# Patient Record
Sex: MALE | Race: WHITE | HISPANIC OR LATINO
[De-identification: names, ages, dates, MRNs, and addresses within clinical notes are randomized per-mention and may not be internally consistent; named-entity substitution may affect disease eponyms.]

---

## 2017-02-10 ENCOUNTER — RX RENEWAL (OUTPATIENT)
Age: 77
End: 2017-02-10

## 2017-02-14 ENCOUNTER — APPOINTMENT (OUTPATIENT)
Dept: FAMILY MEDICINE | Facility: CLINIC | Age: 77
End: 2017-02-14

## 2017-02-14 VITALS
WEIGHT: 185 LBS | DIASTOLIC BLOOD PRESSURE: 60 MMHG | HEIGHT: 63 IN | OXYGEN SATURATION: 99 % | SYSTOLIC BLOOD PRESSURE: 110 MMHG | HEART RATE: 92 BPM | BODY MASS INDEX: 32.78 KG/M2 | RESPIRATION RATE: 14 BRPM

## 2017-02-15 ENCOUNTER — MEDICATION RENEWAL (OUTPATIENT)
Age: 77
End: 2017-02-15

## 2017-02-15 LAB
APPEARANCE: CLEAR
BACTERIA: NEGATIVE
BASOPHILS # BLD AUTO: 0.04 K/UL
BASOPHILS NFR BLD AUTO: 0.6 %
BILIRUBIN URINE: NEGATIVE
BLOOD URINE: NEGATIVE
COLOR: YELLOW
EOSINOPHIL # BLD AUTO: 0.24 K/UL
EOSINOPHIL NFR BLD AUTO: 3.8 %
GLUCOSE QUALITATIVE U: NORMAL MG/DL
HCT VFR BLD CALC: 47 %
HGB BLD-MCNC: 15.7 G/DL
IMM GRANULOCYTES NFR BLD AUTO: 0.3 %
KETONES URINE: NEGATIVE
LEUKOCYTE ESTERASE URINE: NEGATIVE
LYMPHOCYTES # BLD AUTO: 2.37 K/UL
LYMPHOCYTES NFR BLD AUTO: 37 %
MAN DIFF?: NORMAL
MCHC RBC-ENTMCNC: 30.6 PG
MCHC RBC-ENTMCNC: 33.4 GM/DL
MCV RBC AUTO: 91.6 FL
MICROSCOPIC-UA: NORMAL
MONOCYTES # BLD AUTO: 0.46 K/UL
MONOCYTES NFR BLD AUTO: 7.2 %
NEUTROPHILS # BLD AUTO: 3.27 K/UL
NEUTROPHILS NFR BLD AUTO: 51.1 %
NITRITE URINE: NEGATIVE
PH URINE: 5.5
PLATELET # BLD AUTO: 200 K/UL
PROTEIN URINE: NEGATIVE MG/DL
RBC # BLD: 5.13 M/UL
RBC # FLD: 13.7 %
RED BLOOD CELLS URINE: 4 /HPF
SPECIFIC GRAVITY URINE: 1.02
SQUAMOUS EPITHELIAL CELLS: 0 /HPF
UROBILINOGEN URINE: NORMAL MG/DL
WBC # FLD AUTO: 6.4 K/UL
WHITE BLOOD CELLS URINE: 0 /HPF

## 2017-02-16 LAB
ALBUMIN SERPL ELPH-MCNC: 4.3 G/DL
ALP BLD-CCNC: 63 U/L
ALT SERPL-CCNC: 24 U/L
ANION GAP SERPL CALC-SCNC: 16 MMOL/L
AST SERPL-CCNC: 23 U/L
BILIRUB SERPL-MCNC: 0.7 MG/DL
BUN SERPL-MCNC: 21 MG/DL
CALCIUM SERPL-MCNC: 9.8 MG/DL
CHLORIDE SERPL-SCNC: 105 MMOL/L
CHOLEST SERPL-MCNC: 226 MG/DL
CHOLEST/HDLC SERPL: 4.2 RATIO
CO2 SERPL-SCNC: 20 MMOL/L
CREAT SERPL-MCNC: 1.12 MG/DL
GLUCOSE SERPL-MCNC: 107 MG/DL
HDLC SERPL-MCNC: 54 MG/DL
LDLC SERPL CALC-MCNC: 138 MG/DL
POTASSIUM SERPL-SCNC: 4.1 MMOL/L
PROT SERPL-MCNC: 7.4 G/DL
SODIUM SERPL-SCNC: 141 MMOL/L
TRIGL SERPL-MCNC: 171 MG/DL

## 2017-02-28 ENCOUNTER — MEDICATION RENEWAL (OUTPATIENT)
Age: 77
End: 2017-02-28

## 2017-03-31 ENCOUNTER — RX RENEWAL (OUTPATIENT)
Age: 77
End: 2017-03-31

## 2017-04-26 ENCOUNTER — RX RENEWAL (OUTPATIENT)
Age: 77
End: 2017-04-26

## 2017-06-05 ENCOUNTER — RX RENEWAL (OUTPATIENT)
Age: 77
End: 2017-06-05

## 2017-06-14 ENCOUNTER — RX RENEWAL (OUTPATIENT)
Age: 77
End: 2017-06-14

## 2017-07-24 ENCOUNTER — RX RENEWAL (OUTPATIENT)
Age: 77
End: 2017-07-24

## 2017-08-15 ENCOUNTER — APPOINTMENT (OUTPATIENT)
Dept: FAMILY MEDICINE | Facility: CLINIC | Age: 77
End: 2017-08-15
Payer: MEDICARE

## 2017-08-15 VITALS
SYSTOLIC BLOOD PRESSURE: 117 MMHG | BODY MASS INDEX: 32.96 KG/M2 | HEART RATE: 81 BPM | RESPIRATION RATE: 12 BRPM | WEIGHT: 186 LBS | DIASTOLIC BLOOD PRESSURE: 77 MMHG | HEIGHT: 63 IN

## 2017-08-15 PROCEDURE — 99213 OFFICE O/P EST LOW 20 MIN: CPT

## 2017-09-01 LAB
ALBUMIN SERPL ELPH-MCNC: 4.3 G/DL
ALP BLD-CCNC: 64 U/L
ALT SERPL-CCNC: 16 U/L
ANION GAP SERPL CALC-SCNC: 16 MMOL/L
APPEARANCE: CLEAR
AST SERPL-CCNC: 19 U/L
BACTERIA: NEGATIVE
BASOPHILS # BLD AUTO: 0.03 K/UL
BASOPHILS NFR BLD AUTO: 0.4 %
BILIRUB SERPL-MCNC: 0.8 MG/DL
BILIRUBIN URINE: NEGATIVE
BLOOD URINE: NEGATIVE
BUN SERPL-MCNC: 13 MG/DL
CALCIUM SERPL-MCNC: 9.6 MG/DL
CHLORIDE SERPL-SCNC: 107 MMOL/L
CHOLEST SERPL-MCNC: 144 MG/DL
CHOLEST/HDLC SERPL: 2.7 RATIO
CO2 SERPL-SCNC: 21 MMOL/L
COLOR: YELLOW
CREAT SERPL-MCNC: 1.1 MG/DL
EOSINOPHIL # BLD AUTO: 0.18 K/UL
EOSINOPHIL NFR BLD AUTO: 2.5 %
GLUCOSE QUALITATIVE U: NORMAL MG/DL
GLUCOSE SERPL-MCNC: 113 MG/DL
HBA1C MFR BLD HPLC: 5.2 %
HCT VFR BLD CALC: 43 %
HDLC SERPL-MCNC: 54 MG/DL
HGB BLD-MCNC: 14.4 G/DL
HYALINE CASTS: 0 /LPF
IMM GRANULOCYTES NFR BLD AUTO: 0.3 %
KETONES URINE: NEGATIVE
LDLC SERPL CALC-MCNC: 60 MG/DL
LEUKOCYTE ESTERASE URINE: NEGATIVE
LYMPHOCYTES # BLD AUTO: 1.91 K/UL
LYMPHOCYTES NFR BLD AUTO: 27 %
MAN DIFF?: NORMAL
MCHC RBC-ENTMCNC: 30.3 PG
MCHC RBC-ENTMCNC: 33.5 GM/DL
MCV RBC AUTO: 90.5 FL
MICROSCOPIC-UA: NORMAL
MONOCYTES # BLD AUTO: 0.45 K/UL
MONOCYTES NFR BLD AUTO: 6.4 %
NEUTROPHILS # BLD AUTO: 4.48 K/UL
NEUTROPHILS NFR BLD AUTO: 63.4 %
NITRITE URINE: NEGATIVE
PH URINE: 5.5
PLATELET # BLD AUTO: 187 K/UL
POTASSIUM SERPL-SCNC: 4.6 MMOL/L
PROT SERPL-MCNC: 7.1 G/DL
PROTEIN URINE: NEGATIVE MG/DL
RBC # BLD: 4.75 M/UL
RBC # FLD: 13.5 %
RED BLOOD CELLS URINE: 2 /HPF
SODIUM SERPL-SCNC: 144 MMOL/L
SPECIFIC GRAVITY URINE: 1.01
SQUAMOUS EPITHELIAL CELLS: 2 /HPF
TRIGL SERPL-MCNC: 151 MG/DL
URATE SERPL-MCNC: 7.6 MG/DL
UROBILINOGEN URINE: NORMAL MG/DL
WBC # FLD AUTO: 7.07 K/UL
WHITE BLOOD CELLS URINE: 0 /HPF

## 2017-09-05 ENCOUNTER — RX RENEWAL (OUTPATIENT)
Age: 77
End: 2017-09-05

## 2017-09-06 ENCOUNTER — RX RENEWAL (OUTPATIENT)
Age: 77
End: 2017-09-06

## 2017-09-08 ENCOUNTER — TRANSCRIPTION ENCOUNTER (OUTPATIENT)
Age: 77
End: 2017-09-08

## 2017-09-11 ENCOUNTER — RX RENEWAL (OUTPATIENT)
Age: 77
End: 2017-09-11

## 2017-10-10 ENCOUNTER — APPOINTMENT (OUTPATIENT)
Dept: FAMILY MEDICINE | Facility: CLINIC | Age: 77
End: 2017-10-10
Payer: MEDICARE

## 2017-10-10 VITALS
OXYGEN SATURATION: 99 % | SYSTOLIC BLOOD PRESSURE: 115 MMHG | RESPIRATION RATE: 12 BRPM | DIASTOLIC BLOOD PRESSURE: 60 MMHG | BODY MASS INDEX: 32.25 KG/M2 | WEIGHT: 182 LBS | HEIGHT: 63 IN | HEART RATE: 80 BPM

## 2017-10-10 DIAGNOSIS — G31.84 MILD COGNITIVE IMPAIRMENT, SO STATED: ICD-10-CM

## 2017-10-10 DIAGNOSIS — G47.00 INSOMNIA, UNSPECIFIED: ICD-10-CM

## 2017-10-10 PROCEDURE — 99213 OFFICE O/P EST LOW 20 MIN: CPT

## 2017-10-13 ENCOUNTER — MEDICATION RENEWAL (OUTPATIENT)
Age: 77
End: 2017-10-13

## 2017-10-17 ENCOUNTER — RX RENEWAL (OUTPATIENT)
Age: 77
End: 2017-10-17

## 2017-10-29 ENCOUNTER — RX RENEWAL (OUTPATIENT)
Age: 77
End: 2017-10-29

## 2017-11-08 ENCOUNTER — APPOINTMENT (OUTPATIENT)
Dept: PHYSICAL MEDICINE AND REHAB | Facility: CLINIC | Age: 77
End: 2017-11-08
Payer: MEDICARE

## 2017-11-08 VITALS
SYSTOLIC BLOOD PRESSURE: 130 MMHG | DIASTOLIC BLOOD PRESSURE: 77 MMHG | RESPIRATION RATE: 16 BRPM | HEIGHT: 63 IN | HEART RATE: 105 BPM | WEIGHT: 187 LBS | BODY MASS INDEX: 33.13 KG/M2 | OXYGEN SATURATION: 97 %

## 2017-11-08 PROCEDURE — 99213 OFFICE O/P EST LOW 20 MIN: CPT

## 2017-11-09 ENCOUNTER — RX RENEWAL (OUTPATIENT)
Age: 77
End: 2017-11-09

## 2017-12-04 ENCOUNTER — RX RENEWAL (OUTPATIENT)
Age: 77
End: 2017-12-04

## 2017-12-21 ENCOUNTER — MEDICATION RENEWAL (OUTPATIENT)
Age: 77
End: 2017-12-21

## 2018-01-17 ENCOUNTER — RX RENEWAL (OUTPATIENT)
Age: 78
End: 2018-01-17

## 2018-02-01 ENCOUNTER — RX RENEWAL (OUTPATIENT)
Age: 78
End: 2018-02-01

## 2018-03-19 ENCOUNTER — RX RENEWAL (OUTPATIENT)
Age: 78
End: 2018-03-19

## 2018-04-03 ENCOUNTER — APPOINTMENT (OUTPATIENT)
Dept: FAMILY MEDICINE | Facility: CLINIC | Age: 78
End: 2018-04-03
Payer: MEDICARE

## 2018-04-03 VITALS
BODY MASS INDEX: 33.66 KG/M2 | RESPIRATION RATE: 16 BRPM | HEART RATE: 100 BPM | HEIGHT: 63 IN | TEMPERATURE: 208.58 F | WEIGHT: 190 LBS | OXYGEN SATURATION: 97 % | SYSTOLIC BLOOD PRESSURE: 140 MMHG | DIASTOLIC BLOOD PRESSURE: 72 MMHG

## 2018-04-03 DIAGNOSIS — Z00.00 ENCOUNTER FOR GENERAL ADULT MEDICAL EXAMINATION W/OUT ABNORMAL FINDINGS: ICD-10-CM

## 2018-04-03 PROCEDURE — 99213 OFFICE O/P EST LOW 20 MIN: CPT

## 2018-04-09 ENCOUNTER — CLINICAL ADVICE (OUTPATIENT)
Age: 78
End: 2018-04-09

## 2018-04-09 LAB
ALBUMIN SERPL ELPH-MCNC: 4.5 G/DL
ALP BLD-CCNC: 58 U/L
ALT SERPL-CCNC: 24 U/L
ANION GAP SERPL CALC-SCNC: 13 MMOL/L
APPEARANCE: CLEAR
AST SERPL-CCNC: 22 U/L
BACTERIA: NEGATIVE
BASOPHILS # BLD AUTO: 0.02 K/UL
BASOPHILS NFR BLD AUTO: 0.3 %
BILIRUB SERPL-MCNC: 1 MG/DL
BILIRUBIN URINE: NEGATIVE
BLOOD URINE: NEGATIVE
BUN SERPL-MCNC: 13 MG/DL
CALCIUM SERPL-MCNC: 9.8 MG/DL
CHLORIDE SERPL-SCNC: 106 MMOL/L
CHOLEST SERPL-MCNC: 178 MG/DL
CHOLEST/HDLC SERPL: 3.2 RATIO
CO2 SERPL-SCNC: 22 MMOL/L
COLOR: YELLOW
CREAT SERPL-MCNC: 1.07 MG/DL
EOSINOPHIL # BLD AUTO: 0.13 K/UL
EOSINOPHIL NFR BLD AUTO: 1.8 %
GLUCOSE QUALITATIVE U: NEGATIVE MG/DL
GLUCOSE SERPL-MCNC: 121 MG/DL
HBA1C MFR BLD HPLC: 5.4 %
HCT VFR BLD CALC: 47 %
HDLC SERPL-MCNC: 56 MG/DL
HGB BLD-MCNC: 15.8 G/DL
HYALINE CASTS: 0 /LPF
IMM GRANULOCYTES NFR BLD AUTO: 0.3 %
KETONES URINE: NEGATIVE
LDLC SERPL CALC-MCNC: 87 MG/DL
LEUKOCYTE ESTERASE URINE: NEGATIVE
LYMPHOCYTES # BLD AUTO: 2.19 K/UL
LYMPHOCYTES NFR BLD AUTO: 30.9 %
MAN DIFF?: NORMAL
MCHC RBC-ENTMCNC: 30.6 PG
MCHC RBC-ENTMCNC: 33.6 GM/DL
MCV RBC AUTO: 91.1 FL
MICROSCOPIC-UA: NORMAL
MONOCYTES # BLD AUTO: 0.49 K/UL
MONOCYTES NFR BLD AUTO: 6.9 %
NEUTROPHILS # BLD AUTO: 4.23 K/UL
NEUTROPHILS NFR BLD AUTO: 59.8 %
NITRITE URINE: NEGATIVE
PH URINE: 5.5
PLATELET # BLD AUTO: 186 K/UL
POTASSIUM SERPL-SCNC: 4.6 MMOL/L
PROT SERPL-MCNC: 7.5 G/DL
PROTEIN URINE: NEGATIVE MG/DL
RBC # BLD: 5.16 M/UL
RBC # FLD: 13.2 %
RED BLOOD CELLS URINE: 4 /HPF
SODIUM SERPL-SCNC: 141 MMOL/L
SPECIFIC GRAVITY URINE: 1.02
SQUAMOUS EPITHELIAL CELLS: 0 /HPF
TRIGL SERPL-MCNC: 175 MG/DL
TSH SERPL-ACNC: 0.84 UIU/ML
UROBILINOGEN URINE: NEGATIVE MG/DL
VIT B12 SERPL-MCNC: 1065 PG/ML
WBC # FLD AUTO: 7.08 K/UL
WHITE BLOOD CELLS URINE: 0 /HPF

## 2018-04-11 ENCOUNTER — APPOINTMENT (OUTPATIENT)
Dept: PHYSICAL MEDICINE AND REHAB | Facility: CLINIC | Age: 78
End: 2018-04-11
Payer: MEDICARE

## 2018-04-11 VITALS
OXYGEN SATURATION: 96 % | HEART RATE: 93 BPM | RESPIRATION RATE: 16 BRPM | HEIGHT: 63 IN | DIASTOLIC BLOOD PRESSURE: 74 MMHG | SYSTOLIC BLOOD PRESSURE: 133 MMHG | WEIGHT: 190 LBS | BODY MASS INDEX: 33.66 KG/M2

## 2018-04-11 PROCEDURE — 99213 OFFICE O/P EST LOW 20 MIN: CPT

## 2018-04-23 ENCOUNTER — RX RENEWAL (OUTPATIENT)
Age: 78
End: 2018-04-23

## 2018-04-26 ENCOUNTER — RX RENEWAL (OUTPATIENT)
Age: 78
End: 2018-04-26

## 2018-05-04 ENCOUNTER — MEDICATION RENEWAL (OUTPATIENT)
Age: 78
End: 2018-05-04

## 2018-05-08 ENCOUNTER — RX RENEWAL (OUTPATIENT)
Age: 78
End: 2018-05-08

## 2018-06-01 ENCOUNTER — MEDICATION RENEWAL (OUTPATIENT)
Age: 78
End: 2018-06-01

## 2018-07-09 ENCOUNTER — RX RENEWAL (OUTPATIENT)
Age: 78
End: 2018-07-09

## 2018-08-27 ENCOUNTER — RX RENEWAL (OUTPATIENT)
Age: 78
End: 2018-08-27

## 2018-09-01 ENCOUNTER — RX RENEWAL (OUTPATIENT)
Age: 78
End: 2018-09-01

## 2018-09-11 ENCOUNTER — APPOINTMENT (OUTPATIENT)
Dept: FAMILY MEDICINE | Facility: CLINIC | Age: 78
End: 2018-09-11
Payer: MEDICARE

## 2018-09-11 VITALS — DIASTOLIC BLOOD PRESSURE: 78 MMHG | HEART RATE: 80 BPM | SYSTOLIC BLOOD PRESSURE: 148 MMHG | RESPIRATION RATE: 14 BRPM

## 2018-09-11 PROCEDURE — 99213 OFFICE O/P EST LOW 20 MIN: CPT

## 2018-09-11 NOTE — ASSESSMENT
[FreeTextEntry1] : Patient seems to be functioning quite well he'll be sent for routine laboratory work isn't instructed to return for a flu shot in the fall although in the past that he and his wife have both refused preventative measures

## 2018-09-11 NOTE — HISTORY OF PRESENT ILLNESS
[FreeTextEntry1] : hypertension dementia [de-identified] : Patient is seen in routine followup he offers no complaints he has been following with Dr. Kaufman with regard to dementia and is on Namenda and Aricept is also on multiple blood pressure medications review of systems is unremarkable his appetite is good his weight is stable he has no trouble with bowel or bladder function he seems alert and oriented today

## 2018-09-11 NOTE — PHYSICAL EXAM
[No Acute Distress] : no acute distress [Well Nourished] : well nourished [Well Developed] : well developed [Well-Appearing] : well-appearing [Normal Sclera/Conjunctiva] : normal sclera/conjunctiva [PERRL] : pupils equal round and reactive to light [Normal Outer Ear/Nose] : the outer ears and nose were normal in appearance [Normal Oropharynx] : the oropharynx was normal [No JVD] : no jugular venous distention [Supple] : supple [No Lymphadenopathy] : no lymphadenopathy [Thyroid Normal, No Nodules] : the thyroid was normal and there were no nodules present [No Respiratory Distress] : no respiratory distress  [Clear to Auscultation] : lungs were clear to auscultation bilaterally [Normal Rate] : normal rate  [Regular Rhythm] : with a regular rhythm [Normal S1, S2] : normal S1 and S2 [No Murmur] : no murmur heard [No Edema] : there was no peripheral edema [Soft] : abdomen soft [Non Tender] : non-tender [No Masses] : no abdominal mass palpated [No HSM] : no HSM [Normal Bowel Sounds] : normal bowel sounds [Normal Supraclavicular Nodes] : no supraclavicular lymphadenopathy [Normal Axillary Nodes] : no axillary lymphadenopathy [Normal Posterior Cervical Nodes] : no posterior cervical lymphadenopathy [Normal Anterior Cervical Nodes] : no anterior cervical lymphadenopathy [No CVA Tenderness] : no CVA  tenderness [No Spinal Tenderness] : no spinal tenderness [No Joint Swelling] : no joint swelling [Grossly Normal Strength/Tone] : grossly normal strength/tone [Normal Gait] : normal gait [Coordination Grossly Intact] : coordination grossly intact [Speech Grossly Normal] : speech grossly normal [Normal Affect] : the affect was normal [Normal Mood] : the mood was normal

## 2018-09-12 ENCOUNTER — APPOINTMENT (OUTPATIENT)
Dept: PHYSICAL MEDICINE AND REHAB | Facility: CLINIC | Age: 78
End: 2018-09-12
Payer: MEDICARE

## 2018-09-12 VITALS
RESPIRATION RATE: 14 BRPM | SYSTOLIC BLOOD PRESSURE: 140 MMHG | HEIGHT: 63 IN | HEART RATE: 109 BPM | WEIGHT: 190 LBS | OXYGEN SATURATION: 97 % | DIASTOLIC BLOOD PRESSURE: 70 MMHG | BODY MASS INDEX: 33.66 KG/M2

## 2018-09-12 PROCEDURE — 99213 OFFICE O/P EST LOW 20 MIN: CPT

## 2018-09-13 ENCOUNTER — RX RENEWAL (OUTPATIENT)
Age: 78
End: 2018-09-13

## 2018-09-16 ENCOUNTER — MEDICATION RENEWAL (OUTPATIENT)
Age: 78
End: 2018-09-16

## 2018-09-21 ENCOUNTER — RX RENEWAL (OUTPATIENT)
Age: 78
End: 2018-09-21

## 2018-10-31 LAB
ALBUMIN SERPL ELPH-MCNC: 4.4 G/DL
ALP BLD-CCNC: 49 U/L
ALT SERPL-CCNC: 22 U/L
ANION GAP SERPL CALC-SCNC: 16 MMOL/L
AST SERPL-CCNC: 18 U/L
BILIRUB SERPL-MCNC: 0.7 MG/DL
BUN SERPL-MCNC: 14 MG/DL
CALCIUM SERPL-MCNC: 9.8 MG/DL
CHLORIDE SERPL-SCNC: 105 MMOL/L
CO2 SERPL-SCNC: 20 MMOL/L
CREAT SERPL-MCNC: 1.06 MG/DL
HBA1C MFR BLD HPLC: 5.5 %
POTASSIUM SERPL-SCNC: 4.5 MMOL/L
PROT SERPL-MCNC: 6.8 G/DL
SODIUM SERPL-SCNC: 141 MMOL/L

## 2018-11-08 ENCOUNTER — RX RENEWAL (OUTPATIENT)
Age: 78
End: 2018-11-08

## 2018-12-27 ENCOUNTER — RX RENEWAL (OUTPATIENT)
Age: 78
End: 2018-12-27

## 2019-03-18 ENCOUNTER — RX RENEWAL (OUTPATIENT)
Age: 79
End: 2019-03-18

## 2019-03-19 ENCOUNTER — APPOINTMENT (OUTPATIENT)
Dept: FAMILY MEDICINE | Facility: CLINIC | Age: 79
End: 2019-03-19
Payer: MEDICARE

## 2019-03-19 ENCOUNTER — NON-APPOINTMENT (OUTPATIENT)
Age: 79
End: 2019-03-19

## 2019-03-19 VITALS
WEIGHT: 201 LBS | OXYGEN SATURATION: 97 % | BODY MASS INDEX: 35.61 KG/M2 | HEIGHT: 63 IN | RESPIRATION RATE: 16 BRPM | SYSTOLIC BLOOD PRESSURE: 120 MMHG | HEART RATE: 119 BPM | DIASTOLIC BLOOD PRESSURE: 66 MMHG

## 2019-03-19 DIAGNOSIS — E78.5 HYPERLIPIDEMIA, UNSPECIFIED: ICD-10-CM

## 2019-03-19 DIAGNOSIS — I10 ESSENTIAL (PRIMARY) HYPERTENSION: ICD-10-CM

## 2019-03-19 DIAGNOSIS — F03.90 UNSPECIFIED DEMENTIA W/OUT BEHAVIORAL DISTURBANCE: ICD-10-CM

## 2019-03-19 PROCEDURE — G0439: CPT | Mod: 25

## 2019-03-19 PROCEDURE — 93000 ELECTROCARDIOGRAM COMPLETE: CPT

## 2019-03-19 RX ORDER — TRAZODONE HYDROCHLORIDE 50 MG/1
50 TABLET ORAL
Qty: 90 | Refills: 0 | Status: DISCONTINUED | COMMUNITY
Start: 2017-10-10 | End: 2019-03-19

## 2019-03-19 RX ORDER — ZOLPIDEM TARTRATE 5 MG/1
5 TABLET ORAL
Qty: 30 | Refills: 1 | Status: ACTIVE | COMMUNITY
Start: 2017-02-28 | End: 1900-01-01

## 2019-03-19 RX ORDER — LATANOPROST/PF 0.005 %
0.01 DROPS OPHTHALMIC (EYE)
Qty: 75 | Refills: 0 | Status: ACTIVE | COMMUNITY
Start: 2018-10-30

## 2019-03-19 RX ORDER — ALPRAZOLAM 0.5 MG/1
0.5 TABLET ORAL
Qty: 30 | Refills: 0 | Status: COMPLETED | COMMUNITY
Start: 2018-12-22

## 2019-03-19 RX ORDER — BRIMONIDINE TARTRATE 2 MG/MG
0.2 SOLUTION/ DROPS OPHTHALMIC
Qty: 30 | Refills: 0 | Status: COMPLETED | COMMUNITY
Start: 2018-11-26

## 2019-03-19 RX ORDER — ALPRAZOLAM 0.25 MG/1
0.25 TABLET ORAL
Qty: 30 | Refills: 0 | Status: ACTIVE | COMMUNITY
Start: 2019-03-19 | End: 1900-01-01

## 2019-03-19 RX ORDER — ALFUZOSIN HYDROCHLORIDE 10 MG/1
10 TABLET, EXTENDED RELEASE ORAL
Qty: 90 | Refills: 0 | Status: COMPLETED | COMMUNITY
Start: 2018-09-13

## 2019-03-19 RX ORDER — TRAZODONE HYDROCHLORIDE 100 MG/1
100 TABLET ORAL
Qty: 90 | Refills: 0 | Status: ACTIVE | COMMUNITY
Start: 2018-11-17

## 2019-03-19 RX ORDER — DIVALPROEX SODIUM 125 MG/1
125 TABLET, DELAYED RELEASE ORAL
Qty: 90 | Refills: 0 | Status: ACTIVE | COMMUNITY
Start: 2019-01-26

## 2019-03-19 NOTE — PHYSICAL EXAM
[No Acute Distress] : no acute distress [Well Nourished] : well nourished [Well Developed] : well developed [Normal Sclera/Conjunctiva] : normal sclera/conjunctiva [Well-Appearing] : well-appearing [Normal Oropharynx] : the oropharynx was normal [Normal Outer Ear/Nose] : the outer ears and nose were normal in appearance [Supple] : supple [No JVD] : no jugular venous distention [Thyroid Normal, No Nodules] : the thyroid was normal and there were no nodules present [No Lymphadenopathy] : no lymphadenopathy [Clear to Auscultation] : lungs were clear to auscultation bilaterally [No Respiratory Distress] : no respiratory distress  [Normal Rate] : normal rate  [Regular Rhythm] : with a regular rhythm [No Edema] : there was no peripheral edema [No Murmur] : no murmur heard [Soft] : abdomen soft [Non Tender] : non-tender [No Masses] : no abdominal mass palpated [No HSM] : no HSM [Normal Supraclavicular Nodes] : no supraclavicular lymphadenopathy [Normal Bowel Sounds] : normal bowel sounds [Normal Anterior Cervical Nodes] : no anterior cervical lymphadenopathy [Normal Posterior Cervical Nodes] : no posterior cervical lymphadenopathy [No Spinal Tenderness] : no spinal tenderness [No CVA Tenderness] : no CVA  tenderness [Normal Gait] : normal gait [No Joint Swelling] : no joint swelling [Coordination Grossly Intact] : coordination grossly intact [No Focal Deficits] : no focal deficits [de-identified] : obese

## 2019-03-19 NOTE — ASSESSMENT
[FreeTextEntry1] : Patient this seems to be stable he has gained some weight he will be advised to cut down on his food intake routine laboratory will be performed as well as an EKG he refuses  pneumonia immunizationas he has in the past.Review of chart reveals that the patient had an abnormal colonoscopy 4 years ago and according to his wife he was told that everything was okay and he did not have to return in reading a notation from Dr. Patel apparently there were some polyps in any event the patient will be sent for a followup colonoscopy and lab work

## 2019-03-19 NOTE — HISTORY OF PRESENT ILLNESS
[FreeTextEntry1] : Dementia, gout, hypertension [de-identified] : Patient is seen today in followup on his dementia gout and hypertension he has been feeling reasonably well last gout attack being many months ago he did have a transitory pain in his left knee which showed improved on Indocin which may have been gout.-wise his main issues are dementia he is taking Aricept for that he has been following with Dr. Kaufman of neurology . Review of systems is unremarkable he has gained some weight

## 2019-03-19 NOTE — REVIEW OF SYSTEMS
[Joint Pain] : joint pain [Joint Stiffness] : joint stiffness [Fever] : no fever [Chills] : no chills [Night Sweats] : no night sweats [Pain] : no pain [Earache] : no earache [Nasal Discharge] : no nasal discharge [Sore Throat] : no sore throat [Chest Pain] : no chest pain [Palpitations] : no palpitations [Orthopena] : no orthopnea [Shortness Of Breath] : no shortness of breath [Paroysmal Nocturnal Dyspnea] : no paroysmal nocturnal dyspnea [Nausea] : no nausea [Diarrhea] : no diarrhea [Vomiting] : no vomiting [Heartburn] : no heartburn [Dysuria] : no dysuria [Incontinence] : no incontinence [Hematuria] : no hematuria [Frequency] : no frequency [Back Pain] : no back pain [Dizziness] : no dizziness [Headache] : no headache [Unsteady Walk] : no ataxia [Memory Loss] : no memory loss [Easy Bleeding] : no easy bleeding

## 2019-04-03 LAB
ALBUMIN SERPL ELPH-MCNC: 4.6 G/DL
ALP BLD-CCNC: 55 U/L
ALT SERPL-CCNC: 25 U/L
ANION GAP SERPL CALC-SCNC: 16 MMOL/L
AST SERPL-CCNC: 20 U/L
BASOPHILS # BLD AUTO: 0.04 K/UL
BASOPHILS NFR BLD AUTO: 0.6 %
BILIRUB SERPL-MCNC: 0.6 MG/DL
BUN SERPL-MCNC: 13 MG/DL
CALCIUM SERPL-MCNC: 10.1 MG/DL
CHLORIDE SERPL-SCNC: 104 MMOL/L
CHOLEST SERPL-MCNC: 202 MG/DL
CHOLEST/HDLC SERPL: 3.6 RATIO
CO2 SERPL-SCNC: 20 MMOL/L
CREAT SERPL-MCNC: 1.02 MG/DL
EOSINOPHIL # BLD AUTO: 0.13 K/UL
EOSINOPHIL NFR BLD AUTO: 1.8 %
GLUCOSE SERPL-MCNC: 126 MG/DL
HBA1C MFR BLD HPLC: 5.7 %
HCT VFR BLD CALC: 46.6 %
HDLC SERPL-MCNC: 56 MG/DL
HGB BLD-MCNC: 15.2 G/DL
IMM GRANULOCYTES NFR BLD AUTO: 0.6 %
LDLC SERPL CALC-MCNC: 109 MG/DL
LYMPHOCYTES # BLD AUTO: 2.08 K/UL
LYMPHOCYTES NFR BLD AUTO: 29.3 %
MAN DIFF?: NORMAL
MCHC RBC-ENTMCNC: 29.7 PG
MCHC RBC-ENTMCNC: 32.6 GM/DL
MCV RBC AUTO: 91.2 FL
MONOCYTES # BLD AUTO: 0.44 K/UL
MONOCYTES NFR BLD AUTO: 6.2 %
NEUTROPHILS # BLD AUTO: 4.37 K/UL
NEUTROPHILS NFR BLD AUTO: 61.5 %
PLATELET # BLD AUTO: 207 K/UL
POTASSIUM SERPL-SCNC: 4.5 MMOL/L
PROT SERPL-MCNC: 7.3 G/DL
RBC # BLD: 5.11 M/UL
RBC # FLD: 12.9 %
SODIUM SERPL-SCNC: 140 MMOL/L
TRIGL SERPL-MCNC: 187 MG/DL
TSH SERPL-ACNC: 0.78 UIU/ML
URATE SERPL-MCNC: 7.4 MG/DL
WBC # FLD AUTO: 7.1 K/UL

## 2019-04-05 LAB
APPEARANCE: CLEAR
BILIRUBIN URINE: NEGATIVE
BLOOD URINE: NEGATIVE
COLOR: YELLOW
GLUCOSE QUALITATIVE U: NEGATIVE
KETONES URINE: NEGATIVE
LEUKOCYTE ESTERASE URINE: NEGATIVE
NITRITE URINE: NEGATIVE
PH URINE: 5.5
PROTEIN URINE: NORMAL
SPECIFIC GRAVITY URINE: 1.02
UROBILINOGEN URINE: NORMAL

## 2019-04-24 ENCOUNTER — APPOINTMENT (OUTPATIENT)
Dept: GASTROENTEROLOGY | Facility: CLINIC | Age: 79
End: 2019-04-24

## 2019-06-12 ENCOUNTER — RX RENEWAL (OUTPATIENT)
Age: 79
End: 2019-06-12

## 2019-07-02 ENCOUNTER — APPOINTMENT (OUTPATIENT)
Dept: FAMILY MEDICINE | Facility: CLINIC | Age: 79
End: 2019-07-02

## 2019-07-16 ENCOUNTER — EMERGENCY (EMERGENCY)
Facility: HOSPITAL | Age: 79
LOS: 1 days | Discharge: HOME | End: 2019-07-16
Attending: INTERNAL MEDICINE | Admitting: INTERNAL MEDICINE
Payer: MEDICARE

## 2019-07-16 VITALS
TEMPERATURE: 99 F | DIASTOLIC BLOOD PRESSURE: 70 MMHG | HEART RATE: 124 BPM | OXYGEN SATURATION: 96 % | RESPIRATION RATE: 19 BRPM | SYSTOLIC BLOOD PRESSURE: 138 MMHG

## 2019-07-16 VITALS
SYSTOLIC BLOOD PRESSURE: 138 MMHG | HEART RATE: 107 BPM | RESPIRATION RATE: 19 BRPM | DIASTOLIC BLOOD PRESSURE: 78 MMHG | OXYGEN SATURATION: 98 %

## 2019-07-16 DIAGNOSIS — R41.0 DISORIENTATION, UNSPECIFIED: ICD-10-CM

## 2019-07-16 LAB
ALBUMIN SERPL ELPH-MCNC: 3.7 G/DL — SIGNIFICANT CHANGE UP (ref 3.3–5)
ALP SERPL-CCNC: 65 U/L — SIGNIFICANT CHANGE UP (ref 40–120)
ALT FLD-CCNC: 31 U/L DA — SIGNIFICANT CHANGE UP (ref 10–45)
ANION GAP SERPL CALC-SCNC: 16 MMOL/L — SIGNIFICANT CHANGE UP (ref 5–17)
APTT BLD: 29.6 SEC — SIGNIFICANT CHANGE UP (ref 27.5–36.3)
AST SERPL-CCNC: 19 U/L — SIGNIFICANT CHANGE UP (ref 10–40)
BASOPHILS # BLD AUTO: 0.04 K/UL — SIGNIFICANT CHANGE UP (ref 0–0.2)
BASOPHILS NFR BLD AUTO: 0.4 % — SIGNIFICANT CHANGE UP (ref 0–2)
BILIRUB SERPL-MCNC: 0.6 MG/DL — SIGNIFICANT CHANGE UP (ref 0.2–1.2)
BUN SERPL-MCNC: 25 MG/DL — HIGH (ref 7–23)
CALCIUM SERPL-MCNC: 9.5 MG/DL — SIGNIFICANT CHANGE UP (ref 8.4–10.5)
CHLORIDE SERPL-SCNC: 103 MMOL/L — SIGNIFICANT CHANGE UP (ref 96–108)
CK SERPL-CCNC: 202 U/L — HIGH (ref 30–200)
CO2 SERPL-SCNC: 21 MMOL/L — LOW (ref 22–31)
CREAT SERPL-MCNC: 1.58 MG/DL — HIGH (ref 0.5–1.3)
EOSINOPHIL # BLD AUTO: 0.13 K/UL — SIGNIFICANT CHANGE UP (ref 0–0.5)
EOSINOPHIL NFR BLD AUTO: 1.4 % — SIGNIFICANT CHANGE UP (ref 0–6)
GLUCOSE SERPL-MCNC: 147 MG/DL — HIGH (ref 70–99)
HCT VFR BLD CALC: 44.9 % — SIGNIFICANT CHANGE UP (ref 39–50)
HGB BLD-MCNC: 15 G/DL — SIGNIFICANT CHANGE UP (ref 13–17)
IMM GRANULOCYTES NFR BLD AUTO: 0.6 % — SIGNIFICANT CHANGE UP (ref 0–1.5)
INR BLD: 1.03 RATIO — SIGNIFICANT CHANGE UP (ref 0.88–1.16)
LACTATE SERPL-SCNC: 2.7 MMOL/L — HIGH (ref 0.7–2)
LYMPHOCYTES # BLD AUTO: 2.32 K/UL — SIGNIFICANT CHANGE UP (ref 1–3.3)
LYMPHOCYTES # BLD AUTO: 24.5 % — SIGNIFICANT CHANGE UP (ref 13–44)
MCHC RBC-ENTMCNC: 29.9 PG — SIGNIFICANT CHANGE UP (ref 27–34)
MCHC RBC-ENTMCNC: 33.4 GM/DL — SIGNIFICANT CHANGE UP (ref 32–36)
MCV RBC AUTO: 89.6 FL — SIGNIFICANT CHANGE UP (ref 80–100)
MONOCYTES # BLD AUTO: 0.56 K/UL — SIGNIFICANT CHANGE UP (ref 0–0.9)
MONOCYTES NFR BLD AUTO: 5.9 % — SIGNIFICANT CHANGE UP (ref 2–14)
NEUTROPHILS # BLD AUTO: 6.35 K/UL — SIGNIFICANT CHANGE UP (ref 1.8–7.4)
NEUTROPHILS NFR BLD AUTO: 67.2 % — SIGNIFICANT CHANGE UP (ref 43–77)
NRBC # BLD: 0 /100 WBCS — SIGNIFICANT CHANGE UP (ref 0–0)
PLATELET # BLD AUTO: 224 K/UL — SIGNIFICANT CHANGE UP (ref 150–400)
POTASSIUM SERPL-MCNC: 3.7 MMOL/L — SIGNIFICANT CHANGE UP (ref 3.5–5.3)
POTASSIUM SERPL-SCNC: 3.7 MMOL/L — SIGNIFICANT CHANGE UP (ref 3.5–5.3)
PROT SERPL-MCNC: 7.6 G/DL — SIGNIFICANT CHANGE UP (ref 6–8.3)
PROTHROM AB SERPL-ACNC: 11.5 SEC — SIGNIFICANT CHANGE UP (ref 10–12.9)
RBC # BLD: 5.01 M/UL — SIGNIFICANT CHANGE UP (ref 4.2–5.8)
RBC # FLD: 13.3 % — SIGNIFICANT CHANGE UP (ref 10.3–14.5)
SODIUM SERPL-SCNC: 140 MMOL/L — SIGNIFICANT CHANGE UP (ref 135–145)
TROPONIN I SERPL-MCNC: <.017 NG/ML — LOW (ref 0.02–0.06)
WBC # BLD: 9.46 K/UL — SIGNIFICANT CHANGE UP (ref 3.8–10.5)
WBC # FLD AUTO: 9.46 K/UL — SIGNIFICANT CHANGE UP (ref 3.8–10.5)

## 2019-07-16 PROCEDURE — 83605 ASSAY OF LACTIC ACID: CPT

## 2019-07-16 PROCEDURE — 80053 COMPREHEN METABOLIC PANEL: CPT

## 2019-07-16 PROCEDURE — 99284 EMERGENCY DEPT VISIT MOD MDM: CPT

## 2019-07-16 PROCEDURE — 36415 COLL VENOUS BLD VENIPUNCTURE: CPT

## 2019-07-16 PROCEDURE — 82550 ASSAY OF CK (CPK): CPT

## 2019-07-16 PROCEDURE — 85730 THROMBOPLASTIN TIME PARTIAL: CPT

## 2019-07-16 PROCEDURE — 85027 COMPLETE CBC AUTOMATED: CPT

## 2019-07-16 PROCEDURE — 87040 BLOOD CULTURE FOR BACTERIA: CPT

## 2019-07-16 PROCEDURE — 84484 ASSAY OF TROPONIN QUANT: CPT

## 2019-07-16 PROCEDURE — 99285 EMERGENCY DEPT VISIT HI MDM: CPT

## 2019-07-16 PROCEDURE — 85610 PROTHROMBIN TIME: CPT

## 2019-07-16 RX ORDER — SODIUM CHLORIDE 9 MG/ML
3 INJECTION INTRAMUSCULAR; INTRAVENOUS; SUBCUTANEOUS ONCE
Refills: 0 | Status: COMPLETED | OUTPATIENT
Start: 2019-07-16 | End: 2019-07-16

## 2019-07-16 RX ORDER — SODIUM CHLORIDE 9 MG/ML
1000 INJECTION INTRAMUSCULAR; INTRAVENOUS; SUBCUTANEOUS ONCE
Refills: 0 | Status: COMPLETED | OUTPATIENT
Start: 2019-07-16 | End: 2019-07-16

## 2019-07-16 NOTE — ED PROVIDER NOTE - ATTENDING CONTRIBUTION TO CARE
· HPI Objective Statement: pt is a 77yo male [employer] found on beach wandering. pt unsure why he is in ed. unknown pmhx. pt reports he lives in [place] with his parents who are 70/80 years old. pt reports abd pain previously but denies at this time. pt unsure of why he was at the beach. pt unsure who his family is.    used [id number]  pt work up stared pt was missing in beach family came and wanted pt to be signed out ama stated pt has hx of memory issues and gets lost often  Dr. Bryan:  I have reviewed and discussed with the PA/ resident the case specifics, including the history, physical assessment, evaluation, conclusion, laboratory results, and medical plan. I agree with the contents, and conclusions. I have personally examined, and interviewed the patient.

## 2019-07-16 NOTE — ED PROVIDER NOTE - CLINICAL SUMMARY MEDICAL DECISION MAKING FREE TEXT BOX
pt confused. will do labs per sepsis protocol, drug screen, ua to r/o uti with culture, cxr to r/o pna, ct head to r/o ich, ct abd/pelvis, ivf, ekg, re-eval

## 2019-07-16 NOTE — ED PROVIDER NOTE - PROGRESS NOTE DETAILS
PT WIFE PRESENTED TO ED AND ADVISED SHE WANTS TO TAKE HER  HOME PT WIFE PRESENTED TO ED AND ADVISED SHE WANTS TO TAKE HER  HOME. advised wife on need for evaluation wife refused advised she wants to take her  home . I had a lengthy discussion with patients wife, and the patients wife wishes to take her  home at this time.The patients wife understands that he/she is take patient home against medical advice despite the risk of missing a potential serious diagnosis which may lead to injury, disability and/or death. I discussed with the patients wife which tests would need to be performed and what type of monitoring would be necessary for the patient as well. I was unable to convince the patients wife to stay for further work-up.The patients wife is alert and oriented and demonstrates competence in making medical decisions. wife advised she can return at any time with patient or call 911. pt wife refused interpretation services.

## 2019-07-16 NOTE — ED PROVIDER NOTE - NSFOLLOWUPINSTRUCTIONS_ED_ALL_ED_FT
You are leaving against medical advice (AMA).  This may result in recurrent or worsening symptoms, severe permanent disability, pain and suffering, harm, injury, and/or even death.  The risks, benefits, and alternatives to treatment as well as the attendant risks of refusing treatment at this time have been discussed.  You have demonstrated comprehension and verbalized understanding of these risks.  If you change your mind, please return to the ER immediately.  Please return to the ER immediately for persistent or recurring symptoms, worsening symptoms, or any other problems or concerns.  Please contact the ER if you have any further questions or concerns.     FOLLOW UP WITH PRIMARY CARE DOCTOR

## 2019-07-16 NOTE — ED ADULT NURSE REASSESSMENT NOTE - NS ED NURSE REASSESS COMMENT FT1
Patients wife-hcp signed out patient AMA, wife was educated by Dr. Bryan regarding serious risks for discontinuing medical treatment for her  but still wants to sign out AMA. Both patient and wife wanted to leave AMA. Wife is aox3 and able to make decisions for her . Wife was yelling, angry and  noncompliant. She pulled out the I.V access and pulled off the heart monitor of patient. Patient and wife were encourage to go to the nearest emergency room for evaluation regarding any concerning issues or symptoms, Wife agreed. AMA is complete and scanned in chart.

## 2019-07-16 NOTE — ED ADULT NURSE NOTE - NSIMPLEMENTINTERV_GEN_ALL_ED
Implemented All Fall with Harm Risk Interventions:  [place] to call system. Call bell, personal items and telephone within reach. Instruct patient to call for assistance. Room bathroom lighting operational. Non-slip footwear when patient is off stretcher. Physically safe environment: no spills, clutter or unnecessary equipment. Stretcher in lowest position, wheels locked, appropriate side rails in place. Provide visual cue, wrist band, yellow gown, etc. Monitor gait and stability. Monitor for mental status changes and reorient to person, place, and time. Review medications for side effects contributing to fall risk. Reinforce activity limits and safety measures with patient and family. Provide visual clues: red socks.

## 2019-07-16 NOTE — ED ADULT NURSE NOTE - OBJECTIVE STATEMENT
Patient was brought in by EMS was found on beach, alone wandering around. Patient denies any shortness of breath, chest pain, fevers or weakness.

## 2019-07-16 NOTE — ED PROVIDER NOTE - OBJECTIVE STATEMENT
pt is a 79yo male [employer] found on beach wandering. pt unsure why he is in ed. unknown pmhx. pt reports he lives in [place] with his parents who are 70/80 years old. pt reports abd pain previously but denies at this time. pt unsure of why he was at the beach. pt unsure who his family is.    used [id number]

## 2019-07-17 NOTE — ED POST DISCHARGE NOTE - ADDITIONAL DOCUMENTATION
Pt BAILEY from hospital with family. Called to inform of result however, no answer and no machine. The other #'s, invalid.

## 2019-07-22 ENCOUNTER — FORM ENCOUNTER (OUTPATIENT)
Age: 79
End: 2019-07-22

## 2019-07-22 LAB
CULTURE RESULTS: SIGNIFICANT CHANGE UP
CULTURE RESULTS: SIGNIFICANT CHANGE UP
SPECIMEN SOURCE: SIGNIFICANT CHANGE UP
SPECIMEN SOURCE: SIGNIFICANT CHANGE UP

## 2019-07-23 ENCOUNTER — APPOINTMENT (OUTPATIENT)
Dept: FAMILY MEDICINE | Facility: CLINIC | Age: 79
End: 2019-07-23
Payer: MEDICARE

## 2019-07-23 ENCOUNTER — OUTPATIENT (OUTPATIENT)
Dept: OUTPATIENT SERVICES | Facility: HOSPITAL | Age: 79
LOS: 1 days | End: 2019-07-23
Payer: MEDICARE

## 2019-07-23 ENCOUNTER — APPOINTMENT (OUTPATIENT)
Dept: RADIOLOGY | Facility: HOSPITAL | Age: 79
End: 2019-07-23
Payer: MEDICARE

## 2019-07-23 VITALS
BODY MASS INDEX: 32.12 KG/M2 | WEIGHT: 181.3 LBS | OXYGEN SATURATION: 97 % | RESPIRATION RATE: 16 BRPM | HEART RATE: 109 BPM | HEIGHT: 63 IN | DIASTOLIC BLOOD PRESSURE: 59 MMHG | SYSTOLIC BLOOD PRESSURE: 100 MMHG

## 2019-07-23 DIAGNOSIS — R41.82 ALTERED MENTAL STATUS, UNSPECIFIED: ICD-10-CM

## 2019-07-23 DIAGNOSIS — M10.9 GOUT, UNSPECIFIED: ICD-10-CM

## 2019-07-23 PROCEDURE — 99214 OFFICE O/P EST MOD 30 MIN: CPT

## 2019-07-23 PROCEDURE — 73630 X-RAY EXAM OF FOOT: CPT

## 2019-07-23 PROCEDURE — 73630 X-RAY EXAM OF FOOT: CPT | Mod: 26,RT

## 2019-07-23 NOTE — HISTORY OF PRESENT ILLNESS
[de-identified] : Patient is here today brought in by his wife and a  in a wheelchair he is not able to walk for the past day they say approximately a week ago he was found wandering on the beach what the clinical range from where he had a partial evaluation but his wife signed him out AGAINST MEDICAL ADVICE he did not have any x-rays CAT scans or extensive laboratory work what was done did show an elevated creatinine of 1.58 up from a normal creatinine several months ago blood cultures were negative there was no elevated white count patient is unable to give a meaningful history he is alert and pleasant [FreeTextEntry1] : Mental status change swollen right foot

## 2019-07-23 NOTE — PHYSICAL EXAM
[No Acute Distress] : no acute distress [Well Developed] : well developed [Well Nourished] : well nourished [No JVD] : no jugular venous distention [Normal Oropharynx] : the oropharynx was normal [PERRL] : pupils equal round and reactive to light [Supple] : supple [No Lymphadenopathy] : no lymphadenopathy [No Respiratory Distress] : no respiratory distress  [Thyroid Normal, No Nodules] : the thyroid was normal and there were no nodules present [No Accessory Muscle Use] : no accessory muscle use [No Murmur] : no murmur heard [No Edema] : there was no peripheral edema [Normal Rate] : normal rate  [Regular Rhythm] : with a regular rhythm [Soft] : abdomen soft [Non Tender] : non-tender [Normal Bowel Sounds] : normal bowel sounds [No HSM] : no HSM [No Masses] : no abdominal mass palpated [Normal Anterior Cervical Nodes] : no anterior cervical lymphadenopathy [Normal Supraclavicular Nodes] : no supraclavicular lymphadenopathy [Normal Posterior Cervical Nodes] : no posterior cervical lymphadenopathy [No Spinal Tenderness] : no spinal tenderness [No CVA Tenderness] : no CVA  tenderness [Coordination Grossly Intact] : coordination grossly intact [No Focal Deficits] : no focal deficits [de-identified] : Dorsum of right foot is swollen red and warm not tender to the touch

## 2019-07-23 NOTE — ASSESSMENT
[FreeTextEntry1] : The patient has acute mental status change superimposed upon pre-existing dementia also swelling of the right foot which may represent gout attack but could be infection his wife took him out of the emergency room last week AGAINST MEDICAL ADVICE she also will not allow us to send him back to the emergency room for immediate workup he is in no severe distress however although the possibilities are multiple he will be sent for an x-ray of the right foot and more extensive laboratory work and they will also had a CAT scan of the head ordered although his insurance needs to improve his first unless she goes to the emergency room and the wife was unwilling to take him to the emergency room the foot may represent gout in which case and states are contraindicated due to his poor renal function we will consider moderate to high dose prednisone after lab work and x-ray are available to us

## 2019-07-23 NOTE — REVIEW OF SYSTEMS
[Chills] : no chills [Fever] : no fever [Night Sweats] : no night sweats [Chest Pain] : no chest pain [Vision Problems] : no vision problems [Palpitations] : no palpitations [Joint Pain] : joint pain [Confusion] : confusion [Frequency] : frequency [Unsteady Walk] : ataxia [Memory Loss] : memory loss [Negative] : Gastrointestinal

## 2019-07-24 LAB
ALBUMIN SERPL ELPH-MCNC: 4.2 G/DL
ALP BLD-CCNC: 62 U/L
ALT SERPL-CCNC: 11 U/L
ANION GAP SERPL CALC-SCNC: 14 MMOL/L
APPEARANCE: CLEAR
AST SERPL-CCNC: 9 U/L
BASOPHILS # BLD AUTO: 0.03 K/UL
BASOPHILS NFR BLD AUTO: 0.3 %
BILIRUB SERPL-MCNC: 1 MG/DL
BILIRUBIN URINE: NEGATIVE
BLOOD URINE: NEGATIVE
BUN SERPL-MCNC: 19 MG/DL
CALCIUM SERPL-MCNC: 10 MG/DL
CHLORIDE SERPL-SCNC: 104 MMOL/L
CO2 SERPL-SCNC: 22 MMOL/L
COLOR: YELLOW
CREAT SERPL-MCNC: 1.13 MG/DL
EOSINOPHIL # BLD AUTO: 0.08 K/UL
EOSINOPHIL NFR BLD AUTO: 0.9 %
ERYTHROCYTE [SEDIMENTATION RATE] IN BLOOD BY WESTERGREN METHOD: 60 MM/HR
ESTIMATED AVERAGE GLUCOSE: 123 MG/DL
GLUCOSE QUALITATIVE U: NEGATIVE
GLUCOSE SERPL-MCNC: 127 MG/DL
HBA1C MFR BLD HPLC: 5.9 %
HCT VFR BLD CALC: 44.8 %
HGB BLD-MCNC: 14.4 G/DL
IMM GRANULOCYTES NFR BLD AUTO: 0.2 %
KETONES URINE: NEGATIVE
LEUKOCYTE ESTERASE URINE: NEGATIVE
LYMPHOCYTES # BLD AUTO: 1.72 K/UL
LYMPHOCYTES NFR BLD AUTO: 18.4 %
MAN DIFF?: NORMAL
MCHC RBC-ENTMCNC: 28.8 PG
MCHC RBC-ENTMCNC: 32.1 GM/DL
MCV RBC AUTO: 89.6 FL
MONOCYTES # BLD AUTO: 0.79 K/UL
MONOCYTES NFR BLD AUTO: 8.4 %
NEUTROPHILS # BLD AUTO: 6.71 K/UL
NEUTROPHILS NFR BLD AUTO: 71.8 %
NITRITE URINE: NEGATIVE
PH URINE: 6
PLATELET # BLD AUTO: 207 K/UL
POTASSIUM SERPL-SCNC: 4.3 MMOL/L
PROT SERPL-MCNC: 7.1 G/DL
PROTEIN URINE: NORMAL
RBC # BLD: 5 M/UL
RBC # FLD: 13.3 %
SODIUM SERPL-SCNC: 140 MMOL/L
SPECIFIC GRAVITY URINE: 1.02
TSH SERPL-ACNC: 0.76 UIU/ML
URATE SERPL-MCNC: 7.7 MG/DL
UROBILINOGEN URINE: NORMAL
WBC # FLD AUTO: 9.35 K/UL

## 2019-07-24 RX ORDER — PREDNISONE 10 MG/1
10 TABLET ORAL
Qty: 30 | Refills: 0 | Status: ACTIVE | COMMUNITY
Start: 2019-07-24 | End: 1900-01-01

## 2019-07-25 ENCOUNTER — INPATIENT (INPATIENT)
Facility: HOSPITAL | Age: 79
LOS: 14 days | Discharge: NURSING, REHAB OR LONG-TERM CARE | DRG: 91 | End: 2019-08-09
Attending: STUDENT IN AN ORGANIZED HEALTH CARE EDUCATION/TRAINING PROGRAM | Admitting: HOSPITALIST
Payer: MEDICARE

## 2019-07-25 VITALS
WEIGHT: 220.02 LBS | HEIGHT: 71 IN | DIASTOLIC BLOOD PRESSURE: 83 MMHG | OXYGEN SATURATION: 95 % | RESPIRATION RATE: 20 BRPM | HEART RATE: 112 BPM | SYSTOLIC BLOOD PRESSURE: 128 MMHG | TEMPERATURE: 98 F

## 2019-07-25 DIAGNOSIS — G30.9 ALZHEIMER'S DISEASE, UNSPECIFIED: ICD-10-CM

## 2019-07-25 DIAGNOSIS — Z98.890 OTHER SPECIFIED POSTPROCEDURAL STATES: Chronic | ICD-10-CM

## 2019-07-25 DIAGNOSIS — M10.9 GOUT, UNSPECIFIED: ICD-10-CM

## 2019-07-25 DIAGNOSIS — M25.572 PAIN IN LEFT ANKLE AND JOINTS OF LEFT FOOT: ICD-10-CM

## 2019-07-25 DIAGNOSIS — R26.2 DIFFICULTY IN WALKING, NOT ELSEWHERE CLASSIFIED: ICD-10-CM

## 2019-07-25 DIAGNOSIS — Z29.9 ENCOUNTER FOR PROPHYLACTIC MEASURES, UNSPECIFIED: ICD-10-CM

## 2019-07-25 DIAGNOSIS — R00.0 TACHYCARDIA, UNSPECIFIED: ICD-10-CM

## 2019-07-25 DIAGNOSIS — I10 ESSENTIAL (PRIMARY) HYPERTENSION: ICD-10-CM

## 2019-07-25 DIAGNOSIS — M25.462 EFFUSION, LEFT KNEE: ICD-10-CM

## 2019-07-25 DIAGNOSIS — R41.82 ALTERED MENTAL STATUS, UNSPECIFIED: ICD-10-CM

## 2019-07-25 LAB
ALBUMIN SERPL ELPH-MCNC: 3.3 G/DL — SIGNIFICANT CHANGE UP (ref 3.3–5)
ALP SERPL-CCNC: 68 U/L — SIGNIFICANT CHANGE UP (ref 40–120)
ALT FLD-CCNC: 25 U/L DA — SIGNIFICANT CHANGE UP (ref 10–45)
ANION GAP SERPL CALC-SCNC: 12 MMOL/L — SIGNIFICANT CHANGE UP (ref 5–17)
APPEARANCE UR: CLEAR — SIGNIFICANT CHANGE UP
APTT BLD: 32.8 SEC — SIGNIFICANT CHANGE UP (ref 27.5–36.3)
AST SERPL-CCNC: 20 U/L — SIGNIFICANT CHANGE UP (ref 10–40)
BACTERIA # UR AUTO: ABNORMAL /HPF
BASOPHILS # BLD AUTO: 0.05 K/UL — SIGNIFICANT CHANGE UP (ref 0–0.2)
BASOPHILS NFR BLD AUTO: 0.6 % — SIGNIFICANT CHANGE UP (ref 0–2)
BILIRUB SERPL-MCNC: 1.3 MG/DL — HIGH (ref 0.2–1.2)
BILIRUB UR-MCNC: NEGATIVE — SIGNIFICANT CHANGE UP
BUN SERPL-MCNC: 16 MG/DL — SIGNIFICANT CHANGE UP (ref 7–23)
CALCIUM SERPL-MCNC: 9.7 MG/DL — SIGNIFICANT CHANGE UP (ref 8.4–10.5)
CHLORIDE SERPL-SCNC: 101 MMOL/L — SIGNIFICANT CHANGE UP (ref 96–108)
CO2 SERPL-SCNC: 23 MMOL/L — SIGNIFICANT CHANGE UP (ref 22–31)
COLOR SPEC: YELLOW — SIGNIFICANT CHANGE UP
COMMENT - URINE: SIGNIFICANT CHANGE UP
CREAT SERPL-MCNC: 1.16 MG/DL — SIGNIFICANT CHANGE UP (ref 0.5–1.3)
DIFF PNL FLD: ABNORMAL
EOSINOPHIL # BLD AUTO: 0.08 K/UL — SIGNIFICANT CHANGE UP (ref 0–0.5)
EOSINOPHIL NFR BLD AUTO: 0.9 % — SIGNIFICANT CHANGE UP (ref 0–6)
EPI CELLS # UR: SIGNIFICANT CHANGE UP
GLUCOSE SERPL-MCNC: 143 MG/DL — HIGH (ref 70–99)
GLUCOSE UR QL: NEGATIVE — SIGNIFICANT CHANGE UP
HCT VFR BLD CALC: 43.6 % — SIGNIFICANT CHANGE UP (ref 39–50)
HGB BLD-MCNC: 14.3 G/DL — SIGNIFICANT CHANGE UP (ref 13–17)
IMM GRANULOCYTES NFR BLD AUTO: 0.5 % — SIGNIFICANT CHANGE UP (ref 0–1.5)
INR BLD: 1.19 RATIO — HIGH (ref 0.88–1.16)
KETONES UR-MCNC: NEGATIVE — SIGNIFICANT CHANGE UP
LACTATE SERPL-SCNC: 1.3 MMOL/L — SIGNIFICANT CHANGE UP (ref 0.7–2)
LEUKOCYTE ESTERASE UR-ACNC: NEGATIVE — SIGNIFICANT CHANGE UP
LYMPHOCYTES # BLD AUTO: 1.59 K/UL — SIGNIFICANT CHANGE UP (ref 1–3.3)
LYMPHOCYTES # BLD AUTO: 18.1 % — SIGNIFICANT CHANGE UP (ref 13–44)
MCHC RBC-ENTMCNC: 29.2 PG — SIGNIFICANT CHANGE UP (ref 27–34)
MCHC RBC-ENTMCNC: 32.8 GM/DL — SIGNIFICANT CHANGE UP (ref 32–36)
MCV RBC AUTO: 89.2 FL — SIGNIFICANT CHANGE UP (ref 80–100)
MONOCYTES # BLD AUTO: 0.8 K/UL — SIGNIFICANT CHANGE UP (ref 0–0.9)
MONOCYTES NFR BLD AUTO: 9.1 % — SIGNIFICANT CHANGE UP (ref 2–14)
NEUTROPHILS # BLD AUTO: 6.24 K/UL — SIGNIFICANT CHANGE UP (ref 1.8–7.4)
NEUTROPHILS NFR BLD AUTO: 70.8 % — SIGNIFICANT CHANGE UP (ref 43–77)
NITRITE UR-MCNC: NEGATIVE — SIGNIFICANT CHANGE UP
NRBC # BLD: 0 /100 WBCS — SIGNIFICANT CHANGE UP (ref 0–0)
PH UR: 5 — SIGNIFICANT CHANGE UP (ref 5–8)
PLATELET # BLD AUTO: 195 K/UL — SIGNIFICANT CHANGE UP (ref 150–400)
POTASSIUM SERPL-MCNC: 4.2 MMOL/L — SIGNIFICANT CHANGE UP (ref 3.5–5.3)
POTASSIUM SERPL-SCNC: 4.2 MMOL/L — SIGNIFICANT CHANGE UP (ref 3.5–5.3)
PROT SERPL-MCNC: 8 G/DL — SIGNIFICANT CHANGE UP (ref 6–8.3)
PROT UR-MCNC: 15
PROTHROM AB SERPL-ACNC: 13.4 SEC — HIGH (ref 10–12.9)
RBC # BLD: 4.89 M/UL — SIGNIFICANT CHANGE UP (ref 4.2–5.8)
RBC # FLD: 13.2 % — SIGNIFICANT CHANGE UP (ref 10.3–14.5)
RBC CASTS # UR COMP ASSIST: ABNORMAL /HPF (ref 0–4)
SODIUM SERPL-SCNC: 136 MMOL/L — SIGNIFICANT CHANGE UP (ref 135–145)
SP GR SPEC: 1.02 — SIGNIFICANT CHANGE UP (ref 1.01–1.02)
UROBILINOGEN FLD QL: NEGATIVE — SIGNIFICANT CHANGE UP
WBC # BLD: 8.8 K/UL — SIGNIFICANT CHANGE UP (ref 3.8–10.5)
WBC # FLD AUTO: 8.8 K/UL — SIGNIFICANT CHANGE UP (ref 3.8–10.5)
WBC UR QL: NEGATIVE /HPF — SIGNIFICANT CHANGE UP (ref 0–5)

## 2019-07-25 PROCEDURE — 73502 X-RAY EXAM HIP UNI 2-3 VIEWS: CPT | Mod: 26,LT

## 2019-07-25 PROCEDURE — 70450 CT HEAD/BRAIN W/O DYE: CPT | Mod: 26

## 2019-07-25 PROCEDURE — 71045 X-RAY EXAM CHEST 1 VIEW: CPT | Mod: 26

## 2019-07-25 PROCEDURE — 99223 1ST HOSP IP/OBS HIGH 75: CPT

## 2019-07-25 PROCEDURE — 99284 EMERGENCY DEPT VISIT MOD MDM: CPT

## 2019-07-25 PROCEDURE — 73564 X-RAY EXAM KNEE 4 OR MORE: CPT | Mod: 26,LT

## 2019-07-25 PROCEDURE — 93010 ELECTROCARDIOGRAM REPORT: CPT

## 2019-07-25 RX ORDER — UBIDECARENONE 100 MG
1 CAPSULE ORAL
Qty: 0 | Refills: 0 | DISCHARGE

## 2019-07-25 RX ORDER — MEMANTINE HYDROCHLORIDE 10 MG/1
0 TABLET ORAL
Qty: 0 | Refills: 0 | DISCHARGE

## 2019-07-25 RX ORDER — TRAZODONE HCL 50 MG
100 TABLET ORAL AT BEDTIME
Refills: 0 | Status: DISCONTINUED | OUTPATIENT
Start: 2019-07-25 | End: 2019-07-27

## 2019-07-25 RX ORDER — RAMIPRIL 5 MG
1 CAPSULE ORAL
Qty: 0 | Refills: 0 | DISCHARGE

## 2019-07-25 RX ORDER — AMLODIPINE BESYLATE 2.5 MG/1
1 TABLET ORAL
Qty: 0 | Refills: 0 | DISCHARGE

## 2019-07-25 RX ORDER — COLCHICINE 0.6 MG
0.6 TABLET ORAL THREE TIMES A DAY
Refills: 0 | Status: COMPLETED | OUTPATIENT
Start: 2019-07-25 | End: 2019-07-26

## 2019-07-25 RX ORDER — COLCHICINE 0.6 MG
0 TABLET ORAL
Qty: 0 | Refills: 0 | DISCHARGE

## 2019-07-25 RX ORDER — ALPRAZOLAM 0.25 MG
0 TABLET ORAL
Qty: 0 | Refills: 0 | DISCHARGE

## 2019-07-25 RX ORDER — QUETIAPINE FUMARATE 200 MG/1
25 TABLET, FILM COATED ORAL AT BEDTIME
Refills: 0 | Status: DISCONTINUED | OUTPATIENT
Start: 2019-07-25 | End: 2019-07-27

## 2019-07-25 RX ORDER — NEBIVOLOL HYDROCHLORIDE 5 MG/1
1 TABLET ORAL
Qty: 0 | Refills: 0 | DISCHARGE

## 2019-07-25 RX ORDER — ATORVASTATIN CALCIUM 80 MG/1
0 TABLET, FILM COATED ORAL
Qty: 0 | Refills: 0 | DISCHARGE

## 2019-07-25 RX ORDER — ACETAMINOPHEN 500 MG
650 TABLET ORAL EVERY 6 HOURS
Refills: 0 | Status: DISCONTINUED | OUTPATIENT
Start: 2019-07-25 | End: 2019-08-09

## 2019-07-25 RX ORDER — SIMETHICONE 80 MG/1
80 TABLET, CHEWABLE ORAL DAILY
Refills: 0 | Status: DISCONTINUED | OUTPATIENT
Start: 2019-07-25 | End: 2019-07-27

## 2019-07-25 RX ORDER — DONEPEZIL HYDROCHLORIDE 10 MG/1
0 TABLET, FILM COATED ORAL
Qty: 0 | Refills: 0 | DISCHARGE

## 2019-07-25 RX ORDER — LATANOPROST 0.05 MG/ML
0 SOLUTION/ DROPS OPHTHALMIC; TOPICAL
Qty: 0 | Refills: 0 | DISCHARGE

## 2019-07-25 RX ORDER — INDOMETHACIN 50 MG
1 CAPSULE ORAL
Qty: 0 | Refills: 0 | DISCHARGE

## 2019-07-25 RX ORDER — ALPRAZOLAM 0.25 MG
0.25 TABLET ORAL
Refills: 0 | Status: DISCONTINUED | OUTPATIENT
Start: 2019-07-25 | End: 2019-07-27

## 2019-07-25 RX ORDER — ZOLPIDEM TARTRATE 10 MG/1
1 TABLET ORAL
Qty: 0 | Refills: 0 | DISCHARGE

## 2019-07-25 RX ORDER — LISINOPRIL 2.5 MG/1
40 TABLET ORAL DAILY
Refills: 0 | Status: DISCONTINUED | OUTPATIENT
Start: 2019-07-25 | End: 2019-07-27

## 2019-07-25 RX ORDER — QUETIAPINE FUMARATE 200 MG/1
25 TABLET, FILM COATED ORAL AT BEDTIME
Refills: 0 | Status: DISCONTINUED | OUTPATIENT
Start: 2019-07-25 | End: 2019-07-25

## 2019-07-25 RX ORDER — SODIUM CHLORIDE 9 MG/ML
2300 INJECTION INTRAMUSCULAR; INTRAVENOUS; SUBCUTANEOUS ONCE
Refills: 0 | Status: COMPLETED | OUTPATIENT
Start: 2019-07-25 | End: 2019-07-25

## 2019-07-25 RX ORDER — HEPARIN SODIUM 5000 [USP'U]/ML
5000 INJECTION INTRAVENOUS; SUBCUTANEOUS EVERY 8 HOURS
Refills: 0 | Status: DISCONTINUED | OUTPATIENT
Start: 2019-07-25 | End: 2019-07-27

## 2019-07-25 RX ORDER — SODIUM CHLORIDE 9 MG/ML
1000 INJECTION INTRAMUSCULAR; INTRAVENOUS; SUBCUTANEOUS
Refills: 0 | Status: COMPLETED | OUTPATIENT
Start: 2019-07-25 | End: 2019-07-26

## 2019-07-25 RX ORDER — DIVALPROEX SODIUM 500 MG/1
0 TABLET, DELAYED RELEASE ORAL
Qty: 0 | Refills: 0 | DISCHARGE

## 2019-07-25 RX ORDER — HALOPERIDOL DECANOATE 100 MG/ML
1 INJECTION INTRAMUSCULAR ONCE
Refills: 0 | Status: COMPLETED | OUTPATIENT
Start: 2019-07-25 | End: 2019-07-25

## 2019-07-25 RX ADMIN — Medication 100 MILLIGRAM(S): at 22:20

## 2019-07-25 RX ADMIN — Medication 0.5 MILLIGRAM(S): at 15:56

## 2019-07-25 RX ADMIN — Medication 650 MILLIGRAM(S): at 23:20

## 2019-07-25 RX ADMIN — SODIUM CHLORIDE 2300 MILLILITER(S): 9 INJECTION INTRAMUSCULAR; INTRAVENOUS; SUBCUTANEOUS at 11:33

## 2019-07-25 RX ADMIN — SODIUM CHLORIDE 2300 MILLILITER(S): 9 INJECTION INTRAMUSCULAR; INTRAVENOUS; SUBCUTANEOUS at 12:33

## 2019-07-25 RX ADMIN — HEPARIN SODIUM 5000 UNIT(S): 5000 INJECTION INTRAVENOUS; SUBCUTANEOUS at 22:21

## 2019-07-25 RX ADMIN — HALOPERIDOL DECANOATE 1 MILLIGRAM(S): 100 INJECTION INTRAMUSCULAR at 16:55

## 2019-07-25 RX ADMIN — LISINOPRIL 40 MILLIGRAM(S): 2.5 TABLET ORAL at 22:20

## 2019-07-25 RX ADMIN — SODIUM CHLORIDE 125 MILLILITER(S): 9 INJECTION INTRAMUSCULAR; INTRAVENOUS; SUBCUTANEOUS at 22:21

## 2019-07-25 RX ADMIN — SODIUM CHLORIDE 125 MILLILITER(S): 9 INJECTION INTRAMUSCULAR; INTRAVENOUS; SUBCUTANEOUS at 18:39

## 2019-07-25 RX ADMIN — Medication 650 MILLIGRAM(S): at 22:21

## 2019-07-25 RX ADMIN — Medication 0.6 MILLIGRAM(S): at 22:20

## 2019-07-25 NOTE — H&P ADULT - REASON FOR ADMISSION
CARDIOVASCULAR - ADULT     Maintains optimal cardiac output and hemodynamic stability Progressing     Absence of cardiac dysrhythmias or at baseline rhythm Progressing        DISCHARGE PLANNING - CARE MANAGEMENT     Discharge to post-acute care or home with appropriate resources Progressing        Potential for Falls     Patient will remain free of falls Progressing        RESPIRATORY - ADULT     Achieves optimal ventilation and oxygenation Progressing Worsening agitation and difficulty ambulating

## 2019-07-25 NOTE — ED ADULT TRIAGE NOTE - CHIEF COMPLAINT QUOTE
Pt BIB EMS from home with c/o worsening altered mental status since Monday. Pt does have hx of dementia. Per EMS family and visiting RN state that the pt is not ambulating as much. Pt presents alert but not oriented to situation or answering questions appropriately.

## 2019-07-25 NOTE — H&P ADULT - PROBLEM SELECTOR PLAN 1
CT head negative for acute pathology, worsening agitation maybe 2/2 worsening dementia vs. polypharmacy as pt has Rx for Alprazolam 0.25mg, 0.5mg, and Zolpidem 0.5mg, trazodone 100mg qHS  - check TSH, vitamin B12, folate, RPR  - will continue Trazodone for now   - will keep Alprazolam for now as needed to avoid withdrawal, consider tapering outpt  - Seroquel PRN for agitation  - will also check PVR to assess for urinary retention  - Neurology, Dr. Moise, consulted, will see pt in AM

## 2019-07-25 NOTE — ED PROVIDER NOTE - OBJECTIVE STATEMENT
77 y/o M PMH Alzheimer Dementia, gout BIB wife and home aide for worsening AMS and difficulty ambulating x 1 week. Wife states she noticed worsening of these sxs x 1 month but at times seemed to improve until this past week. Pt saw [facility]D [facility] 4 days ago who ordered outpt ct head which has not been done yet. Pt's baseline is a&o x self only however when asked what has worsened family give example of asking pt if he wants to eat and instead of usual response of yes or no pt now states "what is eating". + worsening tremor and decreased urination also noted. Unclear if pt unable to ambulate d/t pain or not. Only new medication is benzotropine started 1 month ago.  Denies fever, CP, SOB, abdominal pain, n/v/d.

## 2019-07-25 NOTE — H&P ADULT - HISTORY OF PRESENT ILLNESS
77 yo M with PMHx of Alzheimer Dementia, gout, HTN brought in by wife from home for declining mental status and difficulty ambulating x 1 week. History obtained from pt's wife at bedside. Pt has known dementia but over the past week, his wife has noticed that he had difficulty ambulating with swelling of both feet, decreased PO intake and worsening agitation.Pt was seen by his PCP, Dr. Adame who started treatment for gout and sent Rx for steroids but his wife has not picked up the prescription.  He has been taking Alprazolam for many years, but recently also prescribed Zolpidem at bedtime as needed for increased agitation. His wife also notices decrease in urine output, denies recent illness, falls, LOC, fevers, chills, nausea, vomiting, diarrhea

## 2019-07-25 NOTE — H&P ADULT - NSHPSOCIALHISTORY_GEN_ALL_CORE
Social drinker  Former smoker, 1/2 pack/day x 30 years,quit in 1992  No illicit drug use  Native language [place]  Lives with wife

## 2019-07-25 NOTE — H&P ADULT - ASSESSMENT
77 yo M with PMHx of Alzheimer Dementia, gout, HTN brought in by wife from home for declining mental status and difficulty ambulating x 1 week.    CAPRINI SCORE [CLOT]    AGE RELATED RISK FACTORS                                                       MOBILITY RELATED FACTORS  [ ] Age 41-60 years                                            (1 Point)                  [ ] Bed rest                                                        (1 Point)  [ ] Age: 61-74 years                                           (2 Points)                 [ ] Plaster cast                                                   (2 Points)  [x ] Age= 75 years                                              (3 Points)                 [ ] Bed bound for more than 72 hours                 (2 Points)    DISEASE RELATED RISK FACTORS                                               GENDER SPECIFIC FACTORS  [ ] Edema in the lower extremities                       (1 Point)                  [ ] Pregnancy                                                     (1 Point)  [ ] Varicose veins                                               (1 Point)                  [ ] Post-partum < 6 weeks                                   (1 Point)             [x] BMI > 25 Kg/m2                                            (1 Point)                  [ ] Hormonal therapy  or oral contraception          (1 Point)                 [ ] Sepsis (in the previous month)                        (1 Point)                  [ ] History of pregnancy complications                 (1 point)  [ ] Pneumonia or serious lung disease                                               [ ] Unexplained or recurrent                     (1 Point)           (in the previous month)                               (1 Point)  [ ] Abnormal pulmonary function test                     (1 Point)                 SURGERY RELATED RISK FACTORS  [ ] Acute myocardial infarction                              (1 Point)                 [ ]  Section                                             (1 Point)  [ ] Congestive heart failure (in the previous month)  (1 Point)               [ ] Minor surgery                                                  (1 Point)   [ ] Inflammatory bowel disease                             (1 Point)                 [ ] Arthroscopic surgery                                        (2 Points)  [ ] Central venous access                                      (2 Points)                [ ] General surgery lasting more than 45 minutes   (2 Points)       [ ] Stroke (in the previous month)                          (5 Points)               [ ] Elective arthroplasty                                         (5 Points)                                                                                                                                               HEMATOLOGY RELATED FACTORS                                                 TRAUMA RELATED RISK FACTORS  [ ] Prior episodes of VTE                                     (3 Points)                 [ ] Fracture of the hip, pelvis, or leg                       (5 Points)  [ ] Positive family history for VTE                         (3 Points)                 [ ] Acute spinal cord injury (in the previous month)  (5 Points)  [ ] Prothrombin 73478 A                                     (3 Points)                 [ ] Paralysis  (less than 1 month)                             (5 Points)  [ ] Factor V Leiden                                             (3 Points)                  [ ] Multiple Trauma within 1 month                        (5 Points)  [ ] Lupus anticoagulants                                     (3 Points)                                                           [ ] Anticardiolipin antibodies                               (3 Points)                                                       [ ] High homocysteine in the blood                      (3 Points)                                             [ ] Other congenital or acquired thrombophilia      (3 Points)                                                [ ] Heparin induced thrombocytopenia                  (3 Points)                                          Total Score [     4    ]

## 2019-07-25 NOTE — H&P ADULT - NSHPREVIEWOFSYSTEMS_GEN_ALL_CORE
Limited ROS as patient only answers some questions, appears restless and agitated. He denies chest pain and abdominal pain.

## 2019-07-25 NOTE — ED PROVIDER NOTE - ATTENDING CONTRIBUTION TO CARE
Svetlana with [facility]. 79 y/o M PMH Alzheimer Dementia, gout BIB wife and home aide for worsening AMS and difficulty ambulating x 1 week. Wife states she noticed worsening of these sxs x 1 month but at times seemed to improve until this past week. Pt saw PMD [facility] 4 days ago who ordered outpt ct head which has not been done yet. Pt's baseline is a&o x self only however when asked what has worsened family give example of asking pt if he wants to eat and instead of usual response of yes or no pt now states "what is eating". + worsening tremor and decreased urination also noted. Unclear if pt unable to ambulate d/t pain or not. Only new medication is benzotropine started 1 month ago.  Denies fever, CP, SOB, abdominal pain, n/v/d.  Will check cbc, cmp, ua/ucx, blood cx x 2, lactate, cxr, ct head, xray left hip and knee, ivf, and reassess.  Patient with inability to ambulate. Consider left knee effusion. Plan for xray to r/o fx.   No fx. Patient also with worsening dementia. Wife wants him to start the prednisone that Dr Adame ordered for his gout.   She wants to take him home but understands his inability to ambulate and need to be further evaluated.   d/w hospitalist.     I performed a face to face bedside interview with patient regarding history of present illness, review of symptoms and past medical history. I completed an independent physical exam.  I have discussed the patient's plan of care with Physician Assistant (PA). I agree with note as stated above, having amended the EMR as needed to reflect my findings.   This includes History of Present Illness, HIV, Past Medical/Surgical/Family/Social History, Allergies and Home Medications, Review of Systems, Physical Exam, and any Progress Notes during the time I functioned as the attending physician for this patient.

## 2019-07-25 NOTE — ED PROVIDER NOTE - CLINICAL SUMMARY MEDICAL DECISION MAKING FREE TEXT BOX
77 y/o M PMH Alzheimer Dementia, gout BIB wife and home aide for worsening AMS and difficulty ambulating x 1 week. Wife states she noticed worsening of these sxs x 1 month but at times seemed to improve until this past week. Pt saw PMD [facility] 4 days ago who ordered outpt ct head which has not been done yet. Pt's baseline is a&o x self only however when asked what has worsened family give example of asking pt if he wants to eat and instead of usual response of yes or no pt now states "what is eating". + worsening tremor and decreased urination also noted. Unclear if pt unable to ambulate d/t pain or not. Only new medication is benzotropine started 1 month ago.  Denies fever, CP, SOB, abdominal pain, n/v/d.  Will check cbc, cmp, ua/ucx, blood cx x 2, lactate, cxr, ct head, xray left hip and knee, ivf, and reassess 79 y/o M PMH Alzheimer Dementia, gout BIB wife and home aide for worsening AMS and difficulty ambulating x 1 week. Wife states she noticed worsening of these sxs x 1 month but at times seemed to improve until this past week. Pt saw PMD Lewis 4 days ago who ordered outpt ct head which has not been done yet. Pt's baseline is a&o x self only however when asked what has worsened family give example of asking pt if he wants to eat and instead of usual response of yes or no pt now states "what is eating". + worsening tremor and decreased urination also noted. Unclear if pt unable to ambulate d/t pain or not. Only new medication is benzotropine started 1 month ago.  Denies fever, CP, SOB, abdominal pain, n/v/d.  Will check cbc, cmp, ua/ucx, blood cx x 2, lactate, cxr, ct head, xray left hip and knee, ivf, and reassess.  Patient with inability to ambulate. Consider left knee effusion. Plan for xray to r/o fx.   No fx. Patient also with worsening dementia. Wife wants him to start the prednisone that Dr Adame ordered for his gout.   She wants to take him home but understands his inability to ambulate and need to be further evaluated.

## 2019-07-25 NOTE — H&P ADULT - NSHPLABSRESULTS_GEN_ALL_CORE
.  LABS:                         14.3   8.80  )-----------( 195      ( 2019 11:20 )             43.6         136  |  101  |  16  ----------------------------<  143<H>  4.2   |  23  |  1.16    Ca    9.7      2019 11:20    TPro  8.0  /  Alb  3.3  /  TBili  1.3<H>  /  DBili  x   /  AST  20  /  ALT  25  /  AlkPhos  68      PT/INR - ( 2019 11:20 )   PT: 13.4 sec;   INR: 1.19 ratio         PTT - ( 2019 11:20 )  PTT:32.8 sec  Urinalysis Basic - ( 2019 12:30 )    Color: Yellow / Appearance: Clear / S.020 / pH: x  Gluc: x / Ketone: Negative  / Bili: Negative / Urobili: Negative   Blood: x / Protein: 15 / Nitrite: Negative   Leuk Esterase: Negative / RBC: 5-10 /HPF / WBC Negative /HPF   Sq Epi: x / Non Sq Epi: Neg.-Few / Bacteria: Trace /HPF      Lactate, Blood: 1.3 mmol/L ( @ 11:20)      RADIOLOGY, EKG & ADDITIONAL TESTS: Reviewed.   < from: Xray Knee 4 Views, Left (19 @ 14:20) >    INTERPRETATION:  Left knee. Concern is fracture. 3 views obtained.    There is a moderate-sized knee effusion.    There are degenerative changes most pronounced in the articular patella   and there is some mild loss of joint space in the medial compartment.  No bone destruction or fracture is evident.  MR of the knee on 2003 showed a medial meniscal tear.    IMPRESSION: Significant degeneration and knee effusion.    < from: Xray Hip w/ Pelvis 2 or 3 Views, Left (19 @ 14:19) >  INTERPRETATION:  Date of study: 2019.    Comparison: 4/14/15 CT scan of the abdomen and pelvis.    A single frontal view of the pelvis and two left hip films are  is   submitted on this 78-year-old male with left hip pain.    Radiologic examination of the pelvis and left hip:  No pelvic fracture or subluxation.  No bilateral hip fracture or subluxation.  Age-appropriate underlying bilateral hip osteoarthritic changes seen.  The sacroiliac joints and pubic symphysis remain intact.     Impression:   No fracture-subluxation noted..    < from: CT Head No Cont (19 @ 14:05) >    FINDINGS:    Images are limited by artifact.  There is no acute intracranial hemorrhage or mass effect. There is   cerebral volume loss.  There is no extraaxial fluid collection.   There is no displaced calvarial fracture. The visualized orbits are   within normal limits. Polyp or retention cyst in the left posterior   ethmoid sinus. Right mastoid air cells are underpneumatized. Partially   opacified left mastoid air cells.    IMPRESSION: No acute intracranial hemorrhage or mass effect.       < from: Xray Chest 1 View AP/PA (19 @ 12:26) >    INTERPRETATION:  AP chest on 2019 at 12:06 PM. Patient has   sepsis.    Poor inspiration markedly constricts the chest.  The heart is magnified by technique.  No grosslung consolidations or layering effusions are evident.  Prior studies going back to 2005 showed a stable left base nodule   consistent with granuloma or hamartoma.    This lesion is not demonstrated likely for technical reasons as above.    IMPRESSION: No gross acute finding.

## 2019-07-25 NOTE — ED PROVIDER NOTE - PHYSICAL EXAMINATION
neuro - pt appears to follow commands, able to stick out tongue  and raise b/l ue without difficulty ( mild tremor noted b/l ue however took a lot of help to attempt to get pt to wiggle his toes however pt was able to do it eventually, pt able to bend both knee with very limited rom unclear if d/t pain/weakness/or cognitive function, Pt did apper to have discomfort when fully ranging left knee only and palpation directly over patella, no bony ttp over hip/pelvis/femur/tib/fib/foot/ankle b/l

## 2019-07-25 NOTE — ED ADULT NURSE NOTE - OBJECTIVE STATEMENT
Patient w/ PMH dementia presents to ED from home due to worsening change in mental status over approximately the past 2 days. Patient's care manager is present and states he has had trouble ambulating and decreased urine output. Patient w/ PMH dementia presents to ED from home due to worsening change in mental status over approximately the past 2 days. Patient's care manager is present and states he has had trouble ambulating and decreased urine output and decreased po intake. Patient has bilateral swelling of the feet, caretaker verbalizes a [facility] gout. Patient w/ PMH dementia presents to ED from home due to worsening change in mental status over approximately the past 2 days. Patient's care manager is present and states he has had trouble ambulating and decreased urine output and decreased po intake over this time. Patient has bilateral swelling of the feet, caretaker verbalizes a [facility] gout.

## 2019-07-25 NOTE — H&P ADULT - PROBLEM SELECTOR PLAN 5
Left ankle tender to palpation and swollen. Cr previously elevated to 1.58 on July 16 but now improved to 1.16  - treat acute gout flare with colchicine  - hold off on steroids for now as this can worsen his agitation

## 2019-07-25 NOTE — H&P ADULT - NSHPPHYSICALEXAM_GEN_ALL_CORE
.  VITAL SIGNS:  T(C): 36.6 (07-25-19 @ 15:28), Max: 37.3 (07-25-19 @ 11:20)  T(F): 97.9 (07-25-19 @ 15:28), Max: 99.2 (07-25-19 @ 11:20)  HR: 120 (07-25-19 @ 16:51) (98 - 120)  BP: 174/78 (07-25-19 @ 16:51) (128/83 - 174/78)  BP(mean): --  RR: 16 (07-25-19 @ 16:51) (16 - 20)  SpO2: 96% (07-25-19 @ 16:51) (95% - 96%)  Wt(kg): --    PHYSICAL EXAM:    Constitutional: elderly male, appears restless, repeatedly attempting to get out of bed   Head: NC/AT  Eyes: PERRLA, EOMI, clear conjunctiva  ENT: no nasal discharge; no oropharyngeal erythema or exudates; dry oral mucosa, bright blue discoloration due to pill coating per wife at bedside  Neck: supple; no JVD or thyromegaly  Respiratory: CTA B/L; no W/R/R, no retractions  Cardiac: tachycardic, normal +S1/S2; no M/R/G; PMI non-displaced  Gastrointestinal: soft, distended abdomen, tympanic, non tender to palpation; no rebound or guarding; +BS, no hepatosplenomegaly  Extremities: WWP, no clubbing or cyanosis; left knee with mild effusion, tender to palpation anteriorly. Limited active ROM of left knee due to pain. Bilateral feet edematous to ankles, mildly erythematous but not warm to touch. Left ankle tender to palpation. No tophi noted  Vascular: 2+ radial, DP/PT pulses B/L  Lymphatic: no submandibular or cervical LAD  Neurologic: AAOx1, oriented to self, not to place or time. Pt is not fully cooperative with exam. PERRLA, no facial droop, tongue midline. Pt is able to move all extremities, motor 4+/5 in bilateral UE, 4+ in bilateral LE, sensation intact to light touch throughout. Plantar reflexes downgoing b/l

## 2019-07-25 NOTE — H&P ADULT - PROBLEM SELECTOR PLAN 3
maybe 2/2 pain from ankle swelling which maybe 2/2 gout flare  - treat acute gout flares  - PT evaluation

## 2019-07-25 NOTE — H&P ADULT - PROBLEM SELECTOR PLAN 2
-120s, likely 2/2 component of dehydration in setting of decreased PO intake but also agitation  - check TSH  - continue IVF  - continue Alprazolam to avoid BZ withdrawal  - Seroquel or Haldol as needed for agitation

## 2019-07-25 NOTE — ED ADULT NURSE NOTE - NSIMPLEMENTINTERV_GEN_ALL_ED
Implemented All Fall with Harm Risk Interventions:  [place] to call system. Call bell, personal items and telephone within reach. Instruct patient to call for assistance. Room bathroom lighting operational. Non-slip footwear when patient is off stretcher. Physically safe environment: no spills, clutter or unnecessary equipment. Stretcher in lowest position, wheels locked, appropriate side rails in place. Provide visual cue, wrist band, yellow gown, etc. Monitor gait and stability. Monitor for mental status changes and reorient to person, place, and time. Review medications for side effects contributing to fall risk. Reinforce activity limits and safety measures with patient and family. Provide visual clues: red socks.

## 2019-07-25 NOTE — H&P ADULT - NSICDXPASTSURGICALHX_GEN_ALL_CORE_FT
February 13, 2019      Owen Rebolledo  Child Development [place]  [address]  [facility] [address]  Phone: [phone]  Fax: [phone]       Patient: Chao Sarabia   YOB: 2011  Date of Visit: 02/13/2019    To Whom It May Concern:    Amaury Sarabia  was at [facility] on 02/13/2019.He may return to school on 2/14/2019 with no restrictions. If you have any questions or concerns, or if I can be of further assistance, please do not hesitate to contact me.    Sincerely,    Cony Rodriguez MA      PAST SURGICAL HISTORY:  H/O inguinal hernia repair

## 2019-07-26 DIAGNOSIS — K59.00 CONSTIPATION, UNSPECIFIED: ICD-10-CM

## 2019-07-26 DIAGNOSIS — N17.9 ACUTE KIDNEY FAILURE, UNSPECIFIED: ICD-10-CM

## 2019-07-26 LAB
ANION GAP SERPL CALC-SCNC: 15 MMOL/L — SIGNIFICANT CHANGE UP (ref 5–17)
BUN SERPL-MCNC: 14 MG/DL — SIGNIFICANT CHANGE UP (ref 7–23)
CALCIUM SERPL-MCNC: 10.1 MG/DL — SIGNIFICANT CHANGE UP (ref 8.4–10.5)
CHLORIDE SERPL-SCNC: 102 MMOL/L — SIGNIFICANT CHANGE UP (ref 96–108)
CO2 SERPL-SCNC: 20 MMOL/L — LOW (ref 22–31)
CREAT SERPL-MCNC: 1.43 MG/DL — HIGH (ref 0.5–1.3)
CULTURE RESULTS: NO GROWTH — SIGNIFICANT CHANGE UP
FOLATE SERPL-MCNC: >20 NG/ML — SIGNIFICANT CHANGE UP
GLUCOSE SERPL-MCNC: 168 MG/DL — HIGH (ref 70–99)
MAGNESIUM SERPL-MCNC: 2 MG/DL — SIGNIFICANT CHANGE UP (ref 1.6–2.6)
POTASSIUM SERPL-MCNC: 4.3 MMOL/L — SIGNIFICANT CHANGE UP (ref 3.5–5.3)
POTASSIUM SERPL-SCNC: 4.3 MMOL/L — SIGNIFICANT CHANGE UP (ref 3.5–5.3)
SODIUM SERPL-SCNC: 137 MMOL/L — SIGNIFICANT CHANGE UP (ref 135–145)
SPECIMEN SOURCE: SIGNIFICANT CHANGE UP
T PALLIDUM AB TITR SER: NEGATIVE — SIGNIFICANT CHANGE UP
TSH SERPL-MCNC: 1.02 UIU/ML — SIGNIFICANT CHANGE UP (ref 0.27–4.2)
URATE SERPL-MCNC: 7.2 MG/DL — SIGNIFICANT CHANGE UP (ref 3.4–8.8)
VIT B12 SERPL-MCNC: 432 PG/ML — SIGNIFICANT CHANGE UP (ref 232–1245)

## 2019-07-26 PROCEDURE — 99233 SBSQ HOSP IP/OBS HIGH 50: CPT

## 2019-07-26 PROCEDURE — 74019 RADEX ABDOMEN 2 VIEWS: CPT | Mod: 26

## 2019-07-26 RX ORDER — ONDANSETRON 8 MG/1
4 TABLET, FILM COATED ORAL EVERY 6 HOURS
Refills: 0 | Status: DISCONTINUED | OUTPATIENT
Start: 2019-07-26 | End: 2019-08-09

## 2019-07-26 RX ORDER — DOCUSATE SODIUM 100 MG
100 CAPSULE ORAL
Refills: 0 | Status: DISCONTINUED | OUTPATIENT
Start: 2019-07-26 | End: 2019-07-27

## 2019-07-26 RX ORDER — SENNA PLUS 8.6 MG/1
2 TABLET ORAL AT BEDTIME
Refills: 0 | Status: DISCONTINUED | OUTPATIENT
Start: 2019-07-26 | End: 2019-07-27

## 2019-07-26 RX ORDER — POLYETHYLENE GLYCOL 3350 17 G/17G
17 POWDER, FOR SOLUTION ORAL DAILY
Refills: 0 | Status: DISCONTINUED | OUTPATIENT
Start: 2019-07-26 | End: 2019-07-27

## 2019-07-26 RX ADMIN — Medication 0.6 MILLIGRAM(S): at 16:29

## 2019-07-26 RX ADMIN — Medication 20 MILLIGRAM(S): at 16:29

## 2019-07-26 RX ADMIN — Medication 100 MILLIGRAM(S): at 21:09

## 2019-07-26 RX ADMIN — HEPARIN SODIUM 5000 UNIT(S): 5000 INJECTION INTRAVENOUS; SUBCUTANEOUS at 06:37

## 2019-07-26 RX ADMIN — Medication 100 MILLIGRAM(S): at 16:30

## 2019-07-26 RX ADMIN — POLYETHYLENE GLYCOL 3350 17 GRAM(S): 17 POWDER, FOR SOLUTION ORAL at 10:56

## 2019-07-26 RX ADMIN — LISINOPRIL 40 MILLIGRAM(S): 2.5 TABLET ORAL at 06:37

## 2019-07-26 RX ADMIN — SIMETHICONE 80 MILLIGRAM(S): 80 TABLET, CHEWABLE ORAL at 11:49

## 2019-07-26 RX ADMIN — HEPARIN SODIUM 5000 UNIT(S): 5000 INJECTION INTRAVENOUS; SUBCUTANEOUS at 21:09

## 2019-07-26 RX ADMIN — QUETIAPINE FUMARATE 25 MILLIGRAM(S): 200 TABLET, FILM COATED ORAL at 22:47

## 2019-07-26 RX ADMIN — SENNA PLUS 2 TABLET(S): 8.6 TABLET ORAL at 21:10

## 2019-07-26 RX ADMIN — Medication 0.25 MILLIGRAM(S): at 04:20

## 2019-07-26 RX ADMIN — Medication 0.6 MILLIGRAM(S): at 06:36

## 2019-07-26 RX ADMIN — Medication 5 MILLIGRAM(S): at 08:42

## 2019-07-26 RX ADMIN — HEPARIN SODIUM 5000 UNIT(S): 5000 INJECTION INTRAVENOUS; SUBCUTANEOUS at 16:30

## 2019-07-26 NOTE — PHYSICAL THERAPY INITIAL EVALUATION ADULT - ADDITIONAL COMMENTS
Pt lives in house with wife, 2 sets of 4 steps inside with rails.  Pt is usually independent with ambulation and ADLs at home.  Has no assistive devices.

## 2019-07-26 NOTE — CONSULT NOTE ADULT - SUBJECTIVE AND OBJECTIVE BOX
Neurology consult    MARY [id number]     Patient is a 78y old  Male who presents with a chief complaint of Worsening agitation and difficulty ambulating (26 Jul 2019 09:49)      HPI:  77 yo M with PMHx of Alzheimer Dementia, gout, HTN brought in by wife from home for declining mental status and difficulty ambulating x 1 week. History obtained from pt's wife at bedside. Pt has known dementia but over the past week, his wife has noticed that he had difficulty ambulating with swelling of both feet, decreased PO intake and worsening agitation.Pt was seen by his PCP, Dr. Adame who started treatment for gout and sent Rx for steroids but his wife has not picked up the prescription.  He has been taking Alprazolam for many years, but recently also prescribed Zolpidem at bedtime as needed for increased agitation. His wife also notices decrease in urine output, denies recent illness, falls, LOC, fevers, chills, nausea, vomiting, diarrhea (25 Jul 2019 16:55)        MEDICATIONS    acetaminophen   Tablet .. 650 milliGRAM(s) Oral every 6 hours PRN  ALPRAZolam 0.25 milliGRAM(s) Oral two times a day PRN  bisacodyl 5 milliGRAM(s) Oral every 12 hours PRN  colchicine 0.6 milliGRAM(s) Oral three times a day  docusate sodium 100 milliGRAM(s) Oral two times a day  heparin  Injectable 5000 Unit(s) SubCutaneous every 8 hours  lisinopril 40 milliGRAM(s) Oral daily  polyethylene glycol 3350 17 Gram(s) Oral daily  QUEtiapine 25 milliGRAM(s) Oral at bedtime PRN  senna 2 Tablet(s) Oral at bedtime  simethicone 80 milliGRAM(s) Chew daily  sodium chloride 0.9%. 1000 milliLiter(s) IV Continuous <Continuous>  traZODone 100 milliGRAM(s) Oral at bedtime      PMH: Gout  Hypertension  Dementia  No pertinent past medical history       PSH: H/O inguinal hernia repair  No significant past surgical history      Family history:   FAMILY HISTORY:  Family history of dementia: Mother  FHx: diabetes mellitus      SOCIAL HISTORY:  No history of tobacco or alcohol use     Allergies    No Known Allergies    Intolerances        Height (cm): 170.2 (07-25 @ 17:39)  Weight (kg): 98.2 (07-26 @ 06:00)  BMI (kg/m2): 33.9 (07-26 @ 06:00)    Vital Signs Last 24 Hrs  T(C): 37.3 (26 Jul 2019 06:00), Max: 38.1 (25 Jul 2019 22:10)  T(F): 99.2 (26 Jul 2019 06:00), Max: 100.5 (25 Jul 2019 22:10)  HR: 106 (26 Jul 2019 06:00) (98 - 125)  BP: 154/82 (26 Jul 2019 06:00) (137/67 - 174/78)  BP(mean): --  RR: 20 (26 Jul 2019 06:00) (15 - 20)  SpO2: 100% (26 Jul 2019 06:00) (95% - 100%)      On Neurological Examination:    Head: normocephalic Neck: supple no carotid bruits    Mental Status - Patient is alert vague inattentive confused follows rare 1 step command    Cranial Nerves - PERRL, EOMI, VFF, normal V through XII no nystagmus    Motor Exam :  No drift diffusely weak and hypertonic moves upper greater than lower limbs pin with range motion at knees and ankles     Sensory    Intact to light touch and pinprick     Reflexes:  symmetric @ plantars neutral    Gait -  not able to test                                                                 RADIOLOGY  CT: < from: CT Head No Cont (07.25.19 @ 14:05) >    EXAM:  CT BRAIN      PROCEDURE DATE:  07/25/2019        INTERPRETATION:  CLINICAL INFORMATION: ams. . .    TECHNIQUE: Sequential axial images were obtained from the vertex to the   skull base without intravenous contrast. Coronal and sagittal   reformations were obtained.     COMPARISON: MRI brain 5/29/2013.    FINDINGS:    Images are limited by artifact.    There is no acute intracranial hemorrhage or mass effect. There is   cerebral volume loss.    There is no extraaxial fluid collection.     There is no displaced calvarial fracture. The visualized orbits are   within normal limits. Polyp or retention cyst in the left posterior   ethmoid sinus. Right mastoid air cells are underpneumatized. Partially   opacified left mastoid air cells.    IMPRESSION: No acute intracranial hemorrhage or mass effect.                     < end of copied text >

## 2019-07-26 NOTE — CONSULT NOTE ADULT - ASSESSMENT
77 yo male with progressive Alzheimer's Disease and recent worsening of confusion and inability to ambulate.  Neuro exam is non focal and does not suggest stroke.  CT head is unremarkable.  R/O gout and degenerative joint disease as cause for gait disorder. Note polypharmacy.    REC:  defer further neuro work up, review medication profile as you are doing, consider palliative care consult, consider placement.

## 2019-07-26 NOTE — PROGRESS NOTE ADULT - PROBLEM SELECTOR PLAN 7
continue Lisinopril, follow BP's -possibly flare at left ankle; check uric acid level, stop Colchicine, start oral Prednisone

## 2019-07-26 NOTE — PROGRESS NOTE ADULT - SUBJECTIVE AND OBJECTIVE BOX
Patient is a 78y old  Male who presents with a chief complaint of Worsening agitation and difficulty ambulating (2019 16:55)  Patient seen and examined at bedside.  Pt. confused, poor historian.    ALLERGIES:  No Known Allergies    MEDICATIONS  (STANDING):  colchicine 0.6 milliGRAM(s) Oral three times a day  docusate sodium 100 milliGRAM(s) Oral two times a day  heparin  Injectable 5000 Unit(s) SubCutaneous every 8 hours  lisinopril 40 milliGRAM(s) Oral daily  polyethylene glycol 3350 17 Gram(s) Oral daily  senna 2 Tablet(s) Oral at bedtime  simethicone 80 milliGRAM(s) Chew daily  sodium chloride 0.9%. 1000 milliLiter(s) (125 mL/Hr) IV Continuous <Continuous>  traZODone 100 milliGRAM(s) Oral at bedtime    MEDICATIONS  (PRN):  acetaminophen   Tablet .. 650 milliGRAM(s) Oral every 6 hours PRN Mild Pain (1 - 3)  ALPRAZolam 0.25 milliGRAM(s) Oral two times a day PRN Restlessness/ agitation  bisacodyl 5 milliGRAM(s) Oral every 12 hours PRN Constipation  QUEtiapine 25 milliGRAM(s) Oral at bedtime PRN Agitation    Vital Signs Last 24 Hrs  T(F): 99.2 (2019 03:00), Max: 100.5 (2019 22:10)  HR: 123 (2019 22:10) (98 - 125)  BP: 171/79 (2019 22:10) (128/83 - 174/78)  RR: 15 (2019 22:10) (15 - 20)  SpO2: 95% (2019 22:10) (95% - 96%)  I&O's Summary    PHYSICAL EXAM:  General: NAD, alert x 0.  ENT: MMM  Neck: Supple, No JVD  Lungs: Clear to auscultation bilaterally  Cardio: RRR, S1/S2, No murmurs  Abdomen: ++distention, NT to palp; Bowel sounds present  Extremities: No calf tenderness, left knee with ace wrap, mild edema, left ankle with erythema, edema, mild warmth  Neuro:  pt unable to follow commands    LABS:                        14.3   8.80  )-----------( 195      ( 2019 11:20 )             43.6     07-26    137  |  102  |  14  ----------------------------<  168  4.3   |  20  |  1.43    Ca    10.1      2019 08:10  Mg     2.0         TPro  8.0  /  Alb  3.3  /  TBili  1.3  /  DBili  x   /  AST  20  /  ALT  25  /  AlkPhos  68      eGFR if : 55 mL/min/1.73M2 (19 @ 08:10)  eGFR if Non African American: 47 mL/min/1.73M2 (19 @ 08:10)    PT/INR - ( 2019 11:20 )   PT: 13.4 sec;   INR: 1.19 ratio         PTT - ( 2019 11:20 )  PTT:32.8 sec  Lactate, Blood: 1.3 mmol/L ( @ 11:20)      POCT Blood Glucose.: 140 mg/dL (2019 11:11)      Urinalysis Basic - ( 2019 12:30 )    Color: Yellow / Appearance: Clear / S.020 / pH: x  Gluc: x / Ketone: Negative  / Bili: Negative / Urobili: Negative   Blood: x / Protein: 15 / Nitrite: Negative   Leuk Esterase: Negative / RBC: 5-10 /HPF / WBC Negative /HPF   Sq Epi: x / Non Sq Epi: Neg.-Few / Bacteria: Trace /HPF          RADIOLOGY & ADDITIONAL TESTS:    Care Discussed with Consultants/Other Providers:

## 2019-07-26 NOTE — PROGRESS NOTE ADULT - PROBLEM SELECTOR PLAN 1
CT head negative for acute pathology, probably worsening Dementia and polypharmacy as pt on Alprazolam, Zolpidem  and trazodone at home, will only continue Trazodone scheduled; Seroquel added on prn, Xanax prn  - TSH, vitamin B12, folate, RPR; all results still pending  -Neurology ludwin Moise; per our conversation ...pt with progressive Severe Dementia

## 2019-07-26 NOTE — PROGRESS NOTE ADULT - PROBLEM SELECTOR PLAN 5
2/2 osteoarthritis seen on knee Xray, Tylenol PRN for pain start bowel regimen, check ABD XR to r/o obstruction

## 2019-07-26 NOTE — PROGRESS NOTE ADULT - PROBLEM SELECTOR PLAN 6
-possibly flare at left ankle; check uric acid level, continue Colchicine for now 2/2 osteoarthritis seen on knee Xray, Tylenol PRN for pain

## 2019-07-26 NOTE — PROGRESS NOTE ADULT - ASSESSMENT
79 yo M with PMHx of Alzheimer Dementia, gout, HTN brought in by wife from home for declining mental status and difficulty ambulating x 1 week.

## 2019-07-27 DIAGNOSIS — G30.9 ALZHEIMER'S DISEASE, UNSPECIFIED: ICD-10-CM

## 2019-07-27 DIAGNOSIS — E87.2 ACIDOSIS: ICD-10-CM

## 2019-07-27 DIAGNOSIS — Z29.9 ENCOUNTER FOR PROPHYLACTIC MEASURES, UNSPECIFIED: ICD-10-CM

## 2019-07-27 DIAGNOSIS — J96.90 RESPIRATORY FAILURE, UNSPECIFIED, UNSPECIFIED WHETHER WITH HYPOXIA OR HYPERCAPNIA: ICD-10-CM

## 2019-07-27 DIAGNOSIS — K92.2 GASTROINTESTINAL HEMORRHAGE, UNSPECIFIED: ICD-10-CM

## 2019-07-27 LAB
ABO RH CONFIRMATION: SIGNIFICANT CHANGE UP
ALBUMIN SERPL ELPH-MCNC: 2.2 G/DL — LOW (ref 3.3–5)
ALP SERPL-CCNC: 48 U/L — SIGNIFICANT CHANGE UP (ref 40–120)
ALT FLD-CCNC: 15 U/L DA — SIGNIFICANT CHANGE UP (ref 10–45)
ANION GAP SERPL CALC-SCNC: 10 MMOL/L — SIGNIFICANT CHANGE UP (ref 5–17)
ANION GAP SERPL CALC-SCNC: 13 MMOL/L — SIGNIFICANT CHANGE UP (ref 5–17)
ANION GAP SERPL CALC-SCNC: 15 MMOL/L — SIGNIFICANT CHANGE UP (ref 5–17)
APPEARANCE UR: CLEAR — SIGNIFICANT CHANGE UP
AST SERPL-CCNC: 20 U/L — SIGNIFICANT CHANGE UP (ref 10–40)
BACTERIA # UR AUTO: ABNORMAL /HPF
BILIRUB SERPL-MCNC: 0.7 MG/DL — SIGNIFICANT CHANGE UP (ref 0.2–1.2)
BILIRUB UR-MCNC: NEGATIVE — SIGNIFICANT CHANGE UP
BLD GP AB SCN SERPL QL: SIGNIFICANT CHANGE UP
BUN SERPL-MCNC: 52 MG/DL — HIGH (ref 7–23)
BUN SERPL-MCNC: 69 MG/DL — HIGH (ref 7–23)
BUN SERPL-MCNC: 70 MG/DL — HIGH (ref 7–23)
CALCIUM SERPL-MCNC: 8.6 MG/DL — SIGNIFICANT CHANGE UP (ref 8.4–10.5)
CALCIUM SERPL-MCNC: 9.3 MG/DL — SIGNIFICANT CHANGE UP (ref 8.4–10.5)
CALCIUM SERPL-MCNC: 9.9 MG/DL — SIGNIFICANT CHANGE UP (ref 8.4–10.5)
CHLORIDE SERPL-SCNC: 103 MMOL/L — SIGNIFICANT CHANGE UP (ref 96–108)
CHLORIDE SERPL-SCNC: 103 MMOL/L — SIGNIFICANT CHANGE UP (ref 96–108)
CHLORIDE SERPL-SCNC: 105 MMOL/L — SIGNIFICANT CHANGE UP (ref 96–108)
CK MB BLD-MCNC: 1.7 % — SIGNIFICANT CHANGE UP (ref 0–3.5)
CK MB CFR SERPL CALC: 4 NG/ML — SIGNIFICANT CHANGE UP (ref 0.5–10)
CK SERPL-CCNC: 236 U/L — HIGH (ref 30–200)
CO2 BLDA-SCNC: 21 MMOL/L — LOW (ref 22–30)
CO2 BLDA-SCNC: 21 MMOL/L — LOW (ref 22–30)
CO2 SERPL-SCNC: 21 MMOL/L — LOW (ref 22–31)
CO2 SERPL-SCNC: 22 MMOL/L — SIGNIFICANT CHANGE UP (ref 22–31)
CO2 SERPL-SCNC: 25 MMOL/L — SIGNIFICANT CHANGE UP (ref 22–31)
COLOR SPEC: YELLOW — SIGNIFICANT CHANGE UP
CREAT SERPL-MCNC: 3.14 MG/DL — HIGH (ref 0.5–1.3)
CREAT SERPL-MCNC: 3.27 MG/DL — HIGH (ref 0.5–1.3)
CREAT SERPL-MCNC: 3.79 MG/DL — HIGH (ref 0.5–1.3)
DIFF PNL FLD: ABNORMAL
EPI CELLS # UR: SIGNIFICANT CHANGE UP
GLUCOSE BLDC GLUCOMTR-MCNC: 140 MG/DL — HIGH (ref 70–99)
GLUCOSE BLDC GLUCOMTR-MCNC: 174 MG/DL — HIGH (ref 70–99)
GLUCOSE BLDC GLUCOMTR-MCNC: 244 MG/DL — HIGH (ref 70–99)
GLUCOSE SERPL-MCNC: 137 MG/DL — HIGH (ref 70–99)
GLUCOSE SERPL-MCNC: 226 MG/DL — HIGH (ref 70–99)
GLUCOSE SERPL-MCNC: 251 MG/DL — HIGH (ref 70–99)
GLUCOSE UR QL: NEGATIVE — SIGNIFICANT CHANGE UP
GRAM STN FLD: SIGNIFICANT CHANGE UP
HCT VFR BLD CALC: 28.4 % — LOW (ref 39–50)
HCT VFR BLD CALC: 31.4 % — LOW (ref 39–50)
HCT VFR BLD CALC: 38.6 % — LOW (ref 39–50)
HGB BLD-MCNC: 10.4 G/DL — LOW (ref 13–17)
HGB BLD-MCNC: 12.7 G/DL — LOW (ref 13–17)
HGB BLD-MCNC: 9.4 G/DL — LOW (ref 13–17)
HOROWITZ INDEX BLDA+IHG-RTO: SIGNIFICANT CHANGE UP
HOROWITZ INDEX BLDA+IHG-RTO: SIGNIFICANT CHANGE UP
KETONES UR-MCNC: NEGATIVE — SIGNIFICANT CHANGE UP
LACTATE SERPL-SCNC: 1.5 MMOL/L — SIGNIFICANT CHANGE UP (ref 0.7–2)
LACTATE SERPL-SCNC: 1.9 MMOL/L — SIGNIFICANT CHANGE UP (ref 0.7–2)
LACTATE SERPL-SCNC: 2.5 MMOL/L — HIGH (ref 0.7–2)
LEUKOCYTE ESTERASE UR-ACNC: NEGATIVE — SIGNIFICANT CHANGE UP
MCHC RBC-ENTMCNC: 29.5 PG — SIGNIFICANT CHANGE UP (ref 27–34)
MCHC RBC-ENTMCNC: 29.6 PG — SIGNIFICANT CHANGE UP (ref 27–34)
MCHC RBC-ENTMCNC: 29.7 PG — SIGNIFICANT CHANGE UP (ref 27–34)
MCHC RBC-ENTMCNC: 32.9 GM/DL — SIGNIFICANT CHANGE UP (ref 32–36)
MCHC RBC-ENTMCNC: 33.1 GM/DL — SIGNIFICANT CHANGE UP (ref 32–36)
MCHC RBC-ENTMCNC: 33.1 GM/DL — SIGNIFICANT CHANGE UP (ref 32–36)
MCV RBC AUTO: 89.2 FL — SIGNIFICANT CHANGE UP (ref 80–100)
MCV RBC AUTO: 89.3 FL — SIGNIFICANT CHANGE UP (ref 80–100)
MCV RBC AUTO: 90.2 FL — SIGNIFICANT CHANGE UP (ref 80–100)
NITRITE UR-MCNC: NEGATIVE — SIGNIFICANT CHANGE UP
NRBC # BLD: 0 /100 WBCS — SIGNIFICANT CHANGE UP (ref 0–0)
NRBC # BLD: 0 /100 WBCS — SIGNIFICANT CHANGE UP (ref 0–0)
PCO2 BLDA: 34 MMHG — SIGNIFICANT CHANGE UP (ref 32–46)
PCO2 BLDA: 36 MMHG — SIGNIFICANT CHANGE UP (ref 32–46)
PH BLDA: 7.37 — SIGNIFICANT CHANGE UP (ref 7.35–7.45)
PH BLDA: 7.4 — SIGNIFICANT CHANGE UP (ref 7.35–7.45)
PH UR: 5 — SIGNIFICANT CHANGE UP (ref 5–8)
PLATELET # BLD AUTO: 204 K/UL — SIGNIFICANT CHANGE UP (ref 150–400)
PLATELET # BLD AUTO: 204 K/UL — SIGNIFICANT CHANGE UP (ref 150–400)
PLATELET # BLD AUTO: 280 K/UL — SIGNIFICANT CHANGE UP (ref 150–400)
PO2 BLDA: 81 MMHG — SIGNIFICANT CHANGE UP (ref 74–108)
PO2 BLDA: 84 MMHG — SIGNIFICANT CHANGE UP (ref 74–108)
POTASSIUM SERPL-MCNC: 3.7 MMOL/L — SIGNIFICANT CHANGE UP (ref 3.5–5.3)
POTASSIUM SERPL-MCNC: 4.4 MMOL/L — SIGNIFICANT CHANGE UP (ref 3.5–5.3)
POTASSIUM SERPL-MCNC: 4.7 MMOL/L — SIGNIFICANT CHANGE UP (ref 3.5–5.3)
POTASSIUM SERPL-SCNC: 3.7 MMOL/L — SIGNIFICANT CHANGE UP (ref 3.5–5.3)
POTASSIUM SERPL-SCNC: 4.4 MMOL/L — SIGNIFICANT CHANGE UP (ref 3.5–5.3)
POTASSIUM SERPL-SCNC: 4.7 MMOL/L — SIGNIFICANT CHANGE UP (ref 3.5–5.3)
PROT SERPL-MCNC: 5.9 G/DL — LOW (ref 6–8.3)
PROT UR-MCNC: 15
RBC # BLD: 3.18 M/UL — LOW (ref 4.2–5.8)
RBC # BLD: 3.52 M/UL — LOW (ref 4.2–5.8)
RBC # BLD: 4.28 M/UL — SIGNIFICANT CHANGE UP (ref 4.2–5.8)
RBC # FLD: 13.2 % — SIGNIFICANT CHANGE UP (ref 10.3–14.5)
RBC # FLD: 13.4 % — SIGNIFICANT CHANGE UP (ref 10.3–14.5)
RBC # FLD: 13.5 % — SIGNIFICANT CHANGE UP (ref 10.3–14.5)
RBC CASTS # UR COMP ASSIST: ABNORMAL /HPF (ref 0–4)
SAO2 % BLDA: 95 % — SIGNIFICANT CHANGE UP (ref 92–96)
SAO2 % BLDA: 95 % — SIGNIFICANT CHANGE UP (ref 92–96)
SODIUM SERPL-SCNC: 138 MMOL/L — SIGNIFICANT CHANGE UP (ref 135–145)
SODIUM SERPL-SCNC: 139 MMOL/L — SIGNIFICANT CHANGE UP (ref 135–145)
SODIUM SERPL-SCNC: 140 MMOL/L — SIGNIFICANT CHANGE UP (ref 135–145)
SP GR SPEC: 1.02 — SIGNIFICANT CHANGE UP (ref 1.01–1.02)
SPECIMEN SOURCE: SIGNIFICANT CHANGE UP
TROPONIN I SERPL-MCNC: 0.05 NG/ML — SIGNIFICANT CHANGE UP (ref 0.02–0.06)
TROPONIN I SERPL-MCNC: 0.05 NG/ML — SIGNIFICANT CHANGE UP (ref 0.02–0.06)
TROPONIN I SERPL-MCNC: 0.06 NG/ML — HIGH (ref 0.02–0.06)
TROPONIN I SERPL-MCNC: <.017 NG/ML — LOW (ref 0.02–0.06)
UROBILINOGEN FLD QL: NEGATIVE — SIGNIFICANT CHANGE UP
WBC # BLD: 12.02 K/UL — HIGH (ref 3.8–10.5)
WBC # BLD: 14.8 K/UL — HIGH (ref 3.8–10.5)
WBC # BLD: 22.58 K/UL — HIGH (ref 3.8–10.5)
WBC # FLD AUTO: 12.02 K/UL — HIGH (ref 3.8–10.5)
WBC # FLD AUTO: 14.8 K/UL — HIGH (ref 3.8–10.5)
WBC # FLD AUTO: 22.58 K/UL — HIGH (ref 3.8–10.5)
WBC UR QL: SIGNIFICANT CHANGE UP /HPF (ref 0–5)

## 2019-07-27 PROCEDURE — 93010 ELECTROCARDIOGRAM REPORT: CPT

## 2019-07-27 PROCEDURE — 71045 X-RAY EXAM CHEST 1 VIEW: CPT | Mod: 26,76

## 2019-07-27 PROCEDURE — 99222 1ST HOSP IP/OBS MODERATE 55: CPT

## 2019-07-27 PROCEDURE — 99233 SBSQ HOSP IP/OBS HIGH 50: CPT

## 2019-07-27 RX ORDER — PIPERACILLIN AND TAZOBACTAM 4; .5 G/20ML; G/20ML
3.38 INJECTION, POWDER, LYOPHILIZED, FOR SOLUTION INTRAVENOUS EVERY 8 HOURS
Refills: 0 | Status: DISCONTINUED | OUTPATIENT
Start: 2019-07-27 | End: 2019-07-29

## 2019-07-27 RX ORDER — SODIUM CHLORIDE 9 MG/ML
1000 INJECTION, SOLUTION INTRAVENOUS
Refills: 0 | Status: DISCONTINUED | OUTPATIENT
Start: 2019-07-27 | End: 2019-07-28

## 2019-07-27 RX ORDER — PIPERACILLIN AND TAZOBACTAM 4; .5 G/20ML; G/20ML
INJECTION, POWDER, LYOPHILIZED, FOR SOLUTION INTRAVENOUS
Refills: 0 | Status: DISCONTINUED | OUTPATIENT
Start: 2019-07-27 | End: 2019-07-27

## 2019-07-27 RX ORDER — DEXTROSE 50 % IN WATER 50 %
25 SYRINGE (ML) INTRAVENOUS ONCE
Refills: 0 | Status: DISCONTINUED | OUTPATIENT
Start: 2019-07-27 | End: 2019-08-09

## 2019-07-27 RX ORDER — DEXTROSE 50 % IN WATER 50 %
15 SYRINGE (ML) INTRAVENOUS ONCE
Refills: 0 | Status: DISCONTINUED | OUTPATIENT
Start: 2019-07-27 | End: 2019-07-30

## 2019-07-27 RX ORDER — SODIUM CHLORIDE 9 MG/ML
1000 INJECTION, SOLUTION INTRAVENOUS ONCE
Refills: 0 | Status: COMPLETED | OUTPATIENT
Start: 2019-07-27 | End: 2019-07-27

## 2019-07-27 RX ORDER — NOREPINEPHRINE BITARTRATE/D5W 8 MG/250ML
0.05 PLASTIC BAG, INJECTION (ML) INTRAVENOUS
Qty: 8 | Refills: 0 | Status: DISCONTINUED | OUTPATIENT
Start: 2019-07-27 | End: 2019-07-29

## 2019-07-27 RX ORDER — PIPERACILLIN AND TAZOBACTAM 4; .5 G/20ML; G/20ML
3.38 INJECTION, POWDER, LYOPHILIZED, FOR SOLUTION INTRAVENOUS ONCE
Refills: 0 | Status: COMPLETED | OUTPATIENT
Start: 2019-07-27 | End: 2019-07-27

## 2019-07-27 RX ORDER — INSULIN LISPRO 100/ML
VIAL (ML) SUBCUTANEOUS EVERY 6 HOURS
Refills: 0 | Status: DISCONTINUED | OUTPATIENT
Start: 2019-07-27 | End: 2019-07-30

## 2019-07-27 RX ORDER — PANTOPRAZOLE SODIUM 20 MG/1
40 TABLET, DELAYED RELEASE ORAL ONCE
Refills: 0 | Status: COMPLETED | OUTPATIENT
Start: 2019-07-27 | End: 2019-07-27

## 2019-07-27 RX ORDER — SODIUM CHLORIDE 9 MG/ML
10 INJECTION INTRAMUSCULAR; INTRAVENOUS; SUBCUTANEOUS
Refills: 0 | Status: DISCONTINUED | OUTPATIENT
Start: 2019-07-27 | End: 2019-08-09

## 2019-07-27 RX ORDER — GLUCAGON INJECTION, SOLUTION 0.5 MG/.1ML
1 INJECTION, SOLUTION SUBCUTANEOUS ONCE
Refills: 0 | Status: DISCONTINUED | OUTPATIENT
Start: 2019-07-27 | End: 2019-07-30

## 2019-07-27 RX ORDER — FUROSEMIDE 40 MG
80 TABLET ORAL ONCE
Refills: 0 | Status: DISCONTINUED | OUTPATIENT
Start: 2019-07-27 | End: 2019-07-27

## 2019-07-27 RX ORDER — FUROSEMIDE 40 MG
TABLET ORAL
Refills: 0 | Status: DISCONTINUED | OUTPATIENT
Start: 2019-07-27 | End: 2019-07-27

## 2019-07-27 RX ORDER — SODIUM CHLORIDE 9 MG/ML
2000 INJECTION, SOLUTION INTRAVENOUS ONCE
Refills: 0 | Status: COMPLETED | OUTPATIENT
Start: 2019-07-27 | End: 2019-07-27

## 2019-07-27 RX ORDER — PANTOPRAZOLE SODIUM 20 MG/1
40 TABLET, DELAYED RELEASE ORAL
Refills: 0 | Status: DISCONTINUED | OUTPATIENT
Start: 2019-07-27 | End: 2019-07-29

## 2019-07-27 RX ORDER — IPRATROPIUM/ALBUTEROL SULFATE 18-103MCG
3 AEROSOL WITH ADAPTER (GRAM) INHALATION EVERY 6 HOURS
Refills: 0 | Status: DISCONTINUED | OUTPATIENT
Start: 2019-07-27 | End: 2019-07-27

## 2019-07-27 RX ORDER — FUROSEMIDE 40 MG
80 TABLET ORAL DAILY
Refills: 0 | Status: DISCONTINUED | OUTPATIENT
Start: 2019-07-27 | End: 2019-07-27

## 2019-07-27 RX ORDER — DEXTROSE 50 % IN WATER 50 %
12.5 SYRINGE (ML) INTRAVENOUS ONCE
Refills: 0 | Status: DISCONTINUED | OUTPATIENT
Start: 2019-07-27 | End: 2019-08-09

## 2019-07-27 RX ORDER — CHLORHEXIDINE GLUCONATE 213 G/1000ML
15 SOLUTION TOPICAL EVERY 12 HOURS
Refills: 0 | Status: DISCONTINUED | OUTPATIENT
Start: 2019-07-27 | End: 2019-07-29

## 2019-07-27 RX ORDER — CHLORHEXIDINE GLUCONATE 213 G/1000ML
1 SOLUTION TOPICAL
Refills: 0 | Status: DISCONTINUED | OUTPATIENT
Start: 2019-07-27 | End: 2019-07-30

## 2019-07-27 RX ORDER — VANCOMYCIN HCL 1 G
1000 VIAL (EA) INTRAVENOUS ONCE
Refills: 0 | Status: COMPLETED | OUTPATIENT
Start: 2019-07-27 | End: 2019-07-27

## 2019-07-27 RX ORDER — PROPOFOL 10 MG/ML
5 INJECTION, EMULSION INTRAVENOUS
Qty: 1000 | Refills: 0 | Status: DISCONTINUED | OUTPATIENT
Start: 2019-07-27 | End: 2019-07-30

## 2019-07-27 RX ORDER — DEXMEDETOMIDINE HYDROCHLORIDE IN 0.9% SODIUM CHLORIDE 4 UG/ML
0.2 INJECTION INTRAVENOUS
Qty: 200 | Refills: 0 | Status: DISCONTINUED | OUTPATIENT
Start: 2019-07-27 | End: 2019-07-30

## 2019-07-27 RX ORDER — SODIUM CHLORIDE 9 MG/ML
1000 INJECTION, SOLUTION INTRAVENOUS
Refills: 0 | Status: DISCONTINUED | OUTPATIENT
Start: 2019-07-27 | End: 2019-08-09

## 2019-07-27 RX ADMIN — SODIUM CHLORIDE 1000 MILLILITER(S): 9 INJECTION, SOLUTION INTRAVENOUS at 11:20

## 2019-07-27 RX ADMIN — CHLORHEXIDINE GLUCONATE 1 APPLICATION(S): 213 SOLUTION TOPICAL at 14:23

## 2019-07-27 RX ADMIN — CHLORHEXIDINE GLUCONATE 15 MILLILITER(S): 213 SOLUTION TOPICAL at 18:08

## 2019-07-27 RX ADMIN — PANTOPRAZOLE SODIUM 40 MILLIGRAM(S): 20 TABLET, DELAYED RELEASE ORAL at 12:22

## 2019-07-27 RX ADMIN — HEPARIN SODIUM 5000 UNIT(S): 5000 INJECTION INTRAVENOUS; SUBCUTANEOUS at 05:50

## 2019-07-27 RX ADMIN — SODIUM CHLORIDE 1000 MILLILITER(S): 9 INJECTION, SOLUTION INTRAVENOUS at 19:00

## 2019-07-27 RX ADMIN — PANTOPRAZOLE SODIUM 40 MILLIGRAM(S): 20 TABLET, DELAYED RELEASE ORAL at 18:08

## 2019-07-27 RX ADMIN — PIPERACILLIN AND TAZOBACTAM 25 GRAM(S): 4; .5 INJECTION, POWDER, LYOPHILIZED, FOR SOLUTION INTRAVENOUS at 21:11

## 2019-07-27 RX ADMIN — Medication 100 MILLIGRAM(S): at 05:50

## 2019-07-27 RX ADMIN — SODIUM CHLORIDE 125 MILLILITER(S): 9 INJECTION, SOLUTION INTRAVENOUS at 14:22

## 2019-07-27 RX ADMIN — LISINOPRIL 40 MILLIGRAM(S): 2.5 TABLET ORAL at 05:50

## 2019-07-27 RX ADMIN — Medication 9.21 MICROGRAM(S)/KG/MIN: at 22:26

## 2019-07-27 RX ADMIN — PIPERACILLIN AND TAZOBACTAM 200 GRAM(S): 4; .5 INJECTION, POWDER, LYOPHILIZED, FOR SOLUTION INTRAVENOUS at 11:55

## 2019-07-27 RX ADMIN — Medication 250 MILLIGRAM(S): at 12:20

## 2019-07-27 RX ADMIN — PROPOFOL 2.95 MICROGRAM(S)/KG/MIN: 10 INJECTION, EMULSION INTRAVENOUS at 12:22

## 2019-07-27 RX ADMIN — PIPERACILLIN AND TAZOBACTAM 25 GRAM(S): 4; .5 INJECTION, POWDER, LYOPHILIZED, FOR SOLUTION INTRAVENOUS at 14:29

## 2019-07-27 RX ADMIN — Medication 20 MILLIGRAM(S): at 06:12

## 2019-07-27 NOTE — CONSULT NOTE ADULT - SUBJECTIVE AND OBJECTIVE BOX
Source:  Chart  Reliability:  Fair    CC:  Respiratory distress this AM    HPI  78 year old male with PMH Alzheimer Dementia, gout, HTN brought in by wife from home for declining mental status and difficulty ambulating x 1 week.  CT head had been negative for changes and patient was being evaluated for polypharmacy.  Reportedly agitated overnight, this morning was more lethargic and noted to be gurgling with increased respiratory effort.  Given Lasix IV, RRT called.    During RRT patient orally suctioned and began vomiting dark brown material, oxygen saturation to 86 on NC.  NGT placed with approximately 300cc of dark brown material drained.  Orally intubated for airway protection, vomitus suctioned from ETT and brought to the ICU for further care, currently sedated on propofol.    PMH  Gout  Hypertension  Alzheimer's Dementia    PSH  Inguinal hernia repair, timing and hospital unknown at this time    Allergies  NKDA or intolerances    Current Medications  ALBUTerol/ipratropium for Nebulization 3 milliLiter(s) Nebulizer every 6 hours  chlorhexidine 0.12% Liquid 15 milliLiter(s) Oral Mucosa every 12 hours  norepinephrine Infusion 0.05 MICROgram(s)/kG/Min (9.206 mL/Hr) IV Continuous <Continuous>  pantoprazole  Injectable 40 milliGRAM(s) IV Push two times a day  piperacillin/tazobactam IVPB.. 3.375 Gram(s) IV Intermittent every 8 hours  propofol Infusion 5 MICROgram(s)/kG/Min (2.946 mL/Hr) IV Continuous <Continuous>  vancomycin  IVPB 1000 milliGRAM(s) IV Intermittent once  acetaminophen   Tablet .. 650 milliGRAM(s) Oral every 6 hours PRN Mild Pain (1 - 3)  ondansetron Injectable 4 milliGRAM(s) IV Push every 6 hours PRN Nausea and/or Vomiting    Psoc  "Social drinker" documented, no further details noted  Former smoker, 1/2 pack/day x 30 years,quit in 1992  No illicit drug use  Native language Lao  Lives with wife in private home who is primary caregiver    [employer]  Family history of dementia, Mother  FHx: diabetes mellitus, no documentation as to where in family    Review of Systems  Unable to elicit at this time    Labs                        12.7   22.58 )-----------( 280      ( 27 Jul 2019 10:50 )             38.6     139  |  103  |  69<H>  ----------------------------<  251<H>  4.7   |  21<L>  |  3.79<H>  Ca    9.3      27 Jul 2019 10:50  Mg     2.0     07-26    Blood Gas Profile - Arterial, pre-intubation  (07.27.19 @ 10:35)    pH, Arterial: 7.37    pCO2, Arterial: 36 mmHg    pO2, Arterial: 81 mmHg    Oxygen Saturation, Arterial: 95 %    Total CO2, Arterial: 21 mmol/l    FIO2, Arterial: 5L:    CARDIAC MARKERS ( 27 Jul 2019 10:50 )  <.017 ng/mL / x     / x     / x     / x        Lactic Acid Trend  07-25-19 @ 11:20   -   1.3    Culture - Urine (collected 25 Jul 2019 12:30)  Source: .Urine Clean Catch (Midstream)  Final Report (26 Jul 2019 12:45):    No growth    Culture - Blood (collected 25 Jul 2019 11:20)  Source: .Blood Blood-Peripheral  Preliminary Report (26 Jul 2019 16:01):    No growth to date.    Culture - Blood (collected 25 Jul 2019 11:20)  Source: .Blood Blood-Peripheral  Preliminary Report (26 Jul 2019 16:01):    No growth to date.    Radiology   Elevated right hemidiaphragm  Pulmonary vascular congestion  Cardiomegaly  ETT, RIJ TLC and NGT in good position    Vital Signs Last 24 Hrs  T(C): 36.7 (27 Jul 2019 05:37), Max: 37.8 (26 Jul 2019 16:26)  T(F): 98.1 (27 Jul 2019 05:37), Max: 100.1 (26 Jul 2019 16:26)  HR: 131 (27 Jul 2019 11:22) (120 - 140)  BP: 111/71 (27 Jul 2019 05:37) (111/71 - 147/86)  RR: 16 (27 Jul 2019 05:37) (15 - 16)  SpO2: 95% (27 Jul 2019 11:22) (92% - 96%)    Physical Exam  Gen:  WN/WD male resting in bed, orally intubated and sedated  ENT:  NC/AT, no JVD noted.  RIJ TLC with dressing C/D/I.  NGT in place with dark brown drainage   Thorax:  Symmetric, no retractions  Lung:  Coarse rhonchi throughout  CV:  S1, S2. RRR  Abd:  Softly distended NT to palp.  BS normoactive, no masses to palp  Extrem:  No C/C/E, DP/radial pulses +2  Neuro:  Moves all extremities, does not follow commands

## 2019-07-27 NOTE — CONSULT NOTE ADULT - PROBLEM SELECTOR RECOMMENDATION 5
Currently normotensive and may represent intravascular volume depletion versus sepsis with component of effect of sedation.  Continue to monitor, restart antihypertensives as tolerated

## 2019-07-27 NOTE — CONSULT NOTE ADULT - ATTENDING COMMENTS
Patient seen and examined with PA, Addendum to above.    78 year old male with PMH Alzheimer Dementia, HTN admitted to medical service. RRT called today for respiratory distress. At time of evaluation noted with audible gurgling, tachycardic with hypoxia. Appears patient may have aspirated. NGT placed with ~300 cc, of dark colored vomitus obtained. Decision made to intubate the patient for airway protection. Labs obtained, appears he is hypovolemic probably due to the vomiting. Lactate level is elevated, possible developing sepsis due to aspiration event, but likely as he is dehydrated.     Assessment:  1. Acute respiratory failure  2. Aspiration Pneumonitis   3. Acute renal failure  4. Hypotension   5. Sepsis  6. Emesis   7. Acute blood loss anemia   8. Alzheimer's disease   9. Lactic acidosis    - Admit patient to ICU   - Mechanical ventilation with lung protective strategy   - Sedated for ventilator synchrony   - Obtain Blood, sputum cultures as well as UA   - Broad spectrum antibiotics   - Marrero placed to monitor urine output  - Had been given 1 L NS during the RRT, another 2L of LR given in the ICU to complete 30 cc/kg IV fluids   - Lactate level 2.5, will continue to trend post fluid resuscitation  - Hold all antihypertensives  - Hold all PO medications for now   - GI consulted given evidence of dark colored emesis   - PPI IV BID   - EKG noted with sinus tachycardia, initial troponin unremarkable  - Trend cardiac markers   - Discussed with Wife at bedside  - SCD for DVT prophylaxis

## 2019-07-27 NOTE — CONSULT NOTE ADULT - PROBLEM SELECTOR RECOMMENDATION 9
PPI for possible GIB  SCD's only for DVT   NPO and bedrest  Full code status  Wife updated by attending at bedside From aspiration event, uncertain why patient aspirating, possible early SBO.  NGT now in place, continue drainage and GI consult.  Continue mechanical ventilation and sedation, serial CXR

## 2019-07-27 NOTE — CONSULT NOTE ADULT - PROBLEM SELECTOR RECOMMENDATION 2
Could represent early sepsis, patient aggressively hydrated and started on broad spectrum antibiotics for aspiration pneumonia.  Cultures sent and pending, trend lactate, temperature and leukocytosis.

## 2019-07-27 NOTE — PROGRESS NOTE ADULT - ASSESSMENT
79 yo M with PMHx of Alzheimer Dementia, gout, HTN brought in by wife from home for declining mental status and difficulty ambulating x 1 week.    new hypoxic acute respiratory failure broad ddx evolving aspiration pna in the setting of ams/toxic metabolic encephalopathy related to polypharmacy, new hcap, new pulm edema in setting of julieth r/o acs   full code   nebs stat   abg   steroids, iv   ekg 12 lead  cxr stat  recheck stat labs, and cycle cardiac enzymes   npo   aspiration precautions     high risk for morbidity, mortality secondary to the above mentioned medical conditions   vte proph   reviewed with rt, nursing and patient wife plan of care

## 2019-07-27 NOTE — PROGRESS NOTE ADULT - ASSESSMENT
Pt is a 78yr old man with PMhx as above now being managed for worsening mental status (metabolic encephalopathy vs worsening dementia), acute hypoxic respiratory failure requiring intubation in the setting of aspiration pneumonia, lower GIB, sinus tachycardia, OLVIN.    --Able to titrate fio2 to 30% with pulse ox 97%, continue to titrate as tolerated for pulse ox above 94%  --Continue NGT to suction, reported with 300mL coffee ground emesis, now with scant coffee ground output, h/h stable, GI consulted  --Will repeat cbc at 0000hr, transfuse prn for blood loss anemia  --Sinus tachycardia likely due to volume depletion, will bolus additional liter of IVF and reassess  --Strict I/Os  --Avoid nephrotoxins, renal dose medication  --Trend trop with EKG   --ECHO for baseline cardiac function   --Pt currently RASS -2, will titrate propofol off and transition to precedex for rass 0 - -1 to access mental status    f/u  --HR improved to low 100s s/p liter of IVF Pt is a 78yr old man with PMhx as above now being managed for worsening mental status (metabolic encephalopathy vs worsening dementia), acute hypoxic respiratory failure requiring intubation in the setting of aspiration pneumonia, lower GIB, sinus tachycardia, OLVIN.    --Able to titrate fio2 to 30% with pulse ox 97%, continue to titrate as tolerated for pulse ox above 94%  --Continue NGT to suction, reported with 300mL coffee ground emesis, now with scant coffee ground output, h/h stable, GI consulted  --Will repeat cbc at 0000hr, transfuse prn for blood loss anemia  --Sinus tachycardia likely due to volume depletion, will bolus additional liter of IVF and reassess  --Strict I/Os  --Avoid nephrotoxins, renal dose medication  --Trend trop with EKG   --ECHO for baseline cardiac function   --Pt currently RASS -2, will titrate propofol off and transition to precedex for rass 0 - -1 to access mental status    f/u  --HR improved to low 100s s/p liter of IVF    f/u  --SBP with acute drop from 120s to 80s x3 (though hr improved, now 80s), discussed with bedside RN, to start levophed, send cbc now. Pt is a 78yr old man with PMhx as above now being managed for worsening mental status (metabolic encephalopathy vs worsening dementia), acute hypoxic respiratory failure requiring intubation in the setting of aspiration pneumonia, lower GIB, sinus tachycardia, OLVIN.    --Able to titrate fio2 to 30% with pulse ox 97%, continue to titrate as tolerated for pulse ox above 94%  --Continue NGT to suction, reported with 300mL coffee ground emesis, now with scant coffee ground output, h/h stable, GI consulted  --Will repeat cbc at 0000hr, transfuse prn for blood loss anemia  --Sinus tachycardia likely due to volume depletion, will bolus additional liter of IVF and reassess  --Strict I/Os  --Avoid nephrotoxins, renal dose medication  --Trend trop with EKG   --ECHO for baseline cardiac function   --Pt currently RASS -2, will titrate propofol off and transition to precedex for rass 0 - -1 to access mental status    f/u  --HR improved to low 100s s/p liter of IVF    f/u  --SBP with acute drop from 120s to 80s x3 (though hr improved, now 80s), discussed with bedside RN, to start levophed for shock, send cbc now.

## 2019-07-27 NOTE — AIRWAY PLACEMENT NOTE ADULT - POST AIRWAY PLACEMENT ASSESSMENT:
chest excursion noted/positive end tidal CO2 noted/skin color improved/breath sounds bilateral/CXR pending

## 2019-07-27 NOTE — PROGRESS NOTE ADULT - SUBJECTIVE AND OBJECTIVE BOX
Pt is a     24hr events: Transferred to the ICU s/p RRT for acute respiratory failure requiring intubation     ICU Vital Signs Last 24 Hrs  T(C): 37.7 (2019 16:00), Max: 37.7 (2019 16:00)  T(F): 99.9 (2019 16:00), Max: 99.9 (2019 16:00)  HR: 106 (2019 18:00) (105 - 140)  BP: 91/53 (2019 18:00) (75/55 - 134/68)  BP(mean): 66 (2019 18:00) (63 - 103)  ABP: --  ABP(mean): --  RR: 21 (2019 18:00) (15 - 26)  SpO2: 98% (2019 18:00) (92% - 100%)    CARDIAC MARKERS ( 2019 18:25 )  .058 ng/mL / x     / x     / x     / x      CARDIAC MARKERS ( 2019 14:50 )  .053 ng/mL / x     / x     / x     / x      CARDIAC MARKERS ( 2019 10:50 )  <.017 ng/mL / x     / x     / x     / x            CBC Full  -  ( 2019 18:25 )  WBC Count : 14.80 K/uL  RBC Count : 3.52 M/uL  Hemoglobin : 10.4 g/dL  Hematocrit : 31.4 %  Platelet Count - Automated : 204 K/uL  Mean Cell Volume : 89.2 fl  Mean Cell Hemoglobin : 29.5 pg  Mean Cell Hemoglobin Concentration : 33.1 gm/dL  Auto Neutrophil # : x  Auto Lymphocyte # : x  Auto Monocyte # : x  Auto Eosinophil # : x  Auto Basophil # : x  Auto Neutrophil % : x  Auto Lymphocyte % : x  Auto Monocyte % : x  Auto Eosinophil % : x  Auto Basophil % : x      Urinalysis Basic - ( 2019 12:13 )    Color: Yellow / Appearance: Clear / S.020 / pH: x  Gluc: x / Ketone: Negative  / Bili: Negative / Urobili: Negative   Blood: x / Protein: 15 / Nitrite: Negative   Leuk Esterase: Negative / RBC: 5-10 /HPF / WBC 0-2 /HPF   Sq Epi: x / Non Sq Epi: Neg.-Few / Bacteria: Few /HPF      -    140  |  105  |  70<H>  ----------------------------<  137<H>  3.7   |  25  |  3.27<H>    Ca    8.6      2019 18:25  Mg     2.0         TPro  5.9<L>  /  Alb  2.2<L>  /  TBili  0.7  /  DBili  x   /  AST  20  /  ALT  15  /  AlkPhos  48      ABG - ( 2019 12:52 )  pH, Arterial: 7.40  pH, Blood: x     /  pCO2: 34    /  pO2: 84    / HCO3: x     / Base Excess: x     /  SaO2: 95                Mode: AC/ CMV (Assist Control/ Continuous Mandatory Ventilation)  RR (machine): 12  TV (machine): 550  FiO2: 40  PEEP: 5  PS: 5  PIP: 20    CULTURES:    Culture - Urine (collected 2019 12:30)  Source: .Urine Clean Catch (Midstream)  Final Report (2019 12:45):    No growth    Culture - Blood (collected 2019 11:20)  Source: .Blood Blood-Peripheral  Preliminary Report (2019 16:01):    No growth to date.    Culture - Blood (collected 2019 11:20)  Source: .Blood Blood-Peripheral  Preliminary Report (2019 16:01):    No growth to date.      I&O's Summary    2019 07:  -  2019 07:00  --------------------------------------------------------  IN: 480 mL / OUT: 0 mL / NET: 480 mL    2019 07:  -  2019 19:10  --------------------------------------------------------  IN: 2147.4 mL / OUT: 4615 mL / NET: -2467.6 mL      MEDICATIONS  (STANDING):  chlorhexidine 0.12% Liquid 15 milliLiter(s) Oral Mucosa every 12 hours  chlorhexidine 4% Liquid 1 Application(s) Topical <User Schedule>  dextrose 5%. 1000 milliLiter(s) (50 mL/Hr) IV Continuous <Continuous>  dextrose 50% Injectable 12.5 Gram(s) IV Push once  dextrose 50% Injectable 25 Gram(s) IV Push once  dextrose 50% Injectable 25 Gram(s) IV Push once  insulin lispro (HumaLOG) corrective regimen sliding scale   SubCutaneous every 6 hours  lactated ringers. 1000 milliLiter(s) (125 mL/Hr) IV Continuous <Continuous>  norepinephrine Infusion 0.05 MICROgram(s)/kG/Min (9.206 mL/Hr) IV Continuous <Continuous>  pantoprazole  Injectable 40 milliGRAM(s) IV Push two times a day  piperacillin/tazobactam IVPB.. 3.375 Gram(s) IV Intermittent every 8 hours  propofol Infusion 5 MICROgram(s)/kG/Min (2.946 mL/Hr) IV Continuous <Continuous>    MEDICATIONS  (PRN):  acetaminophen   Tablet .. 650 milliGRAM(s) Oral every 6 hours PRN Mild Pain (1 - 3)  dextrose 40% Gel 15 Gram(s) Oral once PRN Blood Glucose LESS THAN 70 milliGRAM(s)/deciliter  glucagon  Injectable 1 milliGRAM(s) IntraMuscular once PRN Glucose LESS THAN 70 milligrams/deciliter  ondansetron Injectable 4 milliGRAM(s) IV Push every 6 hours PRN Nausea and/or Vomiting  sodium chloride 0.9% lock flush 10 milliLiter(s) IV Push every 1 hour PRN Pre/post blood products, medications, blood draw, and to maintain line patency    No Known Allergies    FAMILY HISTORY:  Family history of dementia: Mother  FHx: diabetes mellitus    PAST MEDICAL & SURGICAL HISTORY:  Gout  Hypertension  Dementia  H/O inguinal hernia repair    RADIOLOGY/IMAGING/ECHO    Social History:   ROS:    Physical Examination:  General:   Neuro:  HEENT:   PULM:   CVS:   ABD:   EXT:   SKIN:     Neuro:  Pulm:  Cardiac:  ID:  GI:  :  Endo:  Prophylaxis: Pt is a 78yr old man with PMhx including HTN, dementia and gout who presents to the ER on  with wife with chief complaint of declining mental status       24hr events: Transferred to the ICU s/p RRT for acute respiratory failure requiring intubation     ICU Vital Signs Last 24 Hrs  T(C): 37.7 (2019 16:00), Max: 37.7 (2019 16:00)  T(F): 99.9 (2019 16:00), Max: 99.9 (2019 16:00)  HR: 106 (2019 18:00) (105 - 140)  BP: 91/53 (2019 18:00) (75/55 - 134/68)  BP(mean): 66 (2019 18:00) (63 - 103)  ABP: --  ABP(mean): --  RR: 21 (2019 18:00) (15 - 26)  SpO2: 98% (2019 18:00) (92% - 100%)    CARDIAC MARKERS ( 2019 18:25 )  .058 ng/mL / x     / x     / x     / x      CARDIAC MARKERS ( 2019 14:50 )  .053 ng/mL / x     / x     / x     / x      CARDIAC MARKERS ( 2019 10:50 )  <.017 ng/mL / x     / x     / x     / x            CBC Full  -  ( 2019 18:25 )  WBC Count : 14.80 K/uL  RBC Count : 3.52 M/uL  Hemoglobin : 10.4 g/dL  Hematocrit : 31.4 %  Platelet Count - Automated : 204 K/uL  Mean Cell Volume : 89.2 fl  Mean Cell Hemoglobin : 29.5 pg  Mean Cell Hemoglobin Concentration : 33.1 gm/dL  Auto Neutrophil # : x  Auto Lymphocyte # : x  Auto Monocyte # : x  Auto Eosinophil # : x  Auto Basophil # : x  Auto Neutrophil % : x  Auto Lymphocyte % : x  Auto Monocyte % : x  Auto Eosinophil % : x  Auto Basophil % : x      Urinalysis Basic - ( 2019 12:13 )    Color: Yellow / Appearance: Clear / S.020 / pH: x  Gluc: x / Ketone: Negative  / Bili: Negative / Urobili: Negative   Blood: x / Protein: 15 / Nitrite: Negative   Leuk Esterase: Negative / RBC: 5-10 /HPF / WBC 0-2 /HPF   Sq Epi: x / Non Sq Epi: Neg.-Few / Bacteria: Few /HPF          140  |  105  |  70<H>  ----------------------------<  137<H>  3.7   |  25  |  3.27<H>    Ca    8.6      2019 18:25  Mg     2.0         TPro  5.9<L>  /  Alb  2.2<L>  /  TBili  0.7  /  DBili  x   /  AST  20  /  ALT  15  /  AlkPhos  48      ABG - ( 2019 12:52 )  pH, Arterial: 7.40  pH, Blood: x     /  pCO2: 34    /  pO2: 84    / HCO3: x     / Base Excess: x     /  SaO2: 95                Mode: AC/ CMV (Assist Control/ Continuous Mandatory Ventilation)  RR (machine): 12  TV (machine): 550  FiO2: 40  PEEP: 5  PS: 5  PIP: 20    CULTURES:    Culture - Urine (collected 2019 12:30)  Source: .Urine Clean Catch (Midstream)  Final Report (2019 12:45):    No growth    Culture - Blood (collected 2019 11:20)  Source: .Blood Blood-Peripheral  Preliminary Report (2019 16:01):    No growth to date.    Culture - Blood (collected 2019 11:20)  Source: .Blood Blood-Peripheral  Preliminary Report (2019 16:01):    No growth to date.      I&O's Summary    2019 07:  -  2019 07:00  --------------------------------------------------------  IN: 480 mL / OUT: 0 mL / NET: 480 mL    2019 07:  -  2019 19:10  --------------------------------------------------------  IN: 2147.4 mL / OUT: 4615 mL / NET: -2467.6 mL      MEDICATIONS  (STANDING):  chlorhexidine 0.12% Liquid 15 milliLiter(s) Oral Mucosa every 12 hours  chlorhexidine 4% Liquid 1 Application(s) Topical <User Schedule>  dextrose 5%. 1000 milliLiter(s) (50 mL/Hr) IV Continuous <Continuous>  dextrose 50% Injectable 12.5 Gram(s) IV Push once  dextrose 50% Injectable 25 Gram(s) IV Push once  dextrose 50% Injectable 25 Gram(s) IV Push once  insulin lispro (HumaLOG) corrective regimen sliding scale   SubCutaneous every 6 hours  lactated ringers. 1000 milliLiter(s) (125 mL/Hr) IV Continuous <Continuous>  norepinephrine Infusion 0.05 MICROgram(s)/kG/Min (9.206 mL/Hr) IV Continuous <Continuous>  pantoprazole  Injectable 40 milliGRAM(s) IV Push two times a day  piperacillin/tazobactam IVPB.. 3.375 Gram(s) IV Intermittent every 8 hours  propofol Infusion 5 MICROgram(s)/kG/Min (2.946 mL/Hr) IV Continuous <Continuous>    MEDICATIONS  (PRN):  acetaminophen   Tablet .. 650 milliGRAM(s) Oral every 6 hours PRN Mild Pain (1 - 3)  dextrose 40% Gel 15 Gram(s) Oral once PRN Blood Glucose LESS THAN 70 milliGRAM(s)/deciliter  glucagon  Injectable 1 milliGRAM(s) IntraMuscular once PRN Glucose LESS THAN 70 milligrams/deciliter  ondansetron Injectable 4 milliGRAM(s) IV Push every 6 hours PRN Nausea and/or Vomiting  sodium chloride 0.9% lock flush 10 milliLiter(s) IV Push every 1 hour PRN Pre/post blood products, medications, blood draw, and to maintain line patency    No Known Allergies    FAMILY HISTORY:  Family history of dementia: Mother  FHx: diabetes mellitus    PAST MEDICAL & SURGICAL HISTORY:  Gout  Hypertension  Dementia  H/O inguinal hernia repair    RADIOLOGY/IMAGING/ECHO    Social History:   ROS:    Physical Examination:  General:   Neuro:  HEENT:   PULM:   CVS:   ABD:   EXT:   SKIN:     Neuro:  Pulm:  Cardiac:  ID:  GI:  :  Endo:  Prophylaxis: Pt is a 78yr old man with PMhx including HTN, dementia and gout who presents to the ER on  with wife with chief complaint of declining mental status  and was admitted for neuro work up.     24hr events: Transferred to the ICU s/p RRT for acute respiratory failure requiring intubation     ICU Vital Signs Last 24 Hrs  T(C): 37.7 (2019 16:00), Max: 37.7 (2019 16:00)  T(F): 99.9 (2019 16:00), Max: 99.9 (2019 16:00)  HR: 106 (2019 18:00) (105 - 140)  BP: 91/53 (2019 18:00) (75/55 - 134/68)  BP(mean): 66 (2019 18:00) (63 - 103)  ABP: --  ABP(mean): --  RR: 21 (2019 18:00) (15 - 26)  SpO2: 98% (2019 18:00) (92% - 100%)    CARDIAC MARKERS ( 2019 18:25 )  .058 ng/mL / x     / x     / x     / x      CARDIAC MARKERS ( 2019 14:50 )  .053 ng/mL / x     / x     / x     / x      CARDIAC MARKERS ( 2019 10:50 )  <.017 ng/mL / x     / x     / x     / x        CBC Full  -  ( 2019 18:25 )  WBC Count : 14.80 K/uL  RBC Count : 3.52 M/uL  Hemoglobin : 10.4 g/dL  Hematocrit : 31.4 %  Platelet Count - Automated : 204 K/uL  Mean Cell Volume : 89.2 fl  Mean Cell Hemoglobin : 29.5 pg  Mean Cell Hemoglobin Concentration : 33.1 gm/dL  Auto Neutrophil # : x  Auto Lymphocyte # : x  Auto Monocyte # : x  Auto Eosinophil # : x  Auto Basophil # : x  Auto Neutrophil % : x  Auto Lymphocyte % : x  Auto Monocyte % : x  Auto Eosinophil % : x  Auto Basophil % : x    Urinalysis Basic - ( 2019 12:13 )    Color: Yellow / Appearance: Clear / S.020 / pH: x  Gluc: x / Ketone: Negative  / Bili: Negative / Urobili: Negative   Blood: x / Protein: 15 / Nitrite: Negative   Leuk Esterase: Negative / RBC: 5-10 /HPF / WBC 0-2 /HPF   Sq Epi: x / Non Sq Epi: Neg.-Few / Bacteria: Few /HPF          140  |  105  |  70<H>  ----------------------------<  137<H>  3.7   |  25  |  3.27<H>    Ca    8.6      2019 18:25  Mg     2.0         TPro  5.9<L>  /  Alb  2.2<L>  /  TBili  0.7  /  DBili  x   /  AST  20  /  ALT  15  /  AlkPhos  48      ABG - ( 2019 12:52 )  pH, Arterial: 7.40  pH, Blood: x     /  pCO2: 34    /  pO2: 84    / HCO3: x     / Base Excess: x     /  SaO2: 95                Mode: AC/ CMV (Assist Control/ Continuous Mandatory Ventilation)  RR (machine): 12  TV (machine): 550  FiO2: 40  PEEP: 5  PS: 5  PIP: 20    CULTURES:    Culture - Urine (collected 2019 12:30)  Source: .Urine Clean Catch (Midstream)  Final Report (2019 12:45):    No growth    Culture - Blood (collected 2019 11:20)  Source: .Blood Blood-Peripheral  Preliminary Report (2019 16:01):    No growth to date.    Culture - Blood (collected 2019 11:20)  Source: .Blood Blood-Peripheral  Preliminary Report (2019 16:01):    No growth to date.      I&O's Summary    :  -  2019 07:00  --------------------------------------------------------  IN: 480 mL / OUT: 0 mL / NET: 480 mL    2019 07:  -  2019 19:10  --------------------------------------------------------  IN: 2147.4 mL / OUT: 4615 mL / NET: -2467.6 mL      MEDICATIONS  (STANDING):  chlorhexidine 0.12% Liquid 15 milliLiter(s) Oral Mucosa every 12 hours  chlorhexidine 4% Liquid 1 Application(s) Topical <User Schedule>  dextrose 5%. 1000 milliLiter(s) (50 mL/Hr) IV Continuous <Continuous>  dextrose 50% Injectable 12.5 Gram(s) IV Push once  dextrose 50% Injectable 25 Gram(s) IV Push once  dextrose 50% Injectable 25 Gram(s) IV Push once  insulin lispro (HumaLOG) corrective regimen sliding scale   SubCutaneous every 6 hours  lactated ringers. 1000 milliLiter(s) (125 mL/Hr) IV Continuous <Continuous>  norepinephrine Infusion 0.05 MICROgram(s)/kG/Min (9.206 mL/Hr) IV Continuous <Continuous>  pantoprazole  Injectable 40 milliGRAM(s) IV Push two times a day  piperacillin/tazobactam IVPB.. 3.375 Gram(s) IV Intermittent every 8 hours  propofol Infusion 5 MICROgram(s)/kG/Min (2.946 mL/Hr) IV Continuous <Continuous>    MEDICATIONS  (PRN):  acetaminophen   Tablet .. 650 milliGRAM(s) Oral every 6 hours PRN Mild Pain (1 - 3)  dextrose 40% Gel 15 Gram(s) Oral once PRN Blood Glucose LESS THAN 70 milliGRAM(s)/deciliter  glucagon  Injectable 1 milliGRAM(s) IntraMuscular once PRN Glucose LESS THAN 70 milligrams/deciliter  ondansetron Injectable 4 milliGRAM(s) IV Push every 6 hours PRN Nausea and/or Vomiting  sodium chloride 0.9% lock flush 10 milliLiter(s) IV Push every 1 hour PRN Pre/post blood products, medications, blood draw, and to maintain line patency    No Known Allergies    FAMILY HISTORY:  Family history of dementia: Mother  FHx: diabetes mellitus    PAST MEDICAL & SURGICAL HISTORY:  Gout  Hypertension  Dementia  H/O inguinal hernia repair    RADIOLOGY/IMAGING/ECHO  < from: CT Head No Cont (19 @ 14:05) >  IMPRESSION: No acute intracranial hemorrhage or mass effect.     < end of copied text >  < from: Xray Chest 1 View- PORTABLE-Urgent (19 @ 12:24) >  FINDINGS/  IMPRESSION:  New ET tube noted tip just above the evelyne. Evelyne not well seen. New   right central line noted tip overlying the expected region of SVC.   Evaluation however limited due to rotation.    New feeding tube noted tip under the diaphragm overlying the expected   region of stomach.    No pneumothorax. Small left pleural effusion.    Heart size cannot be accurately assessed in this projection.    < end of copied text >    Social History: Unable to access due to clinical condition  ROS: Unable to assess due to clinical condition     Physical Examination:  General: Intubated, sedated  Neuro: RASS -2 on 10mcg/kg/min of propofol  HEENT: Bilateral pupillary constriction, pupils equal round and reactive to light, atraumatic  PULM: Received 12/550/40/5, RR 19, pulse ox 99%, diminished greater on right than left  CVS: s1s2 hr 120s sinus on telemetry   ABD: +bs, obese, softly distended  EXT: +pedal pulses, no BLE edema appreciated  SKIN: warm/dry

## 2019-07-27 NOTE — CONSULT NOTE ADULT - SUBJECTIVE AND OBJECTIVE BOX
INTERVAL HPI/OVERNIGHT EVENTS:  HPI:  77 y/o male pmh Alzheimer Dementia, gout, HTN who was brought in by wife due to decrease in mobility. Information obtained from records as patient is intubated in CCU. Seen and examined at bedside. Patient had episode of respiratory distress earlier today to which it was noted he had coffee ground emesis upon suctioning.  NGT was placed and over 1 L of dark coffee ground emesis was removed. No BRBPR or melena noted.     MEDICATIONS  (STANDING):  chlorhexidine 0.12% Liquid 15 milliLiter(s) Oral Mucosa every 12 hours  chlorhexidine 4% Liquid 1 Application(s) Topical <User Schedule>  dextrose 5%. 1000 milliLiter(s) (50 mL/Hr) IV Continuous <Continuous>  dextrose 50% Injectable 12.5 Gram(s) IV Push once  dextrose 50% Injectable 25 Gram(s) IV Push once  dextrose 50% Injectable 25 Gram(s) IV Push once  insulin lispro (HumaLOG) corrective regimen sliding scale   SubCutaneous every 6 hours  lactated ringers. 1000 milliLiter(s) (125 mL/Hr) IV Continuous <Continuous>  norepinephrine Infusion 0.05 MICROgram(s)/kG/Min (9.206 mL/Hr) IV Continuous <Continuous>  pantoprazole  Injectable 40 milliGRAM(s) IV Push two times a day  piperacillin/tazobactam IVPB.. 3.375 Gram(s) IV Intermittent every 8 hours  propofol Infusion 5 MICROgram(s)/kG/Min (2.946 mL/Hr) IV Continuous <Continuous>    MEDICATIONS  (PRN):  acetaminophen   Tablet .. 650 milliGRAM(s) Oral every 6 hours PRN Mild Pain (1 - 3)  dextrose 40% Gel 15 Gram(s) Oral once PRN Blood Glucose LESS THAN 70 milliGRAM(s)/deciliter  glucagon  Injectable 1 milliGRAM(s) IntraMuscular once PRN Glucose LESS THAN 70 milligrams/deciliter  ondansetron Injectable 4 milliGRAM(s) IV Push every 6 hours PRN Nausea and/or Vomiting  sodium chloride 0.9% lock flush 10 milliLiter(s) IV Push every 1 hour PRN Pre/post blood products, medications, blood draw, and to maintain line patency      Allergies    No Known Allergies    Intolerances        PHYSICAL EXAM:   Vital Signs:  Vital Signs Last 24 Hrs  T(C): 37.1 (2019 11:22), Max: 37.8 (2019 16:26)  T(F): 98.8 (2019 11:22), Max: 100.1 (2019 16:26)  HR: 108 (2019 14:30) (105 - 140)  BP: 118/66 (2019 14:30) (75/55 - 147/86)  BP(mean): 80 (2019 14:30) (63 - 89)  RR: 19 (2019 12:00) (15 - 24)  SpO2: 98% (2019 14:30) (92% - 100%)  Daily     Daily I&O's Summary    2019 07:  -  2019 07:00  --------------------------------------------------------  IN: 480 mL / OUT: 0 mL / NET: 480 mL    2019 07:01  -  2019 15:27  --------------------------------------------------------  IN: 1586.2 mL / OUT: 4100 mL / NET: -2513.8 mL    GENERAL:  Appears stated age  HEENT:  NC/AT,  conjunctivae clear and pink, intubated  CHEST:  Full & symmetric excursion, no increased effort, breath sounds clear  HEART:  Regular rhythm, S1, S2  ABDOMEN:  Softly distended, hypoactive bowel sounds,  EXTEREMITIES:  no edema  SKIN:  No rash, warm/dry  NEURO:  Sedated    LABS:                        12.7   22.58 )-----------( 280      ( 2019 10:50 )             38.6         139  |  103  |  69<H>  ----------------------------<  251<H>  4.7   |  21<L>  |  3.79<H>    Ca    9.3      2019 10:50  Mg     2.0             Urinalysis Basic - ( 2019 12:13 )    Color: Yellow / Appearance: Clear / S.020 / pH: x  Gluc: x / Ketone: Negative  / Bili: Negative / Urobili: Negative   Blood: x / Protein: 15 / Nitrite: Negative   Leuk Esterase: Negative / RBC: 5-10 /HPF / WBC 0-2 /HPF   Sq Epi: x / Non Sq Epi: Neg.-Few / Bacteria: Few /HPF

## 2019-07-27 NOTE — CONSULT NOTE ADULT - PROBLEM SELECTOR RECOMMENDATION 3
May be pre-renal from dehydration, place Marrero for strict intake and output monitoring.  Monitor electrolytes, consider renal consult

## 2019-07-27 NOTE — CONSULT NOTE ADULT - ASSESSMENT
78 year old male with PMH Alzheimer Dementia, gout, HTN admitted for change in mental status.  Developed acute OLVIN of uncertain etiology, now with likely aspiration event, possible GIB.

## 2019-07-27 NOTE — CONSULT NOTE ADULT - PROBLEM SELECTOR RECOMMENDATION 9
NPO  NGT -> LWS  CBC Q 6 hours  IV fluids   Transfuse as needed  Continue PPI IV BID  Will re eval patient in AM NPO  NGT -> LWS  CBC Q 6 hours  Type and Screen  Hold anticoags  IV fluids   Transfuse as needed  Continue PPI IV BID  Will re eval patient in AM

## 2019-07-27 NOTE — PROGRESS NOTE ADULT - SUBJECTIVE AND OBJECTIVE BOX
cc increased wheezing, wob increased fu     seen at bedside with nursing and RT team   wife at bedside as well     has new increased wob with audible wheezing and altered mentation that has been worsening over time     over last night per wife incrased confusion and no hismself     ros limited given the acute critical  illness   MEDICATIONS  (STANDING):  ALBUTerol/ipratropium for Nebulization 3 milliLiter(s) Nebulizer every 6 hours  docusate sodium 100 milliGRAM(s) Oral two times a day  heparin  Injectable 5000 Unit(s) SubCutaneous every 8 hours  lisinopril 40 milliGRAM(s) Oral daily  methylPREDNISolone sodium succinate Injectable 40 milliGRAM(s) IV Push every 6 hours  piperacillin/tazobactam IVPB. 3.375 Gram(s) IV Intermittent once  piperacillin/tazobactam IVPB..      polyethylene glycol 3350 17 Gram(s) Oral daily  senna 2 Tablet(s) Oral at bedtime  simethicone 80 milliGRAM(s) Chew daily    Vital Signs Last 24 Hrs  T(C): 36.7 (2019 05:37), Max: 37.8 (2019 16:26)  T(F): 98.1 (2019 05:37), Max: 100.1 (2019 16:26)  HR: 120 (2019 05:37) (120 - 121)  BP: 111/71 (2019 05:37) (111/71 - 147/86)  BP(mean): --  RR: 16 (2019 05:37) (15 - 16)  SpO2: 92% (2019 05:37) (92% - 96%)vancomycin  IVPB 1000 milliGRAM(s) IV Intermittent once          PHYSICAL EXAM:    General: NAD, altered mental status using neck accessory muscles  ENT: MMM  Neck: Supple, No JVD  Lungs: wheezing bi   Cardio: RRR, S1/S2, No murmurs  Abdomen: ++distention, NT to palp; Bowel sounds present  Extremities:  no le edema   Neuro:  pt unable to follow commands    LABS:                        14.3   8.80  )-----------( 195      ( 2019 11:20 )             43.6     07-26    137  |  102  |  14  ----------------------------<  168  4.3   |  20  |  1.43    Ca    10.1      2019 08:10  Mg     2.0         TPro  8.0  /  Alb  3.3  /  TBili  1.3  /  DBili  x   /  AST  20  /  ALT  25  /  AlkPhos  68      eGFR if : 55 mL/min/1.73M2 (19 @ 08:10)  eGFR if Non African American: 47 mL/min/1.73M2 (19 @ 08:10)    PT/INR - ( 2019 11:20 )   PT: 13.4 sec;   INR: 1.19 ratio         PTT - ( 2019 11:20 )  PTT:32.8 sec  Lactate, Blood: 1.3 mmol/L ( @ 11:20)      POCT Blood Glucose.: 140 mg/dL (2019 11:11)      Urinalysis Basic - ( 2019 12:30 )    Color: Yellow / Appearance: Clear / S.020 / pH: x  Gluc: x / Ketone: Negative  / Bili: Negative / Urobili: Negative   Blood: x / Protein: 15 / Nitrite: Negative   Leuk Esterase: Negative / RBC: 5-10 /HPF / WBC Negative /HPF   Sq Epi: x / Non Sq Epi: Neg.-Few / Bacteria: Trace /HPF          RADIOLOGY & ADDITIONAL TESTS:    Care Discussed with Consultants/Other Providers:

## 2019-07-27 NOTE — PROCEDURE NOTE - NSPROCDETAILS_GEN_ALL_CORE
ultrasound assessment/ultrasound guidance/lumen(s) aspirated and flushed/sterile dressing applied/sterile technique, catheter placed/guidewire recovered

## 2019-07-28 ENCOUNTER — TRANSCRIPTION ENCOUNTER (OUTPATIENT)
Age: 79
End: 2019-07-28

## 2019-07-28 DIAGNOSIS — A41.9 SEPSIS, UNSPECIFIED ORGANISM: ICD-10-CM

## 2019-07-28 DIAGNOSIS — J96.01 ACUTE RESPIRATORY FAILURE WITH HYPOXIA: ICD-10-CM

## 2019-07-28 DIAGNOSIS — J69.0 PNEUMONITIS DUE TO INHALATION OF FOOD AND VOMIT: ICD-10-CM

## 2019-07-28 LAB
-  STREPTOCOCCUS SP. (NOT GRP A, B OR S PNEUMONIAE): SIGNIFICANT CHANGE UP
ANION GAP SERPL CALC-SCNC: 9 MMOL/L — SIGNIFICANT CHANGE UP (ref 5–17)
ANION GAP SERPL CALC-SCNC: 9 MMOL/L — SIGNIFICANT CHANGE UP (ref 5–17)
BUN SERPL-MCNC: 56 MG/DL — HIGH (ref 7–23)
BUN SERPL-MCNC: 64 MG/DL — HIGH (ref 7–23)
CALCIUM SERPL-MCNC: 8.3 MG/DL — LOW (ref 8.4–10.5)
CALCIUM SERPL-MCNC: 8.5 MG/DL — SIGNIFICANT CHANGE UP (ref 8.4–10.5)
CHLORIDE SERPL-SCNC: 107 MMOL/L — SIGNIFICANT CHANGE UP (ref 96–108)
CHLORIDE SERPL-SCNC: 108 MMOL/L — SIGNIFICANT CHANGE UP (ref 96–108)
CO2 SERPL-SCNC: 25 MMOL/L — SIGNIFICANT CHANGE UP (ref 22–31)
CO2 SERPL-SCNC: 25 MMOL/L — SIGNIFICANT CHANGE UP (ref 22–31)
CREAT SERPL-MCNC: 2.07 MG/DL — HIGH (ref 0.5–1.3)
CREAT SERPL-MCNC: 2.5 MG/DL — HIGH (ref 0.5–1.3)
CULTURE RESULTS: NO GROWTH — SIGNIFICANT CHANGE UP
GLUCOSE BLDC GLUCOMTR-MCNC: 124 MG/DL — HIGH (ref 70–99)
GLUCOSE BLDC GLUCOMTR-MCNC: 133 MG/DL — HIGH (ref 70–99)
GLUCOSE BLDC GLUCOMTR-MCNC: 142 MG/DL — HIGH (ref 70–99)
GLUCOSE BLDC GLUCOMTR-MCNC: 149 MG/DL — HIGH (ref 70–99)
GLUCOSE SERPL-MCNC: 158 MG/DL — HIGH (ref 70–99)
GLUCOSE SERPL-MCNC: 161 MG/DL — HIGH (ref 70–99)
GRAM STN FLD: SIGNIFICANT CHANGE UP
HBA1C BLD-MCNC: 5.7 % — HIGH (ref 4–5.6)
HCT VFR BLD CALC: 28.1 % — LOW (ref 39–50)
HCT VFR BLD CALC: 28.6 % — LOW (ref 39–50)
HGB BLD-MCNC: 9.1 G/DL — LOW (ref 13–17)
HGB BLD-MCNC: 9.4 G/DL — LOW (ref 13–17)
MAGNESIUM SERPL-MCNC: 1.8 MG/DL — SIGNIFICANT CHANGE UP (ref 1.6–2.6)
MAGNESIUM SERPL-MCNC: 2.1 MG/DL — SIGNIFICANT CHANGE UP (ref 1.6–2.6)
MCHC RBC-ENTMCNC: 29.2 PG — SIGNIFICANT CHANGE UP (ref 27–34)
MCHC RBC-ENTMCNC: 29.3 PG — SIGNIFICANT CHANGE UP (ref 27–34)
MCHC RBC-ENTMCNC: 32.4 GM/DL — SIGNIFICANT CHANGE UP (ref 32–36)
MCHC RBC-ENTMCNC: 32.9 GM/DL — SIGNIFICANT CHANGE UP (ref 32–36)
MCV RBC AUTO: 89.1 FL — SIGNIFICANT CHANGE UP (ref 80–100)
MCV RBC AUTO: 90.1 FL — SIGNIFICANT CHANGE UP (ref 80–100)
METHOD TYPE: SIGNIFICANT CHANGE UP
NRBC # BLD: 0 /100 WBCS — SIGNIFICANT CHANGE UP (ref 0–0)
NRBC # BLD: 0 /100 WBCS — SIGNIFICANT CHANGE UP (ref 0–0)
PHOSPHATE SERPL-MCNC: 3.6 MG/DL — SIGNIFICANT CHANGE UP (ref 2.5–4.5)
PHOSPHATE SERPL-MCNC: 3.7 MG/DL — SIGNIFICANT CHANGE UP (ref 2.5–4.5)
PLATELET # BLD AUTO: 156 K/UL — SIGNIFICANT CHANGE UP (ref 150–400)
PLATELET # BLD AUTO: 177 K/UL — SIGNIFICANT CHANGE UP (ref 150–400)
POTASSIUM SERPL-MCNC: 3.6 MMOL/L — SIGNIFICANT CHANGE UP (ref 3.5–5.3)
POTASSIUM SERPL-MCNC: 3.6 MMOL/L — SIGNIFICANT CHANGE UP (ref 3.5–5.3)
POTASSIUM SERPL-SCNC: 3.6 MMOL/L — SIGNIFICANT CHANGE UP (ref 3.5–5.3)
POTASSIUM SERPL-SCNC: 3.6 MMOL/L — SIGNIFICANT CHANGE UP (ref 3.5–5.3)
RBC # BLD: 3.12 M/UL — LOW (ref 4.2–5.8)
RBC # BLD: 3.21 M/UL — LOW (ref 4.2–5.8)
RBC # FLD: 13.3 % — SIGNIFICANT CHANGE UP (ref 10.3–14.5)
RBC # FLD: 13.3 % — SIGNIFICANT CHANGE UP (ref 10.3–14.5)
SODIUM SERPL-SCNC: 141 MMOL/L — SIGNIFICANT CHANGE UP (ref 135–145)
SODIUM SERPL-SCNC: 142 MMOL/L — SIGNIFICANT CHANGE UP (ref 135–145)
SPECIMEN SOURCE: SIGNIFICANT CHANGE UP
TROPONIN I SERPL-MCNC: 0.03 NG/ML — SIGNIFICANT CHANGE UP (ref 0.02–0.06)
WBC # BLD: 8.04 K/UL — SIGNIFICANT CHANGE UP (ref 3.8–10.5)
WBC # BLD: 9.76 K/UL — SIGNIFICANT CHANGE UP (ref 3.8–10.5)
WBC # FLD AUTO: 8.04 K/UL — SIGNIFICANT CHANGE UP (ref 3.8–10.5)
WBC # FLD AUTO: 9.76 K/UL — SIGNIFICANT CHANGE UP (ref 3.8–10.5)

## 2019-07-28 PROCEDURE — 99232 SBSQ HOSP IP/OBS MODERATE 35: CPT

## 2019-07-28 PROCEDURE — 93306 TTE W/DOPPLER COMPLETE: CPT | Mod: 26

## 2019-07-28 PROCEDURE — 71045 X-RAY EXAM CHEST 1 VIEW: CPT | Mod: 26

## 2019-07-28 RX ORDER — VANCOMYCIN HCL 1 G
1000 VIAL (EA) INTRAVENOUS ONCE
Refills: 0 | Status: COMPLETED | OUTPATIENT
Start: 2019-07-28 | End: 2019-07-28

## 2019-07-28 RX ORDER — MAGNESIUM SULFATE 500 MG/ML
1 VIAL (ML) INJECTION ONCE
Refills: 0 | Status: COMPLETED | OUTPATIENT
Start: 2019-07-28 | End: 2019-07-28

## 2019-07-28 RX ORDER — SODIUM CHLORIDE 9 MG/ML
1000 INJECTION, SOLUTION INTRAVENOUS
Refills: 0 | Status: DISCONTINUED | OUTPATIENT
Start: 2019-07-28 | End: 2019-07-29

## 2019-07-28 RX ORDER — FENTANYL CITRATE 50 UG/ML
50 INJECTION INTRAVENOUS ONCE
Refills: 0 | Status: DISCONTINUED | OUTPATIENT
Start: 2019-07-28 | End: 2019-07-28

## 2019-07-28 RX ORDER — LIDOCAINE HCL 20 MG/ML
5 VIAL (ML) INJECTION ONCE
Refills: 0 | Status: DISCONTINUED | OUTPATIENT
Start: 2019-07-28 | End: 2019-07-28

## 2019-07-28 RX ADMIN — Medication 250 MILLIGRAM(S): at 09:36

## 2019-07-28 RX ADMIN — FENTANYL CITRATE 50 MICROGRAM(S): 50 INJECTION INTRAVENOUS at 22:24

## 2019-07-28 RX ADMIN — CHLORHEXIDINE GLUCONATE 15 MILLILITER(S): 213 SOLUTION TOPICAL at 06:18

## 2019-07-28 RX ADMIN — PANTOPRAZOLE SODIUM 40 MILLIGRAM(S): 20 TABLET, DELAYED RELEASE ORAL at 17:18

## 2019-07-28 RX ADMIN — PIPERACILLIN AND TAZOBACTAM 25 GRAM(S): 4; .5 INJECTION, POWDER, LYOPHILIZED, FOR SOLUTION INTRAVENOUS at 13:34

## 2019-07-28 RX ADMIN — Medication 650 MILLIGRAM(S): at 20:14

## 2019-07-28 RX ADMIN — Medication 100 GRAM(S): at 09:36

## 2019-07-28 RX ADMIN — PANTOPRAZOLE SODIUM 40 MILLIGRAM(S): 20 TABLET, DELAYED RELEASE ORAL at 06:18

## 2019-07-28 RX ADMIN — CHLORHEXIDINE GLUCONATE 1 APPLICATION(S): 213 SOLUTION TOPICAL at 06:18

## 2019-07-28 RX ADMIN — FENTANYL CITRATE 50 MICROGRAM(S): 50 INJECTION INTRAVENOUS at 22:45

## 2019-07-28 RX ADMIN — DEXMEDETOMIDINE HYDROCHLORIDE IN 0.9% SODIUM CHLORIDE 4.91 MICROGRAM(S)/KG/HR: 4 INJECTION INTRAVENOUS at 17:17

## 2019-07-28 RX ADMIN — DEXMEDETOMIDINE HYDROCHLORIDE IN 0.9% SODIUM CHLORIDE 4.91 MICROGRAM(S)/KG/HR: 4 INJECTION INTRAVENOUS at 09:36

## 2019-07-28 RX ADMIN — DEXMEDETOMIDINE HYDROCHLORIDE IN 0.9% SODIUM CHLORIDE 4.91 MICROGRAM(S)/KG/HR: 4 INJECTION INTRAVENOUS at 21:38

## 2019-07-28 RX ADMIN — PIPERACILLIN AND TAZOBACTAM 25 GRAM(S): 4; .5 INJECTION, POWDER, LYOPHILIZED, FOR SOLUTION INTRAVENOUS at 21:12

## 2019-07-28 RX ADMIN — PIPERACILLIN AND TAZOBACTAM 25 GRAM(S): 4; .5 INJECTION, POWDER, LYOPHILIZED, FOR SOLUTION INTRAVENOUS at 06:24

## 2019-07-28 RX ADMIN — SODIUM CHLORIDE 125 MILLILITER(S): 9 INJECTION, SOLUTION INTRAVENOUS at 13:34

## 2019-07-28 RX ADMIN — Medication 650 MILLIGRAM(S): at 20:23

## 2019-07-28 RX ADMIN — CHLORHEXIDINE GLUCONATE 15 MILLILITER(S): 213 SOLUTION TOPICAL at 17:18

## 2019-07-28 NOTE — PROGRESS NOTE ADULT - PROBLEM SELECTOR PLAN 4
Hold nephrotoxic meds  Continue aggressive hydration  Trend urine output  Follow up BUN/Creatinine  follow up electrolytes
-secondary to dehydration; hoping to restart IVF; pt has pulled IV lines out  -follow bmp
-secondary to dehydration; hoping to restart IVF; pt has pulled IV lines out  -follow bmp

## 2019-07-28 NOTE — DIETITIAN INITIAL EVALUATION ADULT. - ADD RECOMMEND
Recommend initiate nutritional provision via tolerated route within 3 days/per medical team. Trend weights/labs.

## 2019-07-28 NOTE — PROGRESS NOTE ADULT - ASSESSMENT
Physical Exam  Gen:  sedated, not in distress  ENT:  NC/AT, no JVD noted.  RIJ TLC with dressing C/D/I.  NGT in place with dark brown drainage   Thorax:  Symmetric, no retractions  Lung:  Coarse breath sounds, thick secretions upon suctioning   CV:  S1, S2. no murmurs   Abd:  Softly distended NT to palp.  BS normoactive, no masses to palp  Extrem:  No C/C/E, DP/radial pulses +2  Neuro:  Moves all extremities, does not follow commands  Skin: Diaphoretic    78 year old male with PMH Alzheimer Dementia, HTN admitted to medical service. RRT called 7/27 for respiratory distress. At time of evaluation noted with audible gurgling, tachycardic with hypoxia. Appears patient may have aspirated. NGT placed with ~300 cc, of dark colored vomitus obtained. Decision made to intubate the patient for airway protection. Labs obtained, appears he is hypovolemic probably due to the vomiting. Lactate level is elevated, possible developing sepsis due to aspiration event, but likely as he is dehydrated.     Assessment:  1. Acute respiratory failure  2. Aspiration Pneumonitis   3. Acute renal failure  4. Hypotension   5. Sepsis  6. Emesis   7. Acute blood loss anemia   8. Alzheimer's disease   9. Lactic acidosis  10. Gram positive bacteremia     - Mechanical ventilation with lung protective strategy   - Sedated for ventilator synchrony   - F/u Blood, sputum cultures. UA negative.   - Broad spectrum antibiotics   - ID consult  - Check Echo  - Marrero placed to monitor urine output  - Renal function improving   - Lactate normalized   - Hold all antihypertensives  - Vasopressor support to maintain MAP > 65  - Hold all PO medications for now   - GI consulted appreciated  - Trend Hgb   - PPI IV BID   - SCD for DVT prophylaxis .

## 2019-07-28 NOTE — PROGRESS NOTE ADULT - SUBJECTIVE AND OBJECTIVE BOX
78y  Male  No Known Allergies    Patient is a 78y old  Male who presents with a chief complaint of Worsening agitation and difficulty ambulating (2019 12:09)    HPI:  77 yo M with PMHx of Alzheimer Dementia, gout, HTN brought in by wife from home for declining mental status and difficulty ambulating x 1 week, decreased PO intake and worsening agitation. He was admitted but was a rapid response on the floor for respiratory distress, was found not managing his airway  with audible gurgling, tachycardic, hypoxia and subsequently intubated for respiratory failure jayde to apparent aspiration.     an ng tube was placed with 300 cc of dark colored vomitus obtained. He presented with elevated lactate level secondary to sepsis due from aspiration. He required IV pressors for hypotension for brief period last night but has been sustaining good MAP's off levophed for today     PAST MEDICAL & SURGICAL HISTORY:  Gout  Hypertension  Dementia  H/O inguinal hernia repair    FAMILY HISTORY:  Family history of dementia: Mother  FHx: diabetes mellitus    Vital Signs Last 24 Hrs  T(C): 37.1 (2019 15:00), Max: 37.2 (2019 21:00)  T(F): 98.8 (2019 15:00), Max: 99 (2019 21:00)  HR: 71 (2019 19:00) (62 - 102)  BP: 119/58 (2019 19:00) (88/52 - 207/180)  BP(mean): 77 (2019 19:00) (64 - 191)  RR: 16 (2019 19:00) (11 - 25)  SpO2: 94% (2019 19:00) (94% - 100%)  ABG - ( 2019 12:52 )  pH, Arterial: 7.40  pH, Blood: x     /  pCO2: 34    /  pO2: 84    / HCO3: x     / Base Excess: x     /  SaO2: 95          I&O's Summary  2019 07:01  -  2019 07:00  --------------------------------------------------------  IN: 4932.7 mL / OUT: 5830 mL / NET: -897.3 mL    2019 07:01  -  2019 20:30  --------------------------------------------------------  IN: 1533.8 mL / OUT: 1940 mL / NET: -406.2 mL    LABS      141  |  107  |  56<H>  ----------------------------<  158<H>  3.6   |  25  |  2.07<H>    Ca    8.3<L>      2019 12:30  Phos  3.6       Mg     2.1       TPro  5.9<L>  /  Alb  2.2<L>  /  TBili  0.7  /  DBili  x   /  AST  20  /  ALT  15  /  AlkPhos  48                         9.1    8.04  )-----------( 156      ( 2019 12:30 )             28.1     CARDIAC MARKERS ( 2019 05:00 )  .027 ng/mL / x     / x     / x     / x      CARDIAC MARKERS ( 2019 23:00 )  .050 ng/mL / x     / 236 U/L / x     / 4.0 ng/mL  CARDIAC MARKERS ( 2019 18:25 )  .058 ng/mL / x     / x     / x     / x      CARDIAC MARKERS ( 2019 14:50 )  .053 ng/mL / x     / x     / x     / x      CARDIAC MARKERS ( 2019 10:50 )  <.017 ng/mL / x     / x     / x     / x        LIVER FUNCTIONS - ( 2019 18:25 )  Alb: 2.2 g/dL / Pro: 5.9 g/dL / ALK PHOS: 48 U/L / ALT: 15 U/L DA / AST: 20 U/L / GGT: x           CAPILLARY BLOOD GLUCOSE  POCT Blood Glucose.: 124 mg/dL (2019 16:50)    Urinalysis Basic - ( 2019 12:13 )  Color: Yellow / Appearance: Clear / S.020 / pH: x  Gluc: x / Ketone: Negative  / Bili: Negative / Urobili: Negative   Blood: x / Protein: 15 / Nitrite: Negative   Leuk Esterase: Negative / RBC: 5-10 /HPF / WBC 0-2 /HPF   Sq Epi: x / Non Sq Epi: Neg.-Few / Bacteria: Few /HPF    VENT SETTINGS   Mode: AC/ CMV (Assist Control/ Continuous Mandatory Ventilation)  RR (machine): 12  TV (machine): 500  FiO2: 30  PEEP: 5  PS: 5  MAP: 9.5  PIP: 21    MEDICATIONS  (STANDING):  chlorhexidine 0.12% Liquid 15 milliLiter(s) Oral Mucosa every 12 hours  chlorhexidine 4% Liquid 1 Application(s) Topical <User Schedule>  dexmedetomidine Infusion 0.2 MICROgram(s)/kG/Hr (4.91 mL/Hr) IV Continuous <Continuous>  dextrose 5%. 1000 milliLiter(s) (50 mL/Hr) IV Continuous <Continuous>  dextrose 50% Injectable 12.5 Gram(s) IV Push once  dextrose 50% Injectable 25 Gram(s) IV Push once  dextrose 50% Injectable 25 Gram(s) IV Push once  insulin lispro (HumaLOG) corrective regimen sliding scale   SubCutaneous every 6 hours  lactated ringers. 1000 milliLiter(s) (125 mL/Hr) IV Continuous <Continuous>  norepinephrine Infusion 0.05 MICROgram(s)/kG/Min (9.206 mL/Hr) IV Continuous <Continuous>  pantoprazole  Injectable 40 milliGRAM(s) IV Push two times a day  piperacillin/tazobactam IVPB.. 3.375 Gram(s) IV Intermittent every 8 hours  propofol Infusion 5 MICROgram(s)/kG/Min (2.946 mL/Hr) IV Continuous <Continuous>      REVIEW OF SYSTEMS:    Unable to obtain due to mechnical ventilation, sedation, poor mental status       Physicial Exam:     HEENT: PERRLA, EOMI, no drainage or redness  Neck: No bruits; no thyromegaly or nodules,  No JVD  Respiratory: Breath Sounds equal & clear to percussion & auscultation, no accessory muscle use  Cardiovascular: Regular rate & rhythm, normal S1, S2; no murmurs, gallops or rubs; no S3, S4  Gastrointestinal: Soft, non-tender, non distended no hepatosplenomegaly, normal bowel sounds  Extremities: No peripheral edema, No cyanosis, clubbing   Vascular: Equal and normal pulses: 2+ peripheral pulses throughout  Neurological:  no sensory, motor  deficits, normal reflexes  Psychiatric: Normal mood, normal affect  Musculoskeletal: No joint pain, swelling or deformity; no limitation of movement  Skin: No rashes 78y  Male  No Known Allergies    Patient is a 78y old  Male who presents with a chief complaint of Worsening agitation and difficulty ambulating (2019 12:09)    HPI:  79 yo M with PMHx of Alzheimer Dementia, gout, HTN brought in by wife from home for declining mental status and difficulty ambulating x 1 week, decreased PO intake and worsening agitation. He was admitted but was a rapid response on the floor for respiratory distress, was found not managing his airway  with audible gurgling, tachycardic, hypoxia and subsequently intubated for respiratory failure jayde to apparent aspiration.     an ng tube was placed with 300 cc of dark colored vomitus obtained ( thought to be upper GI bleeding ) . He presented with elevated lactate level secondary to sepsis due from aspiration. He required IV pressors for hypotension for brief period last night but has been sustaining good MAP's off levophed for today     PAST MEDICAL & SURGICAL HISTORY:  Gout  Hypertension  Dementia  H/O inguinal hernia repair    FAMILY HISTORY:  Family history of dementia: Mother  FHx: diabetes mellitus    Vital Signs Last 24 Hrs  T(C): 37.1 (2019 15:00), Max: 37.2 (2019 21:00)  T(F): 98.8 (2019 15:00), Max: 99 (2019 21:00)  HR: 71 (2019 19:00) (62 - 102)  BP: 119/58 (2019 19:00) (88/52 - 207/180)  BP(mean): 77 (2019 19:00) (64 - 191)  RR: 16 (2019 19:00) (11 - 25)  SpO2: 94% (2019 19:00) (94% - 100%)  ABG - ( 2019 12:52 )  pH, Arterial: 7.40  pH, Blood: x     /  pCO2: 34    /  pO2: 84    / HCO3: x     / Base Excess: x     /  SaO2: 95          I&O's Summary  2019 07:01  -  2019 07:00  --------------------------------------------------------  IN: 4932.7 mL / OUT: 5830 mL / NET: -897.3 mL    2019 07:01  -  2019 20:30  --------------------------------------------------------  IN: 1533.8 mL / OUT: 1940 mL / NET: -406.2 mL    LABS      141  |  107  |  56<H>  ----------------------------<  158<H>  3.6   |  25  |  2.07<H>    Ca    8.3<L>      2019 12:30  Phos  3.6       Mg     2.1       TPro  5.9<L>  /  Alb  2.2<L>  /  TBili  0.7  /  DBili  x   /  AST  20  /  ALT  15  /  AlkPhos  48                         9.1    8.04  )-----------( 156      ( 2019 12:30 )             28.1     CARDIAC MARKERS ( 2019 05:00 )  .027 ng/mL / x     / x     / x     / x      CARDIAC MARKERS ( 2019 23:00 )  .050 ng/mL / x     / 236 U/L / x     / 4.0 ng/mL  CARDIAC MARKERS ( 2019 18:25 )  .058 ng/mL / x     / x     / x     / x      CARDIAC MARKERS ( 2019 14:50 )  .053 ng/mL / x     / x     / x     / x      CARDIAC MARKERS ( 2019 10:50 )  <.017 ng/mL / x     / x     / x     / x        LIVER FUNCTIONS - ( 2019 18:25 )  Alb: 2.2 g/dL / Pro: 5.9 g/dL / ALK PHOS: 48 U/L / ALT: 15 U/L DA / AST: 20 U/L / GGT: x           CAPILLARY BLOOD GLUCOSE  POCT Blood Glucose.: 124 mg/dL (2019 16:50)    Urinalysis Basic - ( 2019 12:13 )  Color: Yellow / Appearance: Clear / S.020 / pH: x  Gluc: x / Ketone: Negative  / Bili: Negative / Urobili: Negative   Blood: x / Protein: 15 / Nitrite: Negative   Leuk Esterase: Negative / RBC: 5-10 /HPF / WBC 0-2 /HPF   Sq Epi: x / Non Sq Epi: Neg.-Few / Bacteria: Few /HPF    VENT SETTINGS   Mode: AC/ CMV (Assist Control/ Continuous Mandatory Ventilation)  RR (machine): 12  TV (machine): 500  FiO2: 30  PEEP: 5  PS: 5  MAP: 9.5  PIP: 21    MEDICATIONS  (STANDING):  chlorhexidine 0.12% Liquid 15 milliLiter(s) Oral Mucosa every 12 hours  chlorhexidine 4% Liquid 1 Application(s) Topical <User Schedule>  dexmedetomidine Infusion 0.2 MICROgram(s)/kG/Hr (4.91 mL/Hr) IV Continuous <Continuous>  dextrose 5%. 1000 milliLiter(s) (50 mL/Hr) IV Continuous <Continuous>  dextrose 50% Injectable 12.5 Gram(s) IV Push once  dextrose 50% Injectable 25 Gram(s) IV Push once  dextrose 50% Injectable 25 Gram(s) IV Push once  insulin lispro (HumaLOG) corrective regimen sliding scale   SubCutaneous every 6 hours  lactated ringers. 1000 milliLiter(s) (125 mL/Hr) IV Continuous <Continuous>  norepinephrine Infusion 0.05 MICROgram(s)/kG/Min (9.206 mL/Hr) IV Continuous <Continuous>  pantoprazole  Injectable 40 milliGRAM(s) IV Push two times a day  piperacillin/tazobactam IVPB.. 3.375 Gram(s) IV Intermittent every 8 hours  propofol Infusion 5 MICROgram(s)/kG/Min (2.946 mL/Hr) IV Continuous <Continuous>      REVIEW OF SYSTEMS:    Unable to obtain due to mechnical ventilation, sedation, poor mental status       Physicial Exam:     HEENT: PERRLA, EOMI, no drainage or redness  Neck: No bruits; no thyromegaly or nodules,  No JVD  Respiratory: Breath Sounds equal & clear to percussion & auscultation, no accessory muscle use  Cardiovascular: Regular rate & rhythm, normal S1, S2; no murmurs, gallops or rubs; no S3, S4  Gastrointestinal: Soft, non-tender, non distended no hepatosplenomegaly, normal bowel sounds  Extremities: No peripheral edema, No cyanosis, clubbing   Vascular: Equal and normal pulses: 2+ peripheral pulses throughout  Neurological:  no sensory, motor  deficits, normal reflexes  Psychiatric: Normal mood, normal affect  Musculoskeletal: No joint pain, swelling or deformity; no limitation of movement  Skin: No rashes

## 2019-07-28 NOTE — PROGRESS NOTE ADULT - ASSESSMENT
77 y/o male with multiple medical issues no intubated in CCU after episode of respiratory distress. NGT output remains persistent, coffee ground/dark in nature. H/H dropped to 9.4/28.6.

## 2019-07-28 NOTE — PROGRESS NOTE ADULT - PROBLEM SELECTOR PLAN 5
dark vomitus thought to be gastric bleeding  out put has slowed down this evening  GI consulted appreciated  continue ng tube to low wall suction   Hold all PO medications for now   Trend CBC

## 2019-07-28 NOTE — DIETITIAN INITIAL EVALUATION ADULT. - ENTERAL
If NGT permitted for nutritional use (per GI/medical team), recommend initiate Jevity 1.5 @30cc/hr x 18 hrs via NGT and increase by 10cc/hr q 4hrs until goal of 75cc/hr x 18 hours reached. Would provide 1,350ml total volume/day: 2,025kcals, 85g protein, 1,026ml H2O

## 2019-07-28 NOTE — DIETITIAN INITIAL EVALUATION ADULT. - PERTINENT MEDS FT
vancomycin, zosyn, peridex, hibiclens, IV LR, SSI, zofran, levophed, protonix, propofol, magnesium sulfate

## 2019-07-28 NOTE — PROGRESS NOTE ADULT - SUBJECTIVE AND OBJECTIVE BOX
INTERVAL HPI/OVERNIGHT EVENTS:  HPI:  77 y/o male pmh Alzheimer Dementia, gout, HTN who was brought in by wife due to decrease in mobility. Patient seen and examined at bedside. NGT intact and dark, coffee ground output seen in tube and canister.    MEDICATIONS  (STANDING):  chlorhexidine 0.12% Liquid 15 milliLiter(s) Oral Mucosa every 12 hours  chlorhexidine 4% Liquid 1 Application(s) Topical <User Schedule>  dexmedetomidine Infusion 0.2 MICROgram(s)/kG/Hr (4.91 mL/Hr) IV Continuous <Continuous>  dextrose 5%. 1000 milliLiter(s) (50 mL/Hr) IV Continuous <Continuous>  dextrose 50% Injectable 12.5 Gram(s) IV Push once  dextrose 50% Injectable 25 Gram(s) IV Push once  dextrose 50% Injectable 25 Gram(s) IV Push once  insulin lispro (HumaLOG) corrective regimen sliding scale   SubCutaneous every 6 hours  lactated ringers. 1000 milliLiter(s) (125 mL/Hr) IV Continuous <Continuous>  norepinephrine Infusion 0.05 MICROgram(s)/kG/Min (9.206 mL/Hr) IV Continuous <Continuous>  pantoprazole  Injectable 40 milliGRAM(s) IV Push two times a day  piperacillin/tazobactam IVPB.. 3.375 Gram(s) IV Intermittent every 8 hours  propofol Infusion 5 MICROgram(s)/kG/Min (2.946 mL/Hr) IV Continuous <Continuous>    MEDICATIONS  (PRN):  acetaminophen   Tablet .. 650 milliGRAM(s) Oral every 6 hours PRN Mild Pain (1 - 3)  dextrose 40% Gel 15 Gram(s) Oral once PRN Blood Glucose LESS THAN 70 milliGRAM(s)/deciliter  glucagon  Injectable 1 milliGRAM(s) IntraMuscular once PRN Glucose LESS THAN 70 milligrams/deciliter  ondansetron Injectable 4 milliGRAM(s) IV Push every 6 hours PRN Nausea and/or Vomiting  sodium chloride 0.9% lock flush 10 milliLiter(s) IV Push every 1 hour PRN Pre/post blood products, medications, blood draw, and to maintain line patency      Allergies    No Known Allergies    Intolerances        PHYSICAL EXAM:   Vital Signs:  Vital Signs Last 24 Hrs  T(C): 36.9 (2019 07:00), Max: 37.7 (2019 16:00)  T(F): 98.4 (2019 07:00), Max: 99.9 (2019 16:00)  HR: 64 (2019 10:00) (64 - 120)  BP: 127/61 (2019 10:00) (85/57 - 149/69)  BP(mean): 81 (2019 10:00) (64 - 103)  RR: 16 (2019 10:00) (14 - 26)  SpO2: 99% (2019 10:00) (94% - 100%)  Daily     Daily Weight in k.6 (2019 07:00)I&O's Summary    2019 07:  -  2019 07:00  --------------------------------------------------------  IN: 4932.7 mL / OUT: 5830 mL / NET: -897.3 mL    2019 07:01  -  2019 11:55  --------------------------------------------------------  IN: 495.8 mL / OUT: 350 mL / NET: 145.8 mL    GENERAL:  Appears stated age  HEENT:  NC/AT,  conjunctivae clear and pink, intubated, NGT intact  CHEST:  Full & symmetric excursion, no increased effort, breath sounds clear  HEART:  Regular rhythm, S1, S2  ABDOMEN:  Softly distended, hypoactive bowel sounds,  EXTEREMITIES:  no edema  SKIN:  No rash, warm/dry  NEURO:  Sedated        LABS:                        9.4    9.76  )-----------( 177      ( 2019 05:00 )             28.6         142  |  108  |  64<H>  ----------------------------<  161<H>  3.6   |  25  |  2.50<H>    Ca    8.5      2019 05:00  Phos  3.7       Mg     1.8         TPro  5.9<L>  /  Alb  2.2<L>  /  TBili  0.7  /  DBili  x   /  AST  20  /  ALT  15  /  AlkPhos  48        Urinalysis Basic - ( 2019 12:13 )    Color: Yellow / Appearance: Clear / S.020 / pH: x  Gluc: x / Ketone: Negative  / Bili: Negative / Urobili: Negative   Blood: x / Protein: 15 / Nitrite: Negative   Leuk Esterase: Negative / RBC: 5-10 /HPF / WBC 0-2 /HPF   Sq Epi: x / Non Sq Epi: Neg.-Few / Bacteria: Few /HPF

## 2019-07-28 NOTE — PROGRESS NOTE ADULT - PROBLEM SELECTOR PLAN 1
NPO  NGT-> LWS  Continue PPI BID  Monitor H/H  Most likely upper endoscopy tomorrow with Dr. Thomas  Will order reglan tomorrow to be given prior to procedure

## 2019-07-28 NOTE — DIETITIAN INITIAL EVALUATION ADULT. - ENERGY NEEDS
Height: 5'7", Weight: (7/26) 216.4lbs, BMI: 33.9  Skin: intact, Edema: +2/+3 bilateral feet, ankles  IBW range: 133-163lbs

## 2019-07-28 NOTE — PROGRESS NOTE ADULT - ASSESSMENT
78 year old male with sepsis from aspiration pna and acute hypoxic respiratory failure, Gram positive bacteremia , OLVIN, shock has resolved for now     Critical Care time: 35 mins assessing presenting problems of acute illness that poses high probability of life threatening deterioration or end organ damage/dysfunction.  Medical decision making inculding Initiating plan of care, reviewing data, reviewing radiology,direct patient bedside evaluation and interpretation of vital signs, any necessary ventilator management , discusion with multidisciplinary team, discussing goals of care with patient/family, all non inclusive of procedures 78 year old male with sepsis from aspiration pna and acute hypoxic respiratory failure, Gram positive bacteremia , OLVIN, shock has resolved for now, suspected upper GI bleeding     Critical Care time: 35 mins assessing presenting problems of acute illness that poses high probability of life threatening deterioration or end organ damage/dysfunction.  Medical decision making inculding Initiating plan of care, reviewing data, reviewing radiology,direct patient bedside evaluation and interpretation of vital signs, any necessary ventilator management , discusion with multidisciplinary team, discussing goals of care with patient/family, all non inclusive of procedures

## 2019-07-28 NOTE — PROGRESS NOTE ADULT - SUBJECTIVE AND OBJECTIVE BOX
Follow-up Critical Care Progress Note  Chief Complaint : Altered mental status    Sedated, intubated. Overnight one dark colored BM. Appears comfortable. Switched to precedex overnight as became hypotensive with propofol.    Allergies :No Known Allergies      PAST MEDICAL & SURGICAL HISTORY:  Gout  Hypertension  Dementia  H/O inguinal hernia repair      Medications:  MEDICATIONS  (STANDING):  chlorhexidine 0.12% Liquid 15 milliLiter(s) Oral Mucosa every 12 hours  chlorhexidine 4% Liquid 1 Application(s) Topical <User Schedule>  dexmedetomidine Infusion 0.2 MICROgram(s)/kG/Hr (4.91 mL/Hr) IV Continuous <Continuous>  dextrose 5%. 1000 milliLiter(s) (50 mL/Hr) IV Continuous <Continuous>  dextrose 50% Injectable 12.5 Gram(s) IV Push once  dextrose 50% Injectable 25 Gram(s) IV Push once  dextrose 50% Injectable 25 Gram(s) IV Push once  insulin lispro (HumaLOG) corrective regimen sliding scale   SubCutaneous every 6 hours  lactated ringers. 1000 milliLiter(s) (125 mL/Hr) IV Continuous <Continuous>  magnesium sulfate  IVPB 1 Gram(s) IV Intermittent once  norepinephrine Infusion 0.05 MICROgram(s)/kG/Min (9.206 mL/Hr) IV Continuous <Continuous>  pantoprazole  Injectable 40 milliGRAM(s) IV Push two times a day  piperacillin/tazobactam IVPB.. 3.375 Gram(s) IV Intermittent every 8 hours  propofol Infusion 5 MICROgram(s)/kG/Min (2.946 mL/Hr) IV Continuous <Continuous>  vancomycin  IVPB 1000 milliGRAM(s) IV Intermittent once    MEDICATIONS  (PRN):  acetaminophen   Tablet .. 650 milliGRAM(s) Oral every 6 hours PRN Mild Pain (1 - 3)  dextrose 40% Gel 15 Gram(s) Oral once PRN Blood Glucose LESS THAN 70 milliGRAM(s)/deciliter  glucagon  Injectable 1 milliGRAM(s) IntraMuscular once PRN Glucose LESS THAN 70 milligrams/deciliter  ondansetron Injectable 4 milliGRAM(s) IV Push every 6 hours PRN Nausea and/or Vomiting  sodium chloride 0.9% lock flush 10 milliLiter(s) IV Push every 1 hour PRN Pre/post blood products, medications, blood draw, and to maintain line patency      LABS:                        9.4    9.76  )-----------( 177      ( 2019 05:00 )             28.6         142  |  108  |  64<H>  ----------------------------<  161<H>  3.6   |  25  |  2.50<H>    Ca    8.5      2019 05:00  Phos  3.7       Mg     1.8         TPro  5.9<L>  /  Alb  2.2<L>  /  TBili  0.7  /  DBili  x   /  AST  20  /  ALT  15  /  AlkPhos  48        CARDIAC MARKERS ( 2019 05:00 )  .027 ng/mL / x     / x     / x     / x      CARDIAC MARKERS ( 2019 23:00 )  .050 ng/mL / x     / 236 U/L / x     / 4.0 ng/mL  CARDIAC MARKERS ( 2019 18:25 )  .058 ng/mL / x     / x     / x     / x      CARDIAC MARKERS ( 2019 14:50 )  .053 ng/mL / x     / x     / x     / x      CARDIAC MARKERS ( 2019 10:50 )  <.017 ng/mL / x     / x     / x     / x        Urinalysis Basic - ( 2019 12:13 )    Color: Yellow / Appearance: Clear / S.020 / pH: x  Gluc: x / Ketone: Negative  / Bili: Negative / Urobili: Negative   Blood: x / Protein: 15 / Nitrite: Negative   Leuk Esterase: Negative / RBC: 5-10 /HPF / WBC 0-2 /HPF   Sq Epi: x / Non Sq Epi: Neg.-Few / Bacteria: Few /HPF    CULTURES: (if applicable)    Culture - Blood (collected 19 @ 12:13)  Source: .Blood Blood  Gram Stain (19 @ 08:04):    Growth in aerobic bottle: Gram Positive Cocci in Pairs and Chains  Preliminary Report (19 @ 08:03):    Growth in aerobic bottle: Gram Positive Cocci in Pairs and Chains    "Due to technical problems, Proteus sp. will Not be reported as part of    the BCID panel until further notice"    ***Blood Panel PCR results on this specimen are available    approximately 3 hours after the Gram stain result.***    Gram stain, PCR, and/or culture results may not always    correspond due to difference in methodologies.    ************************************************************    This PCR assay was performed using [employer].    The following targets are tested for: Enterococcus,    vancomycin resistant enterococci, Listeria monocytogenes,    coagulase negative staphylococci, S. aureus,    methicillin resistant S. aureus, Streptococcus agalactiae    (Group B), S. pneumoniae, S. pyogenes (Group A),    Acinetobacter baumannii, Enterobacter cloacae, E. coli,    Klebsiella oxytoca, K. pneumoniae, Proteus sp.,    Serratia marcescens, Haemophilus influenzae,    Neisseria meningitidis, Pseudomonas aeruginosa, Candida    albicans, C.glabrata, C krusei, C parapsilosis,    C. tropicalis and the KPC resistance gene.    Culture - Sputum (collected 19 @ 12:05)  Source: .Sputum Sputum  Gram Stain (19 @ 22:50):    Moderate polymorphonuclear leukocytes per low power field    Few Squamous epithelial cells per low power field    Moderate Yeast like cells per oil power field    Moderate Gram Positive Cocci in Pairs and Chains per oil power field    Culture - Urine (collected 19 @ 12:30)  Source: .Urine Clean Catch (Midstream)  Final Report (19 @ 12:45):    No growth    Culture - Blood (collected 19 @ 11:20)  Source: .Blood Blood-Peripheral  Preliminary Report (19 @ 16:01):    No growth to date.    Culture - Blood (collected 19 @ 11:20)  Source: .Blood Blood-Peripheral  Preliminary Report (19 @ 16:01):    No growth to date.    Culture - Blood (collected 19 @ 17:46)  Source: .Blood Blood-Peripheral  Final Report (19 @ 02:00):    No growth at 5 days.    Culture - Blood (collected 19 @ 17:46)  Source: .Blood Blood-Peripheral  Final Report (19 @ 02:00):    No growth at 5 days.    ABG - ( 2019 12:52 )  pH, Arterial: 7.40  pH, Blood: x     /  pCO2: 34    /  pO2: 84    / HCO3: x     / Base Excess: x     /  SaO2: 95        VITALS:  T(C): 37.2 (19 @ 05:00), Max: 37.7 (19 @ 16:00)  T(F): 99 (19 @ 05:00), Max: 99.9 (19 @ 16:00)  HR: 72 (19 @ 08:55) (72 - 140)  BP: 149/69 (19 @ 06:00) (75/55 - 149/69)  BP(mean): 93 (19 @ 06:00) (63 - 103)  ABP: --  ABP(mean): --  RR: 21 (19 @ 06:00) (16 - 26)  SpO2: 95% (19 @ 08:55) (94% - 100%)  CVP(mm Hg): --  CVP(cm H2O): --    Ins and Outs     19 @ 07:01  -  19 @ 07:00  --------------------------------------------------------  IN: 4760.9 mL / OUT: 5530 mL / NET: -769.1 mL        Height (cm): 170.2 (19 @ 17:39)  Weight (kg): 98.2 (19 @ 06:00)  BMI (kg/m2): 33.9 (19 @ 06:00)    Device: 840, Mode: AC/ CMV (Assist Control/ Continuous Mandatory Ventilation), RR (machine): 12, RR (patient): 19, TV (machine): 500, TV (patient): 550, FiO2: 30, PEEP: 5, PS: 5, MAP: 9.5, PIP: 22    I&O's Detail    2019 07:01  -  2019 07:00  --------------------------------------------------------  IN:    dexmedetomidine Infusion: 51.6 mL    Lactated Ringers IV Bolus: 2000 mL    lactated ringers.: 2000 mL    norepinephrine Infusion: 81 mL    Oral Fluid: 200 mL    propofol Infusion: 28.3 mL    Solution: 100 mL    Solution: 300 mL  Total IN: 4760.9 mL    OUT:    Indwelling Catheter - Urethral: 4940 mL    Nasoenteral Tube: 590 mL  Total OUT: 5530 mL    Total NET: -769.1 mL

## 2019-07-28 NOTE — DIETITIAN INITIAL EVALUATION ADULT. - OTHER INFO
78 year old male with PMH Alzheimer Dementia, gout, HTN admitted for change in mental status.  Developed acute OLVIN of uncertain etiology, now with likely aspiration event, possible GIB.  Pt is intubated/sedated. NGT to LWS with dark brown drainage. Noted pt was transferred to ICU s/p RRT. Was previously on DASH/TLC diet and consuming 0-50% x 2 days on medical floor. GI following. Unable to obtain subjective information at this time. NKFA per chart review. LAst BM 7/26.

## 2019-07-28 NOTE — DIETITIAN INITIAL EVALUATION ADULT. - PROBLEM SELECTOR PLAN 1
CT head negative for acute pathology, worsening agitation maybe 2/2 worsening dementia vs. polypharmacy as pt has Rx for Alprazolam 0.25mg, 0.5mg, and Zolpidem 0.5mg, trazodone 100mg qHS  - check TSH, vitamin B12, folate, RPR  - will continue Trazodone for now   - will keep Alprazolam for now as needed to avoid withdrawal, consider tapering outpt  - Seroquel PRN for agitation  - will also check PVR to assess for urinary retention  - Neurology, Dr. Moies, consulted, will see pt in AM

## 2019-07-29 LAB
ALBUMIN SERPL ELPH-MCNC: 2 G/DL — LOW (ref 3.3–5)
ALP SERPL-CCNC: 48 U/L — SIGNIFICANT CHANGE UP (ref 40–120)
ALT FLD-CCNC: 14 U/L DA — SIGNIFICANT CHANGE UP (ref 10–45)
ANION GAP SERPL CALC-SCNC: 7 MMOL/L — SIGNIFICANT CHANGE UP (ref 5–17)
AST SERPL-CCNC: 19 U/L — SIGNIFICANT CHANGE UP (ref 10–40)
BILIRUB SERPL-MCNC: 0.7 MG/DL — SIGNIFICANT CHANGE UP (ref 0.2–1.2)
BUN SERPL-MCNC: 36 MG/DL — HIGH (ref 7–23)
CALCIUM SERPL-MCNC: 8.4 MG/DL — SIGNIFICANT CHANGE UP (ref 8.4–10.5)
CHLORIDE SERPL-SCNC: 107 MMOL/L — SIGNIFICANT CHANGE UP (ref 96–108)
CO2 SERPL-SCNC: 29 MMOL/L — SIGNIFICANT CHANGE UP (ref 22–31)
CREAT SERPL-MCNC: 1.52 MG/DL — HIGH (ref 0.5–1.3)
CULTURE RESULTS: SIGNIFICANT CHANGE UP
CULTURE RESULTS: SIGNIFICANT CHANGE UP
GLUCOSE BLDC GLUCOMTR-MCNC: 131 MG/DL — HIGH (ref 70–99)
GLUCOSE BLDC GLUCOMTR-MCNC: 144 MG/DL — HIGH (ref 70–99)
GLUCOSE BLDC GLUCOMTR-MCNC: 149 MG/DL — HIGH (ref 70–99)
GLUCOSE BLDC GLUCOMTR-MCNC: 198 MG/DL — HIGH (ref 70–99)
GLUCOSE SERPL-MCNC: 145 MG/DL — HIGH (ref 70–99)
HCT VFR BLD CALC: 27.5 % — LOW (ref 39–50)
HGB BLD-MCNC: 8.9 G/DL — LOW (ref 13–17)
INR BLD: 1.3 RATIO — HIGH (ref 0.88–1.16)
MAGNESIUM SERPL-MCNC: 1.8 MG/DL — SIGNIFICANT CHANGE UP (ref 1.6–2.6)
MCHC RBC-ENTMCNC: 28.7 PG — SIGNIFICANT CHANGE UP (ref 27–34)
MCHC RBC-ENTMCNC: 32.4 GM/DL — SIGNIFICANT CHANGE UP (ref 32–36)
MCV RBC AUTO: 88.7 FL — SIGNIFICANT CHANGE UP (ref 80–100)
NRBC # BLD: 0 /100 WBCS — SIGNIFICANT CHANGE UP (ref 0–0)
ORGANISM # SPEC MICROSCOPIC CNT: SIGNIFICANT CHANGE UP
ORGANISM # SPEC MICROSCOPIC CNT: SIGNIFICANT CHANGE UP
PHOSPHATE SERPL-MCNC: 3.8 MG/DL — SIGNIFICANT CHANGE UP (ref 2.5–4.5)
PLATELET # BLD AUTO: 148 K/UL — LOW (ref 150–400)
POTASSIUM SERPL-MCNC: 3.4 MMOL/L — LOW (ref 3.5–5.3)
POTASSIUM SERPL-SCNC: 3.4 MMOL/L — LOW (ref 3.5–5.3)
PROT SERPL-MCNC: 5.6 G/DL — LOW (ref 6–8.3)
PROTHROM AB SERPL-ACNC: 14.7 SEC — HIGH (ref 10–12.9)
RBC # BLD: 3.1 M/UL — LOW (ref 4.2–5.8)
RBC # FLD: 13.2 % — SIGNIFICANT CHANGE UP (ref 10.3–14.5)
SODIUM SERPL-SCNC: 143 MMOL/L — SIGNIFICANT CHANGE UP (ref 135–145)
SPECIMEN SOURCE: SIGNIFICANT CHANGE UP
SPECIMEN SOURCE: SIGNIFICANT CHANGE UP
WBC # BLD: 8.28 K/UL — SIGNIFICANT CHANGE UP (ref 3.8–10.5)
WBC # FLD AUTO: 8.28 K/UL — SIGNIFICANT CHANGE UP (ref 3.8–10.5)

## 2019-07-29 PROCEDURE — 99223 1ST HOSP IP/OBS HIGH 75: CPT

## 2019-07-29 PROCEDURE — 71045 X-RAY EXAM CHEST 1 VIEW: CPT | Mod: 26

## 2019-07-29 RX ORDER — PANTOPRAZOLE SODIUM 20 MG/1
8 TABLET, DELAYED RELEASE ORAL
Qty: 80 | Refills: 0 | Status: DISCONTINUED | OUTPATIENT
Start: 2019-07-29 | End: 2019-08-01

## 2019-07-29 RX ORDER — VANCOMYCIN HCL 1 G
750 VIAL (EA) INTRAVENOUS ONCE
Refills: 0 | Status: COMPLETED | OUTPATIENT
Start: 2019-07-29 | End: 2019-07-29

## 2019-07-29 RX ORDER — POTASSIUM CHLORIDE 20 MEQ
10 PACKET (EA) ORAL
Refills: 0 | Status: COMPLETED | OUTPATIENT
Start: 2019-07-29 | End: 2019-07-29

## 2019-07-29 RX ORDER — CEFTRIAXONE 500 MG/1
1000 INJECTION, POWDER, FOR SOLUTION INTRAMUSCULAR; INTRAVENOUS EVERY 24 HOURS
Refills: 0 | Status: DISCONTINUED | OUTPATIENT
Start: 2019-07-29 | End: 2019-08-01

## 2019-07-29 RX ORDER — CHLORHEXIDINE GLUCONATE 213 G/1000ML
15 SOLUTION TOPICAL EVERY 12 HOURS
Refills: 0 | Status: DISCONTINUED | OUTPATIENT
Start: 2019-07-29 | End: 2019-07-30

## 2019-07-29 RX ORDER — VANCOMYCIN HCL 1 G
750 VIAL (EA) INTRAVENOUS EVERY 12 HOURS
Refills: 0 | Status: DISCONTINUED | OUTPATIENT
Start: 2019-07-29 | End: 2019-07-29

## 2019-07-29 RX ORDER — CHLORHEXIDINE GLUCONATE 213 G/1000ML
15 SOLUTION TOPICAL EVERY 12 HOURS
Refills: 0 | Status: DISCONTINUED | OUTPATIENT
Start: 2019-07-29 | End: 2019-07-29

## 2019-07-29 RX ORDER — SODIUM CHLORIDE 9 MG/ML
1000 INJECTION, SOLUTION INTRAVENOUS
Refills: 0 | Status: DISCONTINUED | OUTPATIENT
Start: 2019-07-29 | End: 2019-08-01

## 2019-07-29 RX ORDER — VANCOMYCIN HCL 1 G
VIAL (EA) INTRAVENOUS
Refills: 0 | Status: DISCONTINUED | OUTPATIENT
Start: 2019-07-29 | End: 2019-07-29

## 2019-07-29 RX ADMIN — SODIUM CHLORIDE 125 MILLILITER(S): 9 INJECTION, SOLUTION INTRAVENOUS at 02:52

## 2019-07-29 RX ADMIN — CHLORHEXIDINE GLUCONATE 15 MILLILITER(S): 213 SOLUTION TOPICAL at 17:09

## 2019-07-29 RX ADMIN — PROPOFOL 2.95 MICROGRAM(S)/KG/MIN: 10 INJECTION, EMULSION INTRAVENOUS at 17:29

## 2019-07-29 RX ADMIN — Medication 100 MILLIEQUIVALENT(S): at 09:12

## 2019-07-29 RX ADMIN — DEXMEDETOMIDINE HYDROCHLORIDE IN 0.9% SODIUM CHLORIDE 4.91 MICROGRAM(S)/KG/HR: 4 INJECTION INTRAVENOUS at 15:44

## 2019-07-29 RX ADMIN — DEXMEDETOMIDINE HYDROCHLORIDE IN 0.9% SODIUM CHLORIDE 4.91 MICROGRAM(S)/KG/HR: 4 INJECTION INTRAVENOUS at 18:47

## 2019-07-29 RX ADMIN — DEXMEDETOMIDINE HYDROCHLORIDE IN 0.9% SODIUM CHLORIDE 4.91 MICROGRAM(S)/KG/HR: 4 INJECTION INTRAVENOUS at 22:17

## 2019-07-29 RX ADMIN — Medication 100 MILLIEQUIVALENT(S): at 10:15

## 2019-07-29 RX ADMIN — CEFTRIAXONE 100 MILLIGRAM(S): 500 INJECTION, POWDER, FOR SOLUTION INTRAMUSCULAR; INTRAVENOUS at 15:30

## 2019-07-29 RX ADMIN — DEXMEDETOMIDINE HYDROCHLORIDE IN 0.9% SODIUM CHLORIDE 4.91 MICROGRAM(S)/KG/HR: 4 INJECTION INTRAVENOUS at 22:09

## 2019-07-29 RX ADMIN — PIPERACILLIN AND TAZOBACTAM 25 GRAM(S): 4; .5 INJECTION, POWDER, LYOPHILIZED, FOR SOLUTION INTRAVENOUS at 05:33

## 2019-07-29 RX ADMIN — PANTOPRAZOLE SODIUM 40 MILLIGRAM(S): 20 TABLET, DELAYED RELEASE ORAL at 05:31

## 2019-07-29 RX ADMIN — PROPOFOL 2.95 MICROGRAM(S)/KG/MIN: 10 INJECTION, EMULSION INTRAVENOUS at 10:48

## 2019-07-29 RX ADMIN — DEXMEDETOMIDINE HYDROCHLORIDE IN 0.9% SODIUM CHLORIDE 4.91 MICROGRAM(S)/KG/HR: 4 INJECTION INTRAVENOUS at 07:00

## 2019-07-29 RX ADMIN — CHLORHEXIDINE GLUCONATE 15 MILLILITER(S): 213 SOLUTION TOPICAL at 05:31

## 2019-07-29 RX ADMIN — SODIUM CHLORIDE 125 MILLILITER(S): 9 INJECTION, SOLUTION INTRAVENOUS at 16:56

## 2019-07-29 RX ADMIN — CHLORHEXIDINE GLUCONATE 1 APPLICATION(S): 213 SOLUTION TOPICAL at 05:31

## 2019-07-29 RX ADMIN — PANTOPRAZOLE SODIUM 40 MILLIGRAM(S): 20 TABLET, DELAYED RELEASE ORAL at 17:09

## 2019-07-29 RX ADMIN — PANTOPRAZOLE SODIUM 10 MG/HR: 20 TABLET, DELAYED RELEASE ORAL at 18:20

## 2019-07-29 RX ADMIN — DEXMEDETOMIDINE HYDROCHLORIDE IN 0.9% SODIUM CHLORIDE 4.91 MICROGRAM(S)/KG/HR: 4 INJECTION INTRAVENOUS at 12:49

## 2019-07-29 RX ADMIN — SODIUM CHLORIDE 125 MILLILITER(S): 9 INJECTION, SOLUTION INTRAVENOUS at 07:00

## 2019-07-29 RX ADMIN — Medication 250 MILLIGRAM(S): at 10:58

## 2019-07-29 NOTE — CONSULT NOTE ADULT - ASSESSMENT
Palliative;  Call placed to family to review GOC/Advanced directives.  Not able to leave message.  Will try again tomorrow.     Assessment:  1. Acute respiratory failure  2. Aspiration Pneumonitis   3. Acute renal failure  4. Hypotension   5. Sepsis  6. Emesis   7. Acute blood loss anemia   8. Alzheimer's disease   9. Lactic acidosis  10. Gram positive bacteremia     - Mechanical ventilation with lung protective strategy   - Sedated for ventilator synchrony   - F/u Blood, sputum cultures. UA negative.   - Broad spectrum antibiotics   - ID consult  - Check Echo  - Marrero placed to monitor urine output  - Renal function improving   - Lactate normalized   - Hold all antihypertensives  - Vasopressor support to maintain MAP > 65  - Hold all PO medications for now   - GI consulted appreciated  - Trend Hgb   - PPI IV BID   - SCD for DVT prophylaxis .

## 2019-07-29 NOTE — CONSULT NOTE ADULT - SUBJECTIVE AND OBJECTIVE BOX
HPI:  77 yo M with PMHx of Alzheimer Dementia, gout, HTN brought in by wife from home for declining mental status and difficulty ambulating x 1 week. History obtained from pt's wife at bedside. Pt has known dementia but over the past week, his wife has noticed that he had difficulty ambulating with swelling of both feet, decreased PO intake and worsening agitation.Pt was seen by his PCP, Dr. Adame who started treatment for gout and sent Rx for steroids but his wife has not picked up the prescription.  He has been taking Alprazolam for many years, but recently also prescribed Zolpidem at bedtime as needed for increased agitation. His wife also notices decrease in urine output, denies recent illness, falls, LOC, fevers, chills, nausea, vomiting, diarrhea (25 Jul 2019 16:55)  Pt had respiratory arrest and is now intubated.    PAST MEDICAL & SURGICAL HISTORY:  Gout  Hypertension  Dementia  H/O inguinal hernia repair      SOCIAL HISTORY:    Admitted from:  home   Substance abuse history:              Tobacco hx:   1/2 ppd x 30 years.  quit 30years ago             Surrogate/HCP/Guardian: Gabe           Phone#:[phone] or [phone]    FAMILY HISTORY:  Family history of dementia: Mother  FHx: diabetes mellitus    Baseline ADLs (prior to admission): Decreasing functional status    Allergies    No Known Allergies    Intolerances      Present Symptoms:   pt intubated and sedated         Review of Systems:  Unable to obtain due to poor mentation    MEDICATIONS  (STANDING):  cefTRIAXone   IVPB 1000 milliGRAM(s) IV Intermittent every 24 hours  chlorhexidine 0.12% Liquid 15 milliLiter(s) Oral Mucosa every 12 hours  chlorhexidine 4% Liquid 1 Application(s) Topical <User Schedule>  dexmedetomidine Infusion 0.2 MICROgram(s)/kG/Hr (4.91 mL/Hr) IV Continuous <Continuous>  dextrose 5%. 1000 milliLiter(s) (50 mL/Hr) IV Continuous <Continuous>  dextrose 50% Injectable 12.5 Gram(s) IV Push once  dextrose 50% Injectable 25 Gram(s) IV Push once  dextrose 50% Injectable 25 Gram(s) IV Push once  insulin lispro (HumaLOG) corrective regimen sliding scale   SubCutaneous every 6 hours  lactated ringers. 1000 milliLiter(s) (125 mL/Hr) IV Continuous <Continuous>  pantoprazole  Injectable 40 milliGRAM(s) IV Push two times a day  propofol Infusion 5 MICROgram(s)/kG/Min (2.946 mL/Hr) IV Continuous <Continuous>    MEDICATIONS  (PRN):  acetaminophen   Tablet .. 650 milliGRAM(s) Oral every 6 hours PRN Mild Pain (1 - 3)  dextrose 40% Gel 15 Gram(s) Oral once PRN Blood Glucose LESS THAN 70 milliGRAM(s)/deciliter  glucagon  Injectable 1 milliGRAM(s) IntraMuscular once PRN Glucose LESS THAN 70 milligrams/deciliter  ondansetron Injectable 4 milliGRAM(s) IV Push every 6 hours PRN Nausea and/or Vomiting  sodium chloride 0.9% lock flush 10 milliLiter(s) IV Push every 1 hour PRN Pre/post blood products, medications, blood draw, and to maintain line patency      PHYSICAL EXAM:    Vital Signs Last 24 Hrs  T(C): 37.2 (29 Jul 2019 13:00), Max: 37.2 (29 Jul 2019 13:00)  T(F): 98.9 (29 Jul 2019 13:00), Max: 98.9 (29 Jul 2019 13:00)  HR: 64 (29 Jul 2019 16:00) (63 - 140)  BP: 112/62 (29 Jul 2019 16:00) (90/50 - 244/160)  BP(mean): 77 (29 Jul 2019 16:00) (59 - 191)  RR: 14 (29 Jul 2019 16:00) (11 - 28)  SpO2: 96% (29 Jul 2019 16:00) (92% - 99%)    General:  unarousable     HEENT:  ET tube  Lungs: comfortable  CV: normal   GI:   distended   :   redmond  Musculoskeletal:  bedbound  Skin: normal   Neuro: comatose  Oral intake ability: unable  Diet: [NPO]    LABS:                        8.9    8.28  )-----------( 148      ( 29 Jul 2019 06:35 )             27.5     07-29    143  |  107  |  36<H>  ----------------------------<  145<H>  3.4<L>   |  29  |  1.52<H>    Ca    8.4      29 Jul 2019 06:35  Phos  3.8     07-29  Mg     1.8     07-29    TPro  5.6<L>  /  Alb  2.0<L>  /  TBili  0.7  /  DBili  x   /  AST  19  /  ALT  14  /  AlkPhos  48  07-29        RADIOLOGY & ADDITIONAL STUDIES:  < from: Xray Chest 1 View- PORTABLE-Routine (07.29.19 @ 09:04) >  EXAM:  XR CHEST PORTABLE ROUTINE 1V      PROCEDURE DATE:  07/29/2019        INTERPRETATION:  AP chest on July 29, 2019 at 8:24 AM. Patient requires   intubation.    Heart is magnified by technique.    Endotracheal tube, nasogastric tube, right jugular line remain.    On July 28 there is a small left base pleural pulmonary reaction that   shows improvement on present study with slight residual.    IMPRESSION: Improved left base reaction.    < end of copied text >  < from: CT Head No Cont (07.25.19 @ 14:05) >    EXAM:  CT BRAIN      PROCEDURE DATE:  07/25/2019        INTERPRETATION:  CLINICAL INFORMATION: ams. . .    TECHNIQUE: Sequential axial images were obtained from the vertex to the   skull base without intravenous contrast. Coronal and sagittal   reformations were obtained.     COMPARISON: MRI brain 5/29/2013.    FINDINGS:    Images are limited by artifact.    There is no acute intracranial hemorrhage or mass effect. There is   cerebral volume loss.    There is no extraaxial fluid collection.     There is no displaced calvarial fracture. The visualized orbits are   within normal limits. Polyp or retention cyst in the left posterior   ethmoid sinus. Right mastoid air cells are underpneumatized. Partially   opacified left mastoid air cells.    IMPRESSION: No acute intracranial hemorrhage or mass effect.     < end of copied text >    ADVANCE DIRECTIVES: No MOLST.  Call placed to [facility].  Advanced Care Planning discussion total time spent:  1 hour

## 2019-07-29 NOTE — PROGRESS NOTE ADULT - SUBJECTIVE AND OBJECTIVE BOX
Follow-up Critical Care Progress Note  Chief Complaint : Altered mental status        No new events overnight.  Denies SOB/CP.       Allergies :No Known Allergies      PAST MEDICAL & SURGICAL HISTORY:  Gout  Hypertension  Dementia  H/O inguinal hernia repair      Medications:  MEDICATIONS  (STANDING):  chlorhexidine 0.12% Liquid 15 milliLiter(s) Oral Mucosa every 12 hours  chlorhexidine 4% Liquid 1 Application(s) Topical <User Schedule>  dexmedetomidine Infusion 0.2 MICROgram(s)/kG/Hr (4.91 mL/Hr) IV Continuous <Continuous>  dextrose 5%. 1000 milliLiter(s) (50 mL/Hr) IV Continuous <Continuous>  dextrose 50% Injectable 12.5 Gram(s) IV Push once  dextrose 50% Injectable 25 Gram(s) IV Push once  dextrose 50% Injectable 25 Gram(s) IV Push once  insulin lispro (HumaLOG) corrective regimen sliding scale   SubCutaneous every 6 hours  lactated ringers. 1000 milliLiter(s) (125 mL/Hr) IV Continuous <Continuous>  norepinephrine Infusion 0.05 MICROgram(s)/kG/Min (9.206 mL/Hr) IV Continuous <Continuous>  pantoprazole  Injectable 40 milliGRAM(s) IV Push two times a day  piperacillin/tazobactam IVPB.. 3.375 Gram(s) IV Intermittent every 8 hours  propofol Infusion 5 MICROgram(s)/kG/Min (2.946 mL/Hr) IV Continuous <Continuous>    MEDICATIONS  (PRN):  acetaminophen   Tablet .. 650 milliGRAM(s) Oral every 6 hours PRN Mild Pain (1 - 3)  dextrose 40% Gel 15 Gram(s) Oral once PRN Blood Glucose LESS THAN 70 milliGRAM(s)/deciliter  glucagon  Injectable 1 milliGRAM(s) IntraMuscular once PRN Glucose LESS THAN 70 milligrams/deciliter  ondansetron Injectable 4 milliGRAM(s) IV Push every 6 hours PRN Nausea and/or Vomiting  sodium chloride 0.9% lock flush 10 milliLiter(s) IV Push every 1 hour PRN Pre/post blood products, medications, blood draw, and to maintain line patency      LABS:                        8.9    8.28  )-----------( 148      ( 2019 06:35 )             27.5     07-29    143  |  107  |  36<H>  ----------------------------<  145<H>  3.4<L>   |  29  |  1.52<H>    Ca    8.4      2019 06:35  Phos  3.8       Mg     1.8         TPro  5.6<L>  /  Alb  2.0<L>  /  TBili  0.7  /  DBili  x   /  AST  19  /  ALT  14  /  AlkPhos  48        CARDIAC MARKERS ( 2019 05:00 )  .027 ng/mL / x     / x     / x     / x      CARDIAC MARKERS ( 2019 23:00 )  .050 ng/mL / x     / 236 U/L / x     / 4.0 ng/mL  CARDIAC MARKERS ( 2019 18:25 )  .058 ng/mL / x     / x     / x     / x      CARDIAC MARKERS ( 2019 14:50 )  .053 ng/mL / x     / x     / x     / x      CARDIAC MARKERS ( 2019 10:50 )  <.017 ng/mL / x     / x     / x     / x            PT/INR - ( 2019 06:35 )   PT: 14.7 sec;   INR: 1.30 ratio           Urinalysis Basic - ( 2019 12:13 )    Color: Yellow / Appearance: Clear / S.020 / pH: x  Gluc: x / Ketone: Negative  / Bili: Negative / Urobili: Negative   Blood: x / Protein: 15 / Nitrite: Negative   Leuk Esterase: Negative / RBC: 5-10 /HPF / WBC 0-2 /HPF   Sq Epi: x / Non Sq Epi: Neg.-Few / Bacteria: Few /HPF              CULTURES: (if applicable)    Culture - Blood (collected 19 @ 12:13)  Source: .Blood Blood  Preliminary Report (19 @ 20:00):    No growth to date.    Culture - Blood (collected 19 @ 12:13)  Source: .Blood Blood  Gram Stain (19 @ 08:04):    Growth in aerobic bottle: Gram Positive Cocci in Pairs and Chains  Preliminary Report (19 @ 08:03):    Growth in aerobic bottle: Gram Positive Cocci in Pairs and Chains    "Due to technical problems, Proteus sp. will Not be reported as part of    the BCID panel until further notice"    ***Blood Panel PCR results on this specimen are available    approximately 3 hours after the Gram stain result.***    Gram stain, PCR, and/or culture results may not always    correspond due to difference in methodologies.    ************************************************************    This PCR assay was performed using [employer].    The following targets are tested for: Enterococcus,    vancomycin resistant enterococci, Listeria monocytogenes,    coagulase negative staphylococci, S. aureus,    methicillin resistant S. aureus, Streptococcus agalactiae    (Group B), S. pneumoniae, S. pyogenes (Group A),    Acinetobacter baumannii, Enterobacter cloacae, E. coli,    Klebsiella oxytoca, K. pneumoniae, Proteus sp.,    Serratia marcescens, Haemophilus influenzae,    Neisseria meningitidis, Pseudomonas aeruginosa, Candida    albicans, C.glabrata, C krusei, C parapsilosis,    C. tropicalis and the KPC resistance gene.  Organism: Blood Culture PCR (19 @ 10:37)  Organism: Blood Culture PCR (19 @ 10:37)      -  Streptococcus sp. (Not Grp A, B or S pneumoniae): Detec      Method Type: PCR    Culture - Urine (collected 19 @ 12:13)  Source: .Urine Clean Catch (Midstream)  Final Report (19 @ 12:52):    No growth    Culture - Sputum (collected 19 @ 12:05)  Source: .Sputum Sputum  Gram Stain (19 @ 22:50):    Moderate polymorphonuclear leukocytes per low power field    Few Squamous epithelial cells per low power field    Moderate Yeast like cells per oil power field    Moderate Gram Positive Cocci in Pairs and Chains per oil power field  Preliminary Report (19 @ 20:50):    Normal Respiratory Jenny present    Culture - Urine (collected 19 @ 12:30)  Source: .Urine Clean Catch (Midstream)  Final Report (19 @ 12:45):    No growth    Culture - Blood (collected 19 @ 11:20)  Source: .Blood Blood-Peripheral  Preliminary Report (19 @ 16:01):    No growth to date.    Culture - Blood (collected 19 @ 11:20)  Source: .Blood Blood-Peripheral  Preliminary Report (19 @ 16:01):    No growth to date.    Culture - Blood (collected 19 @ 17:46)  Source: .Blood Blood-Peripheral  Final Report (19 @ 02:00):    No growth at 5 days.    Culture - Blood (collected 19 @ 17:46)  Source: .Blood Blood-Peripheral  Final Report (19 @ 02:00):    No growth at 5 days.          ABG - ( 2019 12:52 )  pH, Arterial: 7.40  pH, Blood: x     /  pCO2: 34    /  pO2: 84    / HCO3: x     / Base Excess: x     /  SaO2: 95                CAPILLARY BLOOD GLUCOSE      POCT Blood Glucose.: 149 mg/dL (2019 06:32)      RADIOLOGY  CXR:      CT:    ECHO:      VITALS:  T(C): 37.1 (19 @ 06:00), Max: 37.1 (19 @ 15:00)  T(F): 98.8 (19 @ 06:00), Max: 98.8 (19 @ 15:00)  HR: 92 (19 @ 08:00) (62 - 92)  BP: 157/70 (19 @ 08:00) (114/77 - 207/180)  BP(mean): 88 (19 @ 08:00) (71 - 191)  ABP: --  ABP(mean): --  RR: 12 (19 @ 08:00) (11 - 25)  SpO2: 98% (19 @ 08:00) (94% - 100%)  CVP(mm Hg): --  CVP(cm H2O): --    Ins and Outs     19 @ 07:01  -  19 @ 07:00  --------------------------------------------------------  IN: 3127.8 mL / OUT: 2395 mL / NET: 732.8 mL    19 @ 07:01  -  19 @ 08:14  --------------------------------------------------------  IN: 129 mL / OUT: 30 mL / NET: 99 mL            Device: [id number], Mode: CPAP with PS, FiO2: 30, PEEP: 5, PS: 5, ITime: 1, MAP: 8, PIP: 10    I&O's Detail    2019 07:  -  2019 07:00  --------------------------------------------------------  IN:    dexmedetomidine Infusion: 196.2 mL    lactated ringers.: 2000 mL    lactated ringers.: 750 mL    norepinephrine Infusion: 6.6 mL    Solution: 175 mL  Total IN: 3127.8 mL    OUT:    Indwelling Catheter - Urethral: 2345 mL    Nasoenteral Tube: 50 mL  Total OUT: 2395 mL    Total NET: 732.8 mL      2019 07: 08:14  --------------------------------------------------------  IN:    dexmedetomidine Infusion: 4 mL    lactated ringers.: 125 mL  Total IN: 129 mL    OUT:    Indwelling Catheter - Urethral: 30 mL  Total OUT: 30 mL    Total NET: 99 mL Follow-up Critical Care Progress Note  Chief Complaint : Altered mental status    patient on mechanical ventilation  cpap done ps 5, fio2 30%, tolerating  d/w GI plan for EGD ~ 1130 AM   comfortable  no secretions.         Allergies :No Known Allergies      PAST MEDICAL & SURGICAL HISTORY:  Gout  Hypertension  Dementia  H/O inguinal hernia repair      Medications:  MEDICATIONS  (STANDING):  chlorhexidine 0.12% Liquid 15 milliLiter(s) Oral Mucosa every 12 hours  chlorhexidine 4% Liquid 1 Application(s) Topical <User Schedule>  dexmedetomidine Infusion 0.2 MICROgram(s)/kG/Hr (4.91 mL/Hr) IV Continuous <Continuous>  dextrose 5%. 1000 milliLiter(s) (50 mL/Hr) IV Continuous <Continuous>  dextrose 50% Injectable 12.5 Gram(s) IV Push once  dextrose 50% Injectable 25 Gram(s) IV Push once  dextrose 50% Injectable 25 Gram(s) IV Push once  insulin lispro (HumaLOG) corrective regimen sliding scale   SubCutaneous every 6 hours  lactated ringers. 1000 milliLiter(s) (125 mL/Hr) IV Continuous <Continuous>  norepinephrine Infusion 0.05 MICROgram(s)/kG/Min (9.206 mL/Hr) IV Continuous <Continuous>  off yesterday  pantoprazole  Injectable 40 milliGRAM(s) IV Push two times a day  piperacillin/tazobactam IVPB.. 3.375 Gram(s) IV Intermittent every 8 hours  propofol Infusion 5 MICROgram(s)/kG/Min (2.946 mL/Hr) IV Continuous <Continuous>    MEDICATIONS  (PRN):  acetaminophen   Tablet .. 650 milliGRAM(s) Oral every 6 hours PRN Mild Pain (1 - 3)  dextrose 40% Gel 15 Gram(s) Oral once PRN Blood Glucose LESS THAN 70 milliGRAM(s)/deciliter  glucagon  Injectable 1 milliGRAM(s) IntraMuscular once PRN Glucose LESS THAN 70 milligrams/deciliter  ondansetron Injectable 4 milliGRAM(s) IV Push every 6 hours PRN Nausea and/or Vomiting  sodium chloride 0.9% lock flush 10 milliLiter(s) IV Push every 1 hour PRN Pre/post blood products, medications, blood draw, and to maintain line patency      LABS:                        8.9    8.28  )-----------( 148      ( 2019 06:35 )             27.5     07-29    143  |  107  |  36<H>  ----------------------------<  145<H>  3.4<L>   |  29  |  1.52<H>    Ca    8.4      2019 06:35  Phos  3.8       Mg     1.8         TPro  5.6<L>  /  Alb  2.0<L>  /  TBili  0.7  /  DBili  x   /  AST  19  /  ALT  14  /  AlkPhos  48        CARDIAC MARKERS ( 2019 05:00 )  .027 ng/mL / x     / x     / x     / x      CARDIAC MARKERS ( 2019 23:00 )  .050 ng/mL / x     / 236 U/L / x     / 4.0 ng/mL  CARDIAC MARKERS ( 2019 18:25 )  .058 ng/mL / x     / x     / x     / x      CARDIAC MARKERS ( 2019 14:50 )  .053 ng/mL / x     / x     / x     / x      CARDIAC MARKERS ( 2019 10:50 )  <.017 ng/mL / x     / x     / x     / x            PT/INR - ( 2019 06:35 )   PT: 14.7 sec;   INR: 1.30 ratio           Urinalysis Basic - ( 2019 12:13 )    Color: Yellow / Appearance: Clear / S.020 / pH: x  Gluc: x / Ketone: Negative  / Bili: Negative / Urobili: Negative   Blood: x / Protein: 15 / Nitrite: Negative   Leuk Esterase: Negative / RBC: 5-10 /HPF / WBC 0-2 /HPF   Sq Epi: x / Non Sq Epi: Neg.-Few / Bacteria: Few /HPF        H/H Trend  19 @ 06:35   -  8.9<L> / 27.5<L>  19 @ 12:30   -  9.1<L> / 28.1<L>  19 @ 05:00   -  9.4<L> / 28.6<L>  19 @ 23:00   -  9.4<L> / 28.4<L>  19 @ 18:25   -  10.4<L> / 31.4<L>  19 @ 10:50   -  12.7<L> / 38.6<L>    Trend Bun/Cr  19 @ 06:35  BUN/CR -  36<H> / 1.52<H>  19 @ 12:30  BUN/CR -  56<H> / 2.07<H>  19 @ 05:00  BUN/CR -  64<H> / 2.50<H>  19 @ 18:25  BUN/CR -  70<H> / 3.27<H>  19 @ 10:50  BUN/CR -  69<H> / 3.79<H>  19 @ 06:15  BUN/CR -  52<H> / 3.14<H>    Lactic Acid Trend  19 @ 18:24   -   1.5  19 @ 14:33   -   1.9  19 @ 12:05   -   2.5<H>  19 @ 11:20   -   1.3        CULTURES: (if applicable)    Culture - Blood (collected 19 @ 12:13)  Source: .Blood Blood  Preliminary Report (19 @ 20:00):    No growth to date.    Culture - Blood (collected 19 @ 12:13)  Source: .Blood Blood  Gram Stain (19 @ 08:04):    Growth in aerobic bottle: Gram Positive Cocci in Pairs and Chains  Preliminary Report (19 @ 08:03):    Growth in aerobic bottle: Gram Positive Cocci in Pairs and Chains    "Due to technical problems, Proteus sp. will Not be reported as part of    the BCID panel until further notice"    ***Blood Panel PCR results on this specimen are available    approximately 3 hours after the Gram stain result.***    Gram stain, PCR, and/or culture results may not always    correspond due to difference in methodologies.    ************************************************************    This PCR assay was performed using [employer].    The following targets are tested for: Enterococcus,    vancomycin resistant enterococci, Listeria monocytogenes,    coagulase negative staphylococci, S. aureus,    methicillin resistant S. aureus, Streptococcus agalactiae    (Group B), S. pneumoniae, S. pyogenes (Group A),    Acinetobacter baumannii, Enterobacter cloacae, E. coli,    Klebsiella oxytoca, K. pneumoniae, Proteus sp.,    Serratia marcescens, Haemophilus influenzae,    Neisseria meningitidis, Pseudomonas aeruginosa, Candida    albicans, C.glabrata, C krusei, C parapsilosis,    C. tropicalis and the KPC resistance gene.  Organism: Blood Culture PCR (19 @ 10:37)  Organism: Blood Culture PCR (19 @ 10:37)      -  Streptococcus sp. (Not Grp A, B or S pneumoniae): Detec      Method Type: PCR    Culture - Urine (collected 19 @ 12:13)  Source: .Urine Clean Catch (Midstream)  Final Report (19 @ 12:52):    No growth    Culture - Sputum (collected 19 @ 12:05)  Source: .Sputum Sputum  Gram Stain (19 @ 22:50):    Moderate polymorphonuclear leukocytes per low power field    Few Squamous epithelial cells per low power field    Moderate Yeast like cells per oil power field    Moderate Gram Positive Cocci in Pairs and Chains per oil power field  Preliminary Report (19 @ 20:50):    Normal Respiratory Jenny present    Culture - Urine (collected 19 @ 12:30)  Source: .Urine Clean Catch (Midstream)  Final Report (19 @ 12:45):    No growth    Culture - Blood (collected 19 @ 11:20)  Source: .Blood Blood-Peripheral  Preliminary Report (19 @ 16:01):    No growth to date.    Culture - Blood (collected 19 @ 11:20)  Source: .Blood Blood-Peripheral  Preliminary Report (19 @ 16:01):    No growth to date.    Culture - Blood (collected 19 @ 17:46)  Source: .Blood Blood-Peripheral  Final Report (19 @ 02:00):    No growth at 5 days.    Culture - Blood (collected 19 @ 17:46)  Source: .Blood Blood-Peripheral  Final Report (19 @ 02:00):    No growth at 5 days.          ABG - ( 2019 12:52 )  pH, Arterial: 7.40  pH, Blood: x     /  pCO2: 34    /  pO2: 84    / HCO3: x     / Base Excess: x     /  SaO2: 95                CAPILLARY BLOOD GLUCOSE      POCT Blood Glucose.: 149 mg/dL (2019 06:32)      RADIOLOGY  CXR:    < from: Xray Chest 1 View- PORTABLE-Routine (19 @ 05:59) >    Single AP view submitted.  Patient is rotated.    Endotracheal tube is present, tip approximately 2 cm above the level of   evelyne.  Nasogastric tube is present, tip below the level of the left   hemidiaphragm.    Right internal jugular central venous catheter is unchanged in position.    The evaluation of the cardiomediastinal silhouette is limited on portable   technique.    There are low lung volumes resulting in crowding of the bronchovascular   markings at the lung bases.  There is a possible small left-sided pleural effusion and underlying   retrocardiac airspace consolidation.  No pneumothorax is noted.    Impression:    Findings as discussed above.    < end of copied text >    CT:  < from: CT Head No Cont (19 @ 14:05) >  IMPRESSION: No acute intracranial hemorrhage or mass effect.     < end of copied text >      ECHO:  < from: TTE Echo Complete w/Doppler (19 @ 08:13) >    Summary:   1. Left ventricular ejection fraction, by visual estimation, is 55 to   60%.   2. Normal global leftventricular systolic function.   3. Normal left ventricular size and wall thicknesses, with normal   systolic and diastolic function.   4. Spectral Doppler shows impaired relaxation pattern of left   ventricular myocardial filling (Grade I diastolic dysfunction).   5. Normal right ventricular size and function.   6. Normal right atrial size.   7. The right atrium is normal in size.   8. There is no evidence of pericardial effusion.   9. Thickening and calcification of the posterior mitral valve leaflet.  10. Structurally normal pulmonic valve, with normal leaflet excursion.      < end of copied text >      VITALS:  T(C): 37.1 (19 @ 06:00), Max: 37.1 (19 @ 15:00)  T(F): 98.8 (19 @ 06:00), Max: 98.8 (19 @ 15:00)  HR: 92 (19 @ 08:00) (62 - 92)  BP: 157/70 (19 @ 08:00) (114/77 - 207/180)  BP(mean): 88 (19 @ 08:00) (71 - 191)  ABP: --  ABP(mean): --  RR: 12 (19 @ 08:00) (11 - 25)  SpO2: 98% (19 @ 08:00) (94% - 100%)  CVP(mm Hg): --  CVP(cm H2O): --    Ins and Outs     19 @ 07:01  -  19 @ 07:00  --------------------------------------------------------  IN: 3127.8 mL / OUT: 2395 mL / NET: 732.8 mL    19 @ 07:  -  19 @ 08:14  --------------------------------------------------------  IN: 129 mL / OUT: 30 mL / NET: 99 mL            Device: 840, Mode: CPAP with PS, FiO2: 30, PEEP: 5, PS: 5, ITime: 1, MAP: 8, PIP: 10    I&O's Detail    2019 07:  -  2019 07:00  --------------------------------------------------------  IN:    dexmedetomidine Infusion: 196.2 mL    lactated ringers.: 2000 mL    lactated ringers.: 750 mL    norepinephrine Infusion: 6.6 mL    Solution: 175 mL  Total IN: 3127.8 mL    OUT:    Indwelling Catheter - Urethral: 2345 mL    Nasoenteral Tube: 50 mL  Total OUT: 2395 mL    Total NET: 732.8 mL      2019 07:  -  2019 08:14  --------------------------------------------------------  IN:    dexmedetomidine Infusion: 4 mL    lactated ringers.: 125 mL  Total IN: 129 mL    OUT:    Indwelling Catheter - Urethral: 30 mL  Total OUT: 30 mL    Total NET: 99 mL

## 2019-07-29 NOTE — CONSULT NOTE ADULT - SUBJECTIVE AND OBJECTIVE BOX
HPI:   Patient is a 78y male with dementia and gout, developed painful knee and ambulation hence prescribed a course of steroids for presumed gout. He then became increasingly agitated hence taken to hospital 4 days ago. 2 days ago he became very lethargic with labored breathing, has an rrt, developed coffee ground emesis. He required intubation and vent. Vanco zosyn commenced for aspiration pneumonia. We are called. Patient cannot give any information due to sedation on vent.   He had just a low grade temp  REVIEW OF SYSTEMS:  All other review of systems cannot get negative (Comprehensive ROS)    PAST MEDICAL & SURGICAL HISTORY:  Gout  Hypertension  Dementia  H/O inguinal hernia repair      Allergies    No Known Allergies    Intolerances        Antimicrobials Day #  :3  piperacillin/tazobactam IVPB.. 3.375 Gram(s) IV Intermittent every 8 hours  vancomycin  IVPB 750 milliGRAM(s) IV Intermittent every 12 hours  vancomycin  IVPB        Other Medications:  acetaminophen   Tablet .. 650 milliGRAM(s) Oral every 6 hours PRN  chlorhexidine 0.12% Liquid 15 milliLiter(s) Oral Mucosa every 12 hours  chlorhexidine 4% Liquid 1 Application(s) Topical <User Schedule>  dexmedetomidine Infusion 0.2 MICROgram(s)/kG/Hr IV Continuous <Continuous>  dextrose 40% Gel 15 Gram(s) Oral once PRN  dextrose 5%. 1000 milliLiter(s) IV Continuous <Continuous>  dextrose 50% Injectable 12.5 Gram(s) IV Push once  dextrose 50% Injectable 25 Gram(s) IV Push once  dextrose 50% Injectable 25 Gram(s) IV Push once  glucagon  Injectable 1 milliGRAM(s) IntraMuscular once PRN  insulin lispro (HumaLOG) corrective regimen sliding scale   SubCutaneous every 6 hours  lactated ringers. 1000 milliLiter(s) IV Continuous <Continuous>  ondansetron Injectable 4 milliGRAM(s) IV Push every 6 hours PRN  pantoprazole  Injectable 40 milliGRAM(s) IV Push two times a day  propofol Infusion 5 MICROgram(s)/kG/Min IV Continuous <Continuous>  sodium chloride 0.9% lock flush 10 milliLiter(s) IV Push every 1 hour PRN      FAMILY HISTORY:  Family history of dementia: Mother  FHx: diabetes mellitus      SOCIAL HISTORY:  Smoking: [ ]Yes [x ]No  ETOH: [ ]Yes [x ]No  Drug Use: [ ]Yes [ x]No   [ xd] Single[ ]    T(F): 98.6 (07-29-19 @ 09:00), Max: 98.8 (07-28-19 @ 15:00)  HR: 70 (07-29-19 @ 12:00)  BP: 119/56 (07-29-19 @ 12:00)  RR: 16 (07-29-19 @ 12:00)  SpO2: 95% (07-29-19 @ 12:00)  Wt(kg): --    PHYSICAL EXAM:  General: sedated on vent, ETT, NGT,, no acute distress  Eyes:  anicteric, no conjunctival injection, no discharge  Oropharynx: no lesions or injection 	  Neck: supple, without adenopathy  Lungs: clear to auscultation  Heart: irregular rate and rhythm; no murmur, rubs or gallops  Abdomen: soft, distended, nontender, without mass or organomegaly  Skin: no lesions  Extremities: no clubbing, cyanosis, or edema  Neurologic: sedated on vent  scrotum no swell   LAB RESULTS:                        8.9    8.28  )-----------( 148      ( 29 Jul 2019 06:35 )             27.5     07-29    143  |  107  |  36<H>  ----------------------------<  145<H>  3.4<L>   |  29  |  1.52<H>    Ca    8.4      29 Jul 2019 06:35  Phos  3.8     07-29  Mg     1.8     07-29    TPro  5.6<L>  /  Alb  2.0<L>  /  TBili  0.7  /  DBili  x   /  AST  19  /  ALT  14  /  AlkPhos  48  07-29    LIVER FUNCTIONS - ( 29 Jul 2019 06:35 )  Alb: 2.0 g/dL / Pro: 5.6 g/dL / ALK PHOS: 48 U/L / ALT: 14 U/L DA / AST: 19 U/L / GGT: x               MICROBIOLOGY:  RECENT CULTURES:  07-27 @ 12:13 .Urine Clean Catch (Midstream) Blood Culture PCR    No growth    Growth in aerobic bottle: Gram Positive Cocci in Pairs and Chains    07-27 @ 12:05 .Sputum Sputum     Normal Respiratory Nghia present    Moderate polymorphonuclear leukocytes per low power field  Few Squamous epithelial cells per low power field  Moderate Yeast like cells per oil power field  Moderate Gram Positive Cocci in Pairs and Chains per oil power field    07-25 @ 12:30 .Urine Clean Catch (Midstream)     No growth      07-25 @ 11:20 .Blood Blood-Peripheral     No growth to date.            RADIOLOGY REVIEWED:    < from: Xray Chest 1 View- PORTABLE-Routine (07.29.19 @ 09:04) >  EXAM:  XR CHEST PORTABLE ROUTINE 1V      PROCEDURE DATE:  07/29/2019        INTERPRETATION:  AP chest on July 29, 2019 at 8:24 AM. Patient requires   intubation.    Heart is magnified by technique.    Endotracheal tube, nasogastric tube, right jugular line remain.    On July 28 there is a small left base pleural pulmonary reaction that   shows improvement on present study with slight residual.    IMPRESSION: Improved left base reaction.      < end of copied text >    < from: Xray Abdomen 2 Views (07.26.19 @ 14:37) >  XAM:  XR ABDOMEN 2V      PROCEDURE DATE:  07/26/2019        INTERPRETATION:  2 view abdomen    History abdominal distention and pain    Diffuse small bowel and colonic gas is noted without significant   distention. There is no gastric distention or free air, mass effect or   abnormal calcification. There is a mild amount of retained fecal material   in the ascending colon and cecum. There are no air-fluid levels on the   upright radiograph.    IMPRESSION:    Mild adynamic distention    < end of copied text >      Impression:  Elderly man with dementia admitted for increase in agitation and decline in function. Developed respiratory distress and distended abdomen, vomiting coffee ground emesis consistent with some upper gi bleed now on vent. Suspect aspiration pneumonia from gi bleed and vomiting.     Recommendations: will tailor to ceftriaxone for aspiration pneumonia since sputum with upper respiratory nghia  continue supportive care per ccu  pulmonary toilet  gi w/u for bleeding  ct abd and pelvis if gi agrees

## 2019-07-30 LAB
ANION GAP SERPL CALC-SCNC: 6 MMOL/L — SIGNIFICANT CHANGE UP (ref 5–17)
BUN SERPL-MCNC: 29 MG/DL — HIGH (ref 7–23)
CALCIUM SERPL-MCNC: 8.1 MG/DL — LOW (ref 8.4–10.5)
CHLORIDE SERPL-SCNC: 108 MMOL/L — SIGNIFICANT CHANGE UP (ref 96–108)
CO2 BLDA-SCNC: 26 MMOL/L — SIGNIFICANT CHANGE UP (ref 22–30)
CO2 SERPL-SCNC: 30 MMOL/L — SIGNIFICANT CHANGE UP (ref 22–31)
CREAT SERPL-MCNC: 1.37 MG/DL — HIGH (ref 0.5–1.3)
CULTURE RESULTS: SIGNIFICANT CHANGE UP
CULTURE RESULTS: SIGNIFICANT CHANGE UP
GAS PNL BLDA: SIGNIFICANT CHANGE UP
GLUCOSE BLDC GLUCOMTR-MCNC: 120 MG/DL — HIGH (ref 70–99)
GLUCOSE BLDC GLUCOMTR-MCNC: 137 MG/DL — HIGH (ref 70–99)
GLUCOSE SERPL-MCNC: 131 MG/DL — HIGH (ref 70–99)
HCT VFR BLD CALC: 27.8 % — LOW (ref 39–50)
HGB BLD-MCNC: 8.8 G/DL — LOW (ref 13–17)
HOROWITZ INDEX BLDA+IHG-RTO: SIGNIFICANT CHANGE UP
MAGNESIUM SERPL-MCNC: 1.8 MG/DL — SIGNIFICANT CHANGE UP (ref 1.6–2.6)
MCHC RBC-ENTMCNC: 28.9 PG — SIGNIFICANT CHANGE UP (ref 27–34)
MCHC RBC-ENTMCNC: 31.7 GM/DL — LOW (ref 32–36)
MCV RBC AUTO: 91.4 FL — SIGNIFICANT CHANGE UP (ref 80–100)
NRBC # BLD: 0 /100 WBCS — SIGNIFICANT CHANGE UP (ref 0–0)
PCO2 BLDA: 32 MMHG — SIGNIFICANT CHANGE UP (ref 32–46)
PH BLDA: 7.5 — HIGH (ref 7.35–7.45)
PHOSPHATE SERPL-MCNC: 3.5 MG/DL — SIGNIFICANT CHANGE UP (ref 2.5–4.5)
PLATELET # BLD AUTO: 144 K/UL — LOW (ref 150–400)
PO2 BLDA: 75 MMHG — SIGNIFICANT CHANGE UP (ref 74–108)
POTASSIUM SERPL-MCNC: 3.4 MMOL/L — LOW (ref 3.5–5.3)
POTASSIUM SERPL-SCNC: 3.4 MMOL/L — LOW (ref 3.5–5.3)
RBC # BLD: 3.04 M/UL — LOW (ref 4.2–5.8)
RBC # FLD: 13 % — SIGNIFICANT CHANGE UP (ref 10.3–14.5)
SAO2 % BLDA: 95 % — SIGNIFICANT CHANGE UP (ref 92–96)
SODIUM SERPL-SCNC: 144 MMOL/L — SIGNIFICANT CHANGE UP (ref 135–145)
SPECIMEN SOURCE: SIGNIFICANT CHANGE UP
SPECIMEN SOURCE: SIGNIFICANT CHANGE UP
WBC # BLD: 9.18 K/UL — SIGNIFICANT CHANGE UP (ref 3.8–10.5)
WBC # FLD AUTO: 9.18 K/UL — SIGNIFICANT CHANGE UP (ref 3.8–10.5)

## 2019-07-30 PROCEDURE — 71045 X-RAY EXAM CHEST 1 VIEW: CPT | Mod: 26

## 2019-07-30 RX ORDER — INSULIN LISPRO 100/ML
VIAL (ML) SUBCUTANEOUS
Refills: 0 | Status: DISCONTINUED | OUTPATIENT
Start: 2019-07-30 | End: 2019-08-09

## 2019-07-30 RX ADMIN — CEFTRIAXONE 100 MILLIGRAM(S): 500 INJECTION, POWDER, FOR SOLUTION INTRAMUSCULAR; INTRAVENOUS at 14:45

## 2019-07-30 RX ADMIN — PANTOPRAZOLE SODIUM 10 MG/HR: 20 TABLET, DELAYED RELEASE ORAL at 15:32

## 2019-07-30 RX ADMIN — DEXMEDETOMIDINE HYDROCHLORIDE IN 0.9% SODIUM CHLORIDE 4.91 MICROGRAM(S)/KG/HR: 4 INJECTION INTRAVENOUS at 05:55

## 2019-07-30 RX ADMIN — Medication 650 MILLIGRAM(S): at 19:30

## 2019-07-30 RX ADMIN — DEXMEDETOMIDINE HYDROCHLORIDE IN 0.9% SODIUM CHLORIDE 4.91 MICROGRAM(S)/KG/HR: 4 INJECTION INTRAVENOUS at 04:46

## 2019-07-30 RX ADMIN — Medication 650 MILLIGRAM(S): at 13:14

## 2019-07-30 RX ADMIN — CHLORHEXIDINE GLUCONATE 15 MILLILITER(S): 213 SOLUTION TOPICAL at 04:47

## 2019-07-30 RX ADMIN — CHLORHEXIDINE GLUCONATE 1 APPLICATION(S): 213 SOLUTION TOPICAL at 05:54

## 2019-07-30 RX ADMIN — PANTOPRAZOLE SODIUM 10 MG/HR: 20 TABLET, DELAYED RELEASE ORAL at 05:53

## 2019-07-30 RX ADMIN — Medication 650 MILLIGRAM(S): at 14:14

## 2019-07-30 RX ADMIN — Medication 650 MILLIGRAM(S): at 20:00

## 2019-07-30 NOTE — PROGRESS NOTE ADULT - SUBJECTIVE AND OBJECTIVE BOX
78y  Male  No Known Allergies    Patient is a 78y old  Male who presents with a chief complaint of Worsening agitation and difficulty ambulating (30 Jul 2019 15:45)    HPI:  79 yo M with PMHx of Alzheimer Dementia, gout, HTN brought in by wife from home for declining mental status and difficulty ambulating x 1 week. History obtained from pt's wife at bedside. Pt has known dementia but over the past week, his wife has noticed that he had difficulty ambulating with swelling of both feet, decreased PO intake and worsening agitation.Pt was seen by his PCP, Dr. Adame who started treatment for gout and sent Rx for steroids but his wife has not picked up the prescription.  He has been taking Alprazolam for many years, but recently also prescribed Zolpidem at bedtime as needed for increased agitation. His wife also notices decrease in urine output, denies recent illness, falls, LOC, fevers, chills, nausea, vomiting, diarrhea (25 Jul 2019 16:55)    PAST MEDICAL & SURGICAL HISTORY:  Gout  Hypertension  Dementia  H/O inguinal hernia repair    FAMILY HISTORY:  Family history of dementia: Mother  FHx: diabetes mellitus      Vitals   Vital Signs Last 24 Hrs  T(C): 37.8 (30 Jul 2019 15:00), Max: 38.7 (30 Jul 2019 11:00)  T(F): 100 (30 Jul 2019 15:00), Max: 101.6 (30 Jul 2019 11:00)  HR: 107 (30 Jul 2019 18:00) (65 - 123)  BP: 164/94 (30 Jul 2019 18:00) (96/74 - 182/163)  BP(mean): 102 (30 Jul 2019 18:00) (81 - 171)  RR: 15 (30 Jul 2019 18:00) (10 - 21)  SpO2: 94% (30 Jul 2019 16:00) (94% - 100%)  ABG - ( 30 Jul 2019 10:20 )  pH, Arterial: 7.50  pH, Blood: x     /  pCO2: 32    /  pO2: 75    / HCO3: x     / Base Excess: x     /  SaO2: 95                I&O's Summary    29 Jul 2019 07:01  -  30 Jul 2019 07:00  --------------------------------------------------------  IN: 4196 mL / OUT: 1475 mL / NET: 2721 mL    30 Jul 2019 07:01  -  30 Jul 2019 19:53  --------------------------------------------------------  IN: 1375 mL / OUT: 2200 mL / NET: -825 mL        LABS  07-30    144  |  108  |  29<H>  ----------------------------<  131<H>  3.4<L>   |  30  |  1.37<H>    Ca    8.1<L>      30 Jul 2019 04:40  Phos  3.5     07-30  Mg     1.8     07-30    TPro  5.6<L>  /  Alb  2.0<L>  /  TBili  0.7  /  DBili  x   /  AST  19  /  ALT  14  /  AlkPhos  48  07-29                          8.8    9.18  )-----------( 144      ( 30 Jul 2019 04:40 )             27.8     PT/INR - ( 29 Jul 2019 06:35 )   PT: 14.7 sec;   INR: 1.30 ratio               LIVER FUNCTIONS - ( 29 Jul 2019 06:35 )  Alb: 2.0 g/dL / Pro: 5.6 g/dL / ALK PHOS: 48 U/L / ALT: 14 U/L DA / AST: 19 U/L / GGT: x           CAPILLARY BLOOD GLUCOSE      POCT Blood Glucose.: 120 mg/dL (30 Jul 2019 11:20)        VENT SETTINGS   Mode: PS (Pressure Support)/ Spontaneous  FiO2: 30  PEEP: 5  PS: 5  MAP: 8.1  PIP: 11      Meds  MEDICATIONS  (STANDING):  cefTRIAXone   IVPB 1000 milliGRAM(s) IV Intermittent every 24 hours  dextrose 5%. 1000 milliLiter(s) (50 mL/Hr) IV Continuous <Continuous>  dextrose 50% Injectable 12.5 Gram(s) IV Push once  dextrose 50% Injectable 25 Gram(s) IV Push once  dextrose 50% Injectable 25 Gram(s) IV Push once  insulin lispro (HumaLOG) corrective regimen sliding scale   SubCutaneous Before meals and at bedtime  lactated ringers. 1000 milliLiter(s) (125 mL/Hr) IV Continuous <Continuous>  pantoprazole Infusion 8 mG/Hr (10 mL/Hr) IV Continuous <Continuous>      REVIEW OF SYSTEMS:    Unable to obtain due to mental status       Physicial Exam:     Constitutional: NAD, well-groomed, well-developed  HEENT: PERRLA, EOMI, no drainage or redness  Neck: No bruits; no thyromegaly or nodules,  No JVD  Back: Normal spine flexure, No CVA tenderness, No deformity or limitation of movement  Respiratory: Breath Sounds equal & clear to percussion & auscultation, no accessory muscle use  Cardiovascular: Regular rate & rhythm, normal S1, S2; no murmurs, gallops or rubs; no S3, S4  Gastrointestinal: Soft, non-tender, non distended no hepatosplenomegaly, normal bowel sounds  Extremities: No peripheral edema, No cyanosis, clubbing   Vascular: Equal and normal pulses: 2+ peripheral pulses throughout  Neurological: GCS:    A&O x 3; no sensory, motor  deficits, normal reflexes  Psychiatric: Normal mood, normal affect  Musculoskeletal: No joint pain, swelling or deformity; no limitation of movement  Skin: No rashes 78y  Male  No Known Allergies    CC: Patient is a 78y old  Male who presents with a chief complaint of Worsening agitation and difficulty ambulating     HPI: 77 yo M with PMHx of Alzheimer Dementia, gout, HTN brought in by wife from home for declining mental status and difficulty ambulating x 1 week, decreased PO intake and worsening agitation. He was admitted but was a rapid response on the floor for respiratory distress, was found not managing his airway  with audible gurgling, tachycardic, hypoxia and subsequently intubated for respiratory failure jayde to apparent aspiration.     an ng tube was placed with 300 cc of dark colored vomitus obtained ( thought to be upper GI bleeding ) . He presented with elevated lactate level secondary to sepsis due from aspiration. He required IV pressors for hypotension for brief period but has been sustaining good MAP's off levophed.     Today he is S/P EGD from yesterday where a gastric ulcer seen with exposed vessel and chronic bleeding and 3 clips placed. Post procedure he was successfully extubated and remains extubated managing airway well. He is currently Day # 4  of antibiotics ( ceftriaxone ) and has been febrile but ID is aware and recommends treating with Tylenol and continuing antibiotics for at least 48 hrs.     PAST MEDICAL & SURGICAL HISTORY:  Gout  Hypertension  Dementia  H/O inguinal hernia repair    FAMILY HISTORY:  Family history of dementia: Mother  FHx: diabetes mellitus    Vital Signs Last 24 Hrs  T(C): 37.8 (30 Jul 2019 15:00), Max: 38.7 (30 Jul 2019 11:00)  T(F): 100 (30 Jul 2019 15:00), Max: 101.6 (30 Jul 2019 11:00)  HR: 107 (30 Jul 2019 18:00) (65 - 123)  BP: 164/94 (30 Jul 2019 18:00) (96/74 - 182/163)  BP(mean): 102 (30 Jul 2019 18:00) (81 - 171)  RR: 15 (30 Jul 2019 18:00) (10 - 21)  SpO2: 94% (30 Jul 2019 16:00) (94% - 100%)  ABG - ( 30 Jul 2019 10:20 )  pH, Arterial: 7.50  pH, Blood: x     /  pCO2: 32    /  pO2: 75    / HCO3: x     / Base Excess: x     /  SaO2: 95        I&O's Summary  29 Jul 2019 07:01  -  30 Jul 2019 07:00  --------------------------------------------------------  IN: 4196 mL / OUT: 1475 mL / NET: 2721 mL    30 Jul 2019 07:01  -  30 Jul 2019 19:53  --------------------------------------------------------  IN: 1375 mL / OUT: 2200 mL / NET: -825 mL      LABS  07-30    144  |  108  |  29<H>  ----------------------------<  131<H>  3.4<L>   |  30  |  1.37<H>    Ca    8.1<L>      30 Jul 2019 04:40  Phos  3.5     07-30  Mg     1.8     07-30  TPro  5.6<L>  /  Alb  2.0<L>  /  TBili  0.7  /  DBili  x   /  AST  19  /  ALT  14  /  AlkPhos  48  07-29                          8.8    9.18  )-----------( 144      ( 30 Jul 2019 04:40 )             27.8     PT/INR - ( 29 Jul 2019 06:35 )   PT: 14.7 sec;   INR: 1.30 ratio    LIVER FUNCTIONS - ( 29 Jul 2019 06:35 )  Alb: 2.0 g/dL / Pro: 5.6 g/dL / ALK PHOS: 48 U/L / ALT: 14 U/L DA / AST: 19 U/L / GGT: x           CAPILLARY BLOOD GLUCOSE  POCT Blood Glucose.: 120 mg/dL (30 Jul 2019 11:20)    MEDICATIONS  (STANDING):  cefTRIAXone   IVPB 1000 milliGRAM(s) IV Intermittent every 24 hours  dextrose 5%. 1000 milliLiter(s) (50 mL/Hr) IV Continuous <Continuous>  dextrose 50% Injectable 12.5 Gram(s) IV Push once  dextrose 50% Injectable 25 Gram(s) IV Push once  dextrose 50% Injectable 25 Gram(s) IV Push once  insulin lispro (HumaLOG) corrective regimen sliding scale   SubCutaneous Before meals and at bedtime  lactated ringers. 1000 milliLiter(s) (125 mL/Hr) IV Continuous <Continuous>  pantoprazole Infusion 8 mG/Hr (10 mL/Hr) IV Continuous <Continuous>      REVIEW OF SYSTEMS:  Unable to obtain due to mental status       Physicial Exam:     Constitutional: NAD, well-groomed, well-developed  HEENT: PERRLA, EOMI, no drainage or redness  Neck: No bruits; no thyromegaly or nodules,  No JVD  Back: Normal spine flexure, No CVA tenderness, No deformity or limitation of movement  Respiratory: Breath Sounds equal & clear to percussion & auscultation, no accessory muscle use  Cardiovascular: Regular rate & rhythm, normal S1, S2; no murmurs, gallops or rubs; no S3, S4  Gastrointestinal: Soft, non-tender, non distended no hepatosplenomegaly, normal bowel sounds  Extremities: No peripheral edema, No cyanosis, clubbing   Vascular: Equal and normal pulses: 2+ peripheral pulses throughout  Neurological: GCS:    A&O x 3; no sensory, motor  deficits, normal reflexes  Psychiatric: Normal mood, normal affect  Musculoskeletal: No joint pain, swelling or deformity; no limitation of movement  Skin: No rashes

## 2019-07-30 NOTE — PROGRESS NOTE ADULT - PROBLEM SELECTOR PLAN 2
continue to treat souuce  continue aggressive IV hydration  trend lactate
-possibly from pain from Osteoarthritis +/- Gout flare  -pain mgmt prn  -PT eval pending
Hold nephrotoxic meds  Continue aggressive hydration  Trend urine output  Follow up BUN/Creatinine  follow up electrolytes
-possibly from pain from Osteoarthritis +/- Gout flare  -pain mgmt prn  -PT eval pending

## 2019-07-30 NOTE — PROGRESS NOTE ADULT - PROBLEM SELECTOR PLAN 3
continue antibiotics  frequent suctioning   aspiration precautions
likely 2/2 to dehydration in setting of decreased PO intake but also agitation; pt pulled 2 IV lines; spoke w wife to encourage fluid intake, will reorder IVF  - check TSH
s/p 3 clips placed. No bleeding   continue clear liquid diet  continue PPI infusion   monitor cbc and signs of dark coffee ground emesis
likely 2/2 to dehydration in setting of decreased PO intake but also agitation; pt pulled 2 IV lines; spoke w wife to encourage fluid intake, will reorder IVF  - check TSH

## 2019-07-30 NOTE — PROGRESS NOTE ADULT - PROBLEM SELECTOR PROBLEM 2
Sepsis, due to unspecified organism
Ambulatory dysfunction
OLVIN (acute kidney injury)
Ambulatory dysfunction

## 2019-07-30 NOTE — PROGRESS NOTE ADULT - ASSESSMENT
Physical Exam on Sedation vacation and CPAP    GENERAL:               Alert,  No acute distress.  Orally intubated  HEENT:                    No JVD, dry MM  PULM:                     Bilateral air entry, Clear to auscultation bilaterally, no significant sputum production, No Rales, No Rhonchi, No Wheezing  CVS:                         S1, S2,  No Murmur  ABD:                        Soft, distended, nontender, normoactive bowel sounds,   EXT:                         No edema, nontender, No Cyanosis or Clubbing   Vascular:                Warm Extremities,   NEURO:                  Alert,  interactive, nonfocal, follows commands  PSYC:                      Calm,       Assessment  1. Acute respiratory failure Due to  Aspiration Pneumonitis   2. Severe Sepsis due to Aspiration PNA and Streptococcus bacteremia   3. Upper GIB with Acute blood loss anemia s/p EGD s/p Clip for visible vessel ulcer   4. Acute renal failure due to  Hypotension Resolved  5. Underlying Alzheimer's disease       Plan  Extubate to arsol mask as pt tolerating CPAP ps 5, peep 5, fio2 30 %    RR < 20 TV >400  Abx as per ID  GI f/u to advance diet  NPO  PPI  trend CBC    Family Updated: 		family, wife                                 Date:  7/29 -7/30      Sedation & Analgesia:	as above   Diet/Nutrition:		Advance as per GI  GI PPx:			PPI drip    DVT Ppx:		venodynes      Activity:		      Bedrest   Head of Bed:               35-45  Glycemic Control:        n/a    Lines:  CENTRAL LINE: 	[x ] YES [ ] NO	                    LOCATION:   	                       DATE INSERTED:   	                    REMOVE:  [x ] YES [ ] NO    A-LINE:  	                [ ] YES [ x] NO                      LOCATION:   	                       DATE INSERTED: 		            REMOVE:  [ ] YES [ ] NO    EDWARDS: 		        [ x] YES [ ] NO  		                                       DATE INSERTED:		            REMOVE:  [ x] YES [ ] NO      Restraints were deemed necessary to prevent removal of life-sustaining devices [x  ] YES   [    ]  NO    Disposition: ICU monitoring    Goals of Care: Full code Physical Exam on Sedation vacation and CPAP    GENERAL:               Alert,  No acute distress.  Orally intubated  HEENT:                    No JVD, dry MM  PULM:                     Bilateral air entry, Clear to auscultation bilaterally, no significant sputum production, No Rales, No Rhonchi, No Wheezing  CVS:                         S1, S2,  No Murmur  ABD:                        Soft, distended, nontender, normoactive bowel sounds,   EXT:                         No edema, nontender, No Cyanosis or Clubbing   Vascular:                Warm Extremities,   NEURO:                  Alert,  interactive, nonfocal, follows commands  PSYC:                      Calm,       Assessment  1. Acute respiratory failure Due to  Aspiration Pneumonitis   2. Severe Sepsis due to Aspiration PNA and Streptococcus bacteremia   3. Upper GIB with Acute blood loss anemia s/p EGD s/p Clip for visible vessel ulcer   4. Acute renal failure due to  Hypotension Resolved with baseline cr 1.2-1.4  5. Underlying Alzheimer's disease       Plan  Extubate to arsol mask as pt tolerating CPAP ps 5, peep 5, fio2 30 %    RR < 20 TV >400  Abx as per ID  GI f/u to advance diet  NPO  PPI  trend CBC    Family Updated: 		family, wife                                 Date:  7/29 -7/30      Sedation & Analgesia:	as above   Diet/Nutrition:		Advance as per GI  GI PPx:			PPI drip    DVT Ppx:		venodynes      Activity:		      Bedrest   Head of Bed:               35-45  Glycemic Control:        n/a    Lines:  CENTRAL LINE: 	[x ] YES [ ] NO	                    LOCATION:   	                       DATE INSERTED:   	                    REMOVE:  [x ] YES [ ] NO    A-LINE:  	                [ ] YES [ x] NO                      LOCATION:   	                       DATE INSERTED: 		            REMOVE:  [ ] YES [ ] NO    EDWARDS: 		        [ x] YES [ ] NO  		                                       DATE INSERTED:		            REMOVE:  [ x] YES [ ] NO      Restraints were deemed necessary to prevent removal of life-sustaining devices [x  ] YES   [    ]  NO    Disposition: ICU monitoring    Goals of Care: Full code

## 2019-07-30 NOTE — PROGRESS NOTE ADULT - ASSESSMENT
79 yo M, dementia, here with agitation, functional decline  Distention, coffee ground emesis, s/p EGD yest- clipped gastric ulcer  Febrile earlier, but extubated, alert, WBC normal, CXR clear  Difficult to correlate for infection  Sputum normal nghia only  Alpha Strep in single Bld Cx likely a contaminant    Plan:  Continue Ceftriaxone for now- anticipate limiting to another 48 hrs at most  Follow temps and CBC/diff   D/w ICU team

## 2019-07-30 NOTE — PROGRESS NOTE ADULT - SUBJECTIVE AND OBJECTIVE BOX
CC: f/u for pneumonia, resp failure    Patient extubated earlier, off pressors, awake, alert, tolerating clears    REVIEW OF SYSTEMS:  All other review of systems negative (Comprehensive ROS)    Antimicrobials Day # 4   cefTRIAXone   IVPB 1000 milliGRAM(s) IV Intermittent every 24 hours    Other Medications Reviewed    T(F): 101.6 (07-30-19 @ 11:00), Max: 101.6 (07-30-19 @ 11:00)  HR: 123 (07-30-19 @ 13:00)  BP: 163/66 (07-30-19 @ 13:00)  RR: 20 (07-30-19 @ 13:00)  SpO2: 94% (07-30-19 @ 13:00)  Wt(kg): --    PHYSICAL EXAM:  General: alert, no acute distress  Eyes:  anicteric, no conjunctival injection, no discharge  Oropharynx: no lesions or injection 	  Neck: RIJ site clean  Lungs: ant rhonchi  Heart: regular rate and rhythm; no murmur, rubs or gallops  Abdomen: soft, nondistended, nontender, without mass or organomegaly  Skin: no lesions  Extremities: no edema  Neurologic: alert, moves all extremities    LAB RESULTS:                        8.8    9.18  )-----------( 144      ( 30 Jul 2019 04:40 )             27.8     07-30    144  |  108  |  29<H>  ----------------------------<  131<H>  3.4<L>   |  30  |  1.37<H>    Ca    8.1<L>      30 Jul 2019 04:40  Phos  3.5     07-30  Mg     1.8     07-30    TPro  5.6<L>  /  Alb  2.0<L>  /  TBili  0.7  /  DBili  x   /  AST  19  /  ALT  14  /  AlkPhos  48  07-29    MICROBIOLOGY:  RECENT CULTURES:  07-27 @ 12:13 .Urine Clean Catch (Midstream) Blood Culture PCR    No growth    07-27 @ 12:05 .Sputum Sputum     Normal Respiratory Jenny present    Culture - Blood (07.27.19 @ 12:13)    Specimen Source: .Blood Blood    Culture Results:   No growth to date.    Culture - Blood (07.27.19 @ 12:13)    -  Streptococcus sp. (Not Grp A, B or S pneumoniae): Detec    Gram Stain:   Growth in aerobic bottle: Gram Positive Cocci in Pairs and Chains  Alpha hemolytic strep    RADIOLOGY REVIEWED:  Xray Chest 1 View- PORTABLE-Routine (07.30.19 @ 07:07) >  No evidence of acute pulmonary disease.

## 2019-07-30 NOTE — PROGRESS NOTE ADULT - SUBJECTIVE AND OBJECTIVE BOX
Follow-up Critical Care Progress Note  Chief Complaint : Altered mental status    s/p EGD yesterday with bleeding visibile vesicle   s/p clip   no overnihgt bleeding noted    this am placed on cpap, comfortable,   no cp, sob, palp, n/v    Allergies :No Known Allergies      PAST MEDICAL & SURGICAL HISTORY:  Gout  Hypertension  Dementia  H/O inguinal hernia repair      Medications:  MEDICATIONS  (STANDING):  cefTRIAXone   IVPB 1000 milliGRAM(s) IV Intermittent every 24 hours  chlorhexidine 4% Liquid 1 Application(s) Topical <User Schedule>  dexmedetomidine Infusion 0.2 MICROgram(s)/kG/Hr (4.91 mL/Hr) IV Continuous <Continuous>  dextrose 5%. 1000 milliLiter(s) (50 mL/Hr) IV Continuous <Continuous>  dextrose 50% Injectable 12.5 Gram(s) IV Push once  dextrose 50% Injectable 25 Gram(s) IV Push once  dextrose 50% Injectable 25 Gram(s) IV Push once  insulin lispro (HumaLOG) corrective regimen sliding scale   SubCutaneous every 6 hours  lactated ringers. 1000 milliLiter(s) (125 mL/Hr) IV Continuous <Continuous>  pantoprazole Infusion 8 mG/Hr (10 mL/Hr) IV Continuous <Continuous>  propofol Infusion 5 MICROgram(s)/kG/Min (2.946 mL/Hr) IV Continuous <Continuous>    MEDICATIONS  (PRN):  acetaminophen   Tablet .. 650 milliGRAM(s) Oral every 6 hours PRN Mild Pain (1 - 3)  ondansetron Injectable 4 milliGRAM(s) IV Push every 6 hours PRN Nausea and/or Vomiting  sodium chloride 0.9% lock flush 10 milliLiter(s) IV Push every 1 hour PRN Pre/post blood products, medications, blood draw, and to maintain line patency      LABS:                        8.8    9.18  )-----------( 144      ( 30 Jul 2019 04:40 )             27.8     07-30    144  |  108  |  29<H>  ----------------------------<  131<H>  3.4<L>   |  30  |  1.37<H>    Ca    8.1<L>      30 Jul 2019 04:40  Phos  3.5     07-30  Mg     1.8     07-30    TPro  5.6<L>  /  Alb  2.0<L>  /  TBili  0.7  /  DBili  x   /  AST  19  /  ALT  14  /  AlkPhos  48  07-29            PT/INR - ( 29 Jul 2019 06:35 )   PT: 14.7 sec;   INR: 1.30 ratio           CULTURES: (if applicable)    Culture - Blood (collected 07-27-19 @ 12:13)  Source: .Blood Blood  Preliminary Report (07-28-19 @ 20:00):    No growth to date.    Culture - Blood (collected 07-27-19 @ 12:13)  Source: .Blood Blood  Gram Stain (07-28-19 @ 08:04):    Growth in aerobic bottle: Gram Positive Cocci in Pairs and Chains  Final Report (07-29-19 @ 13:16):    Growth in aerobic bottle: Alpha hemolytic strep    (not Strep. pneumoniae or Enterococcus)    Single set isolate, possible contaminant. Contact    Microbiology if susceptibility testing clinically    indicated. Multiple Morphological Strains    "Due to technical problems, Proteus sp. will Not be reported as part of    the BCID panel until further notice"    ***Blood Panel PCR results on this specimen are available    approximately 3 hours after the Gram stain result.***    Gram stain, PCR, and/or culture resultsmay not always    correspond due to difference in methodologies.    ************************************************************    This PCR assay was performed using [employer].    The following targets are tested for: Enterococcus,    vancomycin resistant enterococci, Listeria monocytogenes,    coagulase negative staphylococci, S. aureus,    methicillin resistant S. aureus, Streptococcus agalactiae    (Group B), S. pneumoniae, S. pyogenes (Group A),    Acinetobacter baumannii, Enterobacter cloacae, E. coli,    Klebsiella oxytoca, K. pneumoniae, Proteus sp.,    Serratia marcescens, Haemophilus influenzae,    Neisseria meningitidis, Pseudomonas aeruginosa, Candida    albicans, C. glabrata, C krusei, C parapsilosis,    C. tropicalis and the KPC resistance gene.  Organism: Blood Culture PCR (07-29-19 @ 13:16)  Organism: Blood Culture PCR (07-29-19 @ 13:16)      -  Streptococcus sp. (Not Grp A, B or S pneumoniae): Detec      Method Type: PCR    Culture - Urine (collected 07-27-19 @ 12:13)  Source: .Urine Clean Catch (Midstream)  Final Report (07-28-19 @ 12:52):    No growth    Culture - Sputum (collected 07-27-19 @ 12:05)  Source: .Sputum Sputum  Gram Stain (07-27-19 @ 22:50):    Moderate polymorphonuclear leukocytes per low power field    Few Squamous epithelial cells per low power field    Moderate Yeast like cells per oil power field    Moderate Gram Positive Cocci in Pairs and Chains per oil power field  Final Report (07-29-19 @ 16:56):    Normal Respiratory Jenny present    Culture - Urine (collected 07-25-19 @ 12:30)  Source: .Urine Clean Catch (Midstream)  Final Report (07-26-19 @ 12:45):    No growth    Culture - Blood (collected 07-25-19 @ 11:20)  Source: .Blood Blood-Peripheral  Preliminary Report (07-26-19 @ 16:01):    No growth to date.    Culture - Blood (collected 07-25-19 @ 11:20)  Source: .Blood Blood-Peripheral  Preliminary Report (07-26-19 @ 16:01):    No growth to date.    Culture - Blood (collected 07-16-19 @ 17:46)  Source: .Blood Blood-Peripheral  Final Report (07-22-19 @ 02:00):    No growth at 5 days.    Culture - Blood (collected 07-16-19 @ 17:46)  Source: .Blood Blood-Peripheral  Final Report (07-22-19 @ 02:00):    No growth at 5 days.            CAPILLARY BLOOD GLUCOSE      POCT Blood Glucose.: 137 mg/dL (30 Jul 2019 05:59)      RADIOLOGY  CXR:    Clear no infiltrate      VITALS:  T(C): 37.1 (07-30-19 @ 05:00), Max: 37.3 (07-30-19 @ 01:00)  T(F): 98.7 (07-30-19 @ 05:00), Max: 99.2 (07-30-19 @ 01:00)  HR: 82 (07-30-19 @ 06:00) (63 - 140)  BP: 152/74 (07-30-19 @ 06:00) (90/50 - 244/160)  BP(mean): 92 (07-30-19 @ 06:00) (59 - 173)  ABP: --  ABP(mean): --  RR: 16 (07-30-19 @ 06:00) (14 - 28)  SpO2: 97% (07-30-19 @ 06:00) (92% - 100%)  CVP(mm Hg): --  CVP(cm H2O): --    Ins and Outs     07-29-19 @ 07:01  -  07-30-19 @ 07:00  --------------------------------------------------------  IN: 4071 mL / OUT: 1475 mL / NET: 2596 mL            Device: 840, Mode: PS (Pressure Support)/ Spontaneous, RR (patient): 15, TV (patient): 456, FiO2: 30, PEEP: 5, PS: 5, MAP: 8.1, PIP: 11    I&O's Detail    29 Jul 2019 07:01  -  30 Jul 2019 07:00  --------------------------------------------------------  IN:    dexmedetomidine Infusion: 233.8 mL    lactated ringers.: 1875 mL    lactated ringers.: 1000 mL    pantoprazole Infusion: 120 mL    propofol Infusion: 167.2 mL    Solution: 175 mL    Solution: 200 mL    Solution: 250 mL    Solution: 50 mL  Total IN: 4071 mL    OUT:    Indwelling Catheter - Urethral: 1400 mL    Nasoenteral Tube: 75 mL  Total OUT: 1475 mL    Total NET: 2596 mL Follow-up Critical Care Progress Note  Chief Complaint : Altered mental status    s/p EGD yesterday with bleeding visibile vesicle   s/p clip   no overnihgt bleeding noted    this am placed on cpap, comfortable,   no cp, sob, palp, n/v    Allergies :No Known Allergies      PAST MEDICAL & SURGICAL HISTORY:  Gout  Hypertension  Dementia  H/O inguinal hernia repair      Medications:  MEDICATIONS  (STANDING):  cefTRIAXone   IVPB 1000 milliGRAM(s) IV Intermittent every 24 hours  chlorhexidine 4% Liquid 1 Application(s) Topical <User Schedule>  dexmedetomidine Infusion 0.2 MICROgram(s)/kG/Hr (4.91 mL/Hr) IV Continuous <Continuous>  dextrose 5%. 1000 milliLiter(s) (50 mL/Hr) IV Continuous <Continuous>  dextrose 50% Injectable 12.5 Gram(s) IV Push once  dextrose 50% Injectable 25 Gram(s) IV Push once  dextrose 50% Injectable 25 Gram(s) IV Push once  insulin lispro (HumaLOG) corrective regimen sliding scale   SubCutaneous every 6 hours  lactated ringers. 1000 milliLiter(s) (125 mL/Hr) IV Continuous <Continuous>  pantoprazole Infusion 8 mG/Hr (10 mL/Hr) IV Continuous <Continuous>  propofol Infusion 5 MICROgram(s)/kG/Min (2.946 mL/Hr) IV Continuous <Continuous>    MEDICATIONS  (PRN):  acetaminophen   Tablet .. 650 milliGRAM(s) Oral every 6 hours PRN Mild Pain (1 - 3)  ondansetron Injectable 4 milliGRAM(s) IV Push every 6 hours PRN Nausea and/or Vomiting  sodium chloride 0.9% lock flush 10 milliLiter(s) IV Push every 1 hour PRN Pre/post blood products, medications, blood draw, and to maintain line patency      LABS:                        8.8    9.18  )-----------( 144      ( 30 Jul 2019 04:40 )             27.8     07-30    144  |  108  |  29<H>  ----------------------------<  131<H>  3.4<L>   |  30  |  1.37<H>    Ca    8.1<L>      30 Jul 2019 04:40  Phos  3.5     07-30  Mg     1.8     07-30    TPro  5.6<L>  /  Alb  2.0<L>  /  TBili  0.7  /  DBili  x   /  AST  19  /  ALT  14  /  AlkPhos  48  07-29    H/H Trend  07-30-19 @ 04:40   -  8.8<L> / 27.8<L>  07-29-19 @ 06:35   -  8.9<L> / 27.5<L>  07-28-19 @ 12:30   -  9.1<L> / 28.1<L>  07-28-19 @ 05:00   -  9.4<L> / 28.6<L>  07-27-19 @ 23:00   -  9.4<L> / 28.4<L>  07-27-19 @ 18:25   -  10.4<L> / 31.4<L>    Trend Bun/Cr  07-30-19 @ 04:40  BUN/CR -  29<H> / 1.37<H>  07-29-19 @ 06:35  BUN/CR -  36<H> / 1.52<H>  07-28-19 @ 12:30  BUN/CR -  56<H> / 2.07<H>  07-28-19 @ 05:00  BUN/CR -  64<H> / 2.50<H>  07-27-19 @ 18:25  BUN/CR -  70<H> / 3.27<H>  07-27-19 @ 10:50  BUN/CR -  69<H> / 3.79<H>            PT/INR - ( 29 Jul 2019 06:35 )   PT: 14.7 sec;   INR: 1.30 ratio           CULTURES: (if applicable)    Culture - Blood (collected 07-27-19 @ 12:13)  Source: .Blood Blood  Preliminary Report (07-28-19 @ 20:00):    No growth to date.    Culture - Blood (collected 07-27-19 @ 12:13)  Source: .Blood Blood  Gram Stain (07-28-19 @ 08:04):    Growth in aerobic bottle: Gram Positive Cocci in Pairs and Chains  Final Report (07-29-19 @ 13:16):    Growth in aerobic bottle: Alpha hemolytic strep    (not Strep. pneumoniae or Enterococcus)    Single set isolate, possible contaminant. Contact    Microbiology if susceptibility testing clinically    indicated. Multiple Morphological Strains    "Due to technical problems, Proteus sp. will Not be reported as part of    the BCID panel until further notice"    ***Blood Panel PCR results on this specimen are available    approximately 3 hours after the Gram stain result.***    Gram stain, PCR, and/or culture resultsmay not always    correspond due to difference in methodologies.    ************************************************************    This PCR assay was performed using [employer].    The following targets are tested for: Enterococcus,    vancomycin resistant enterococci, Listeria monocytogenes,    coagulase negative staphylococci, S. aureus,    methicillin resistant S. aureus, Streptococcus agalactiae    (Group B), S. pneumoniae, S. pyogenes (Group A),    Acinetobacter baumannii, Enterobacter cloacae, E. coli,    Klebsiella oxytoca, K. pneumoniae, Proteus sp.,    Serratia marcescens, Haemophilus influenzae,    Neisseria meningitidis, Pseudomonas aeruginosa, Candida    albicans, C. glabrata, C krusei, C parapsilosis,    C. tropicalis and the KPC resistance gene.  Organism: Blood Culture PCR (07-29-19 @ 13:16)  Organism: Blood Culture PCR (07-29-19 @ 13:16)      -  Streptococcus sp. (Not Grp A, B or S pneumoniae): Detec      Method Type: PCR    Culture - Urine (collected 07-27-19 @ 12:13)  Source: .Urine Clean Catch (Midstream)  Final Report (07-28-19 @ 12:52):    No growth    Culture - Sputum (collected 07-27-19 @ 12:05)  Source: .Sputum Sputum  Gram Stain (07-27-19 @ 22:50):    Moderate polymorphonuclear leukocytes per low power field    Few Squamous epithelial cells per low power field    Moderate Yeast like cells per oil power field    Moderate Gram Positive Cocci in Pairs and Chains per oil power field  Final Report (07-29-19 @ 16:56):    Normal Respiratory Jenny present    Culture - Urine (collected 07-25-19 @ 12:30)  Source: .Urine Clean Catch (Midstream)  Final Report (07-26-19 @ 12:45):    No growth    Culture - Blood (collected 07-25-19 @ 11:20)  Source: .Blood Blood-Peripheral  Preliminary Report (07-26-19 @ 16:01):    No growth to date.    Culture - Blood (collected 07-25-19 @ 11:20)  Source: .Blood Blood-Peripheral  Preliminary Report (07-26-19 @ 16:01):    No growth to date.    Culture - Blood (collected 07-16-19 @ 17:46)  Source: .Blood Blood-Peripheral  Final Report (07-22-19 @ 02:00):    No growth at 5 days.    Culture - Blood (collected 07-16-19 @ 17:46)  Source: .Blood Blood-Peripheral  Final Report (07-22-19 @ 02:00):    No growth at 5 days.            CAPILLARY BLOOD GLUCOSE      POCT Blood Glucose.: 137 mg/dL (30 Jul 2019 05:59)      RADIOLOGY  CXR:    Clear no infiltrate      VITALS:  T(C): 37.1 (07-30-19 @ 05:00), Max: 37.3 (07-30-19 @ 01:00)  T(F): 98.7 (07-30-19 @ 05:00), Max: 99.2 (07-30-19 @ 01:00)  HR: 82 (07-30-19 @ 06:00) (63 - 140)  BP: 152/74 (07-30-19 @ 06:00) (90/50 - 244/160)  BP(mean): 92 (07-30-19 @ 06:00) (59 - 173)  ABP: --  ABP(mean): --  RR: 16 (07-30-19 @ 06:00) (14 - 28)  SpO2: 97% (07-30-19 @ 06:00) (92% - 100%)  CVP(mm Hg): --  CVP(cm H2O): --    Ins and Outs     07-29-19 @ 07:01  -  07-30-19 @ 07:00  --------------------------------------------------------  IN: 4071 mL / OUT: 1475 mL / NET: 2596 mL            Device: 840, Mode: PS (Pressure Support)/ Spontaneous, RR (patient): 15, TV (patient): 456, FiO2: 30, PEEP: 5, PS: 5, MAP: 8.1, PIP: 11    I&O's Detail    29 Jul 2019 07:01  -  30 Jul 2019 07:00  --------------------------------------------------------  IN:    dexmedetomidine Infusion: 233.8 mL    lactated ringers.: 1875 mL    lactated ringers.: 1000 mL    pantoprazole Infusion: 120 mL    propofol Infusion: 167.2 mL    Solution: 175 mL    Solution: 200 mL    Solution: 250 mL    Solution: 50 mL  Total IN: 4071 mL    OUT:    Indwelling Catheter - Urethral: 1400 mL    Nasoenteral Tube: 75 mL  Total OUT: 1475 mL    Total NET: 2596 mL

## 2019-07-30 NOTE — PROGRESS NOTE ADULT - PROBLEM SELECTOR PLAN 1
respiratory failure has resolved, successfully extubated   will monitor respiratory status   continue antibiotics  frequent suctioning   aspiration precautions.

## 2019-07-30 NOTE — PROGRESS NOTE ADULT - SUBJECTIVE AND OBJECTIVE BOX
INTERVAL HPI/OVERNIGHT EVENTS:  HPI:  79 y/o male pmh Alzheimer Dementia, gout, HTN who was brought in by wife due to decrease in mobility. Patient seen and examined at bedside. S/P EGD from yesterday. Patient seen and examined at bedside, was extubated this morning. Denies pain at this time.     MEDICATIONS  (STANDING):  cefTRIAXone   IVPB 1000 milliGRAM(s) IV Intermittent every 24 hours  chlorhexidine 4% Liquid 1 Application(s) Topical <User Schedule>  dextrose 5%. 1000 milliLiter(s) (50 mL/Hr) IV Continuous <Continuous>  dextrose 50% Injectable 12.5 Gram(s) IV Push once  dextrose 50% Injectable 25 Gram(s) IV Push once  dextrose 50% Injectable 25 Gram(s) IV Push once  insulin lispro (HumaLOG) corrective regimen sliding scale   SubCutaneous every 6 hours  lactated ringers. 1000 milliLiter(s) (125 mL/Hr) IV Continuous <Continuous>  pantoprazole Infusion 8 mG/Hr (10 mL/Hr) IV Continuous <Continuous>    MEDICATIONS  (PRN):  acetaminophen   Tablet .. 650 milliGRAM(s) Oral every 6 hours PRN Mild Pain (1 - 3)  ondansetron Injectable 4 milliGRAM(s) IV Push every 6 hours PRN Nausea and/or Vomiting  sodium chloride 0.9% lock flush 10 milliLiter(s) IV Push every 1 hour PRN Pre/post blood products, medications, blood draw, and to maintain line patency      Allergies    No Known Allergies    Intolerances        PHYSICAL EXAM:   Vital Signs:  Vital Signs Last 24 Hrs  T(C): 37.1 (30 Jul 2019 07:00), Max: 37.3 (30 Jul 2019 01:00)  T(F): 98.7 (30 Jul 2019 07:00), Max: 99.2 (30 Jul 2019 01:00)  HR: 85 (30 Jul 2019 10:00) (63 - 94)  BP: 96/74 (30 Jul 2019 10:00) (96/74 - 179/85)  BP(mean): 82 (30 Jul 2019 10:00) (72 - 116)  RR: 13 (30 Jul 2019 10:00) (12 - 21)  SpO2: 97% (30 Jul 2019 10:00) (94% - 100%)  Daily     Daily I&O's Summary    29 Jul 2019 07:01  -  30 Jul 2019 07:00  --------------------------------------------------------  IN: 4071 mL / OUT: 1475 mL / NET: 2596 mL      GENERAL:  Appears stated age  HEENT:  NC/AT,  conjunctivae clear and pink  CHEST:  Full & symmetric excursion, no increased effort, breath sounds clear  HEART:  Regular rhythm, S1, S2  ABDOMEN:  Softly distended, active bowel sounds,  EXTEREMITIES:  no edema  SKIN:  No rash, warm/dry  NEURO:  alert      LABS:                        8.8    9.18  )-----------( 144      ( 30 Jul 2019 04:40 )             27.8     07-30    144  |  108  |  29<H>  ----------------------------<  131<H>  3.4<L>   |  30  |  1.37<H>    Ca    8.1<L>      30 Jul 2019 04:40  Phos  3.5     07-30  Mg     1.8     07-30    TPro  5.6<L>  /  Alb  2.0<L>  /  TBili  0.7  /  DBili  x   /  AST  19  /  ALT  14  /  AlkPhos  48  07-29    PT/INR - ( 29 Jul 2019 06:35 )   PT: 14.7 sec;   INR: 1.30 ratio             amylase   lipase  RADIOLOGY & ADDITIONAL TESTS:

## 2019-07-30 NOTE — PROGRESS NOTE ADULT - PROBLEM SELECTOR PROBLEM 3
Aspiration pneumonia
GIB (gastrointestinal bleeding)
Sinus tachycardia
Sinus tachycardia
acuteness of illness

## 2019-07-30 NOTE — PROGRESS NOTE ADULT - ASSESSMENT
78 year old male with sepsis from aspiration pna and acute hypoxic respiratory failure has resolved he remains extubated , Gram positive bacteremia , OLVIN, shock has resolved s/p 3 gastric clips placed for gastric ulscers     Time spent: 30 mins assessing presenting problems of acute illness that poses high probability  end organ damage/dysfunction.  Medical decision making inculding plan of daily care, reviewing data, reviewing radiology, discussing with multidisciplinary team, non inclusive of procedures, discussing goals of care with patient/family

## 2019-07-30 NOTE — CHART NOTE - NSCHARTNOTEFT_GEN_A_CORE
Nutrition Follow Up Note  Hospital Course (Per Electronic Medical Record):   Source: Medical Record [X] Patient [X] Family [X] Nursing Staff [X]     Diet:  clear fluids    Patient extubated this morning, diet advanced to clear fluids , Nursing to conduct a swallow eval at bedside. No nausea/ vomiting noted. Patient noted with gastric ulcer- 3 clips noted.     Current Weight: most recent documented weight (7/28) 208.5/94.6kg,, ? 8lb change since admission.     Pertinent Medications: MEDICATIONS  (STANDING):  cefTRIAXone   IVPB 1000 milliGRAM(s) IV Intermittent every 24 hours  chlorhexidine 4% Liquid 1 Application(s) Topical <User Schedule>  dextrose 5%. 1000 milliLiter(s) (50 mL/Hr) IV Continuous <Continuous>  dextrose 50% Injectable 12.5 Gram(s) IV Push once  dextrose 50% Injectable 25 Gram(s) IV Push once  dextrose 50% Injectable 25 Gram(s) IV Push once  insulin lispro (HumaLOG) corrective regimen sliding scale   SubCutaneous every 6 hours  lactated ringers. 1000 milliLiter(s) (125 mL/Hr) IV Continuous <Continuous>  pantoprazole Infusion 8 mG/Hr (10 mL/Hr) IV Continuous <Continuous>    MEDICATIONS  (PRN):  acetaminophen   Tablet .. 650 milliGRAM(s) Oral every 6 hours PRN Mild Pain (1 - 3)  ondansetron Injectable 4 milliGRAM(s) IV Push every 6 hours PRN Nausea and/or Vomiting  sodium chloride 0.9% lock flush 10 milliLiter(s) IV Push every 1 hour PRN Pre/post blood products, medications, blood draw, and to maintain line patency      Pertinent Labs:  07-30 Na144 mmol/L Glu 131 mg/dL<H> K+ 3.4 mmol/L<L> Cr  1.37 mg/dL<H> BUN 29 mg/dL<H> 07-30 Phos 3.5 mg/dL 07-29 Alb 2.0 g/dL<L> 07-28 [id number] 5.7 %<H>        Skin: Intact    Edema: (7/29) (+2) ankle edema    Last BM: on (7/29)    Estimated Needs:   [X] No Change since Previous Assessment  [X] Recalculated:     Previous Nutrition Diagnosis: inadequate oral nutrition intake    Nutrition Diagnosis is [X] Ongoing         New Nutrition Diagnosis: [X] Not Applicable      Interventions:   1. Recommend advance diet as medically indicated.      Monitoring & Evaluation: will monitor:  [X] Weights   [X] PO Intake   [X] Follow Up (Per Protocol)  [X] Tolerance to Diet Prescription       RD to follow as per Nutrition protocol  Ammy Bateman RDN

## 2019-07-30 NOTE — PROGRESS NOTE ADULT - ASSESSMENT
79 y/o male with multiple medical issues no intubated in CCU after episode of respiratory distress. S/P EGD from yesterday. Gastric ulcer seen with exposed vessel, s/p 3 clips placed. No BRBPR or melena reported. No nausea/vomiting.

## 2019-07-31 LAB
ANION GAP SERPL CALC-SCNC: 11 MMOL/L — SIGNIFICANT CHANGE UP (ref 5–17)
ANION GAP SERPL CALC-SCNC: 18 MMOL/L — HIGH (ref 5–17)
BUN SERPL-MCNC: 17 MG/DL — SIGNIFICANT CHANGE UP (ref 7–23)
BUN SERPL-MCNC: 18 MG/DL — SIGNIFICANT CHANGE UP (ref 7–23)
CALCIUM SERPL-MCNC: 8.8 MG/DL — SIGNIFICANT CHANGE UP (ref 8.4–10.5)
CALCIUM SERPL-MCNC: 8.9 MG/DL — SIGNIFICANT CHANGE UP (ref 8.4–10.5)
CHLORIDE SERPL-SCNC: 100 MMOL/L — SIGNIFICANT CHANGE UP (ref 96–108)
CHLORIDE SERPL-SCNC: 103 MMOL/L — SIGNIFICANT CHANGE UP (ref 96–108)
CO2 SERPL-SCNC: 23 MMOL/L — SIGNIFICANT CHANGE UP (ref 22–31)
CO2 SERPL-SCNC: 29 MMOL/L — SIGNIFICANT CHANGE UP (ref 22–31)
CREAT SERPL-MCNC: 1.06 MG/DL — SIGNIFICANT CHANGE UP (ref 0.5–1.3)
CREAT SERPL-MCNC: 1.13 MG/DL — SIGNIFICANT CHANGE UP (ref 0.5–1.3)
GLUCOSE BLDC GLUCOMTR-MCNC: 107 MG/DL — HIGH (ref 70–99)
GLUCOSE BLDC GLUCOMTR-MCNC: 112 MG/DL — HIGH (ref 70–99)
GLUCOSE BLDC GLUCOMTR-MCNC: 123 MG/DL — HIGH (ref 70–99)
GLUCOSE BLDC GLUCOMTR-MCNC: 141 MG/DL — HIGH (ref 70–99)
GLUCOSE BLDC GLUCOMTR-MCNC: 177 MG/DL — HIGH (ref 70–99)
GLUCOSE SERPL-MCNC: 111 MG/DL — HIGH (ref 70–99)
GLUCOSE SERPL-MCNC: 199 MG/DL — HIGH (ref 70–99)
HCT VFR BLD CALC: 33.9 % — LOW (ref 39–50)
HGB BLD-MCNC: 11.4 G/DL — LOW (ref 13–17)
MAGNESIUM SERPL-MCNC: 1.5 MG/DL — LOW (ref 1.6–2.6)
MAGNESIUM SERPL-MCNC: 2.7 MG/DL — HIGH (ref 1.6–2.6)
MCHC RBC-ENTMCNC: 29.5 PG — SIGNIFICANT CHANGE UP (ref 27–34)
MCHC RBC-ENTMCNC: 33.6 GM/DL — SIGNIFICANT CHANGE UP (ref 32–36)
MCV RBC AUTO: 87.6 FL — SIGNIFICANT CHANGE UP (ref 80–100)
NRBC # BLD: 0 /100 WBCS — SIGNIFICANT CHANGE UP (ref 0–0)
PHOSPHATE SERPL-MCNC: 2.8 MG/DL — SIGNIFICANT CHANGE UP (ref 2.5–4.5)
PLATELET # BLD AUTO: 211 K/UL — SIGNIFICANT CHANGE UP (ref 150–400)
POTASSIUM SERPL-MCNC: 2.8 MMOL/L — CRITICAL LOW (ref 3.5–5.3)
POTASSIUM SERPL-MCNC: 3.7 MMOL/L — SIGNIFICANT CHANGE UP (ref 3.5–5.3)
POTASSIUM SERPL-SCNC: 2.8 MMOL/L — CRITICAL LOW (ref 3.5–5.3)
POTASSIUM SERPL-SCNC: 3.7 MMOL/L — SIGNIFICANT CHANGE UP (ref 3.5–5.3)
RBC # BLD: 3.87 M/UL — LOW (ref 4.2–5.8)
RBC # FLD: 12.8 % — SIGNIFICANT CHANGE UP (ref 10.3–14.5)
SODIUM SERPL-SCNC: 141 MMOL/L — SIGNIFICANT CHANGE UP (ref 135–145)
SODIUM SERPL-SCNC: 143 MMOL/L — SIGNIFICANT CHANGE UP (ref 135–145)
URATE SERPL-MCNC: 7 MG/DL — SIGNIFICANT CHANGE UP (ref 3.4–8.8)
WBC # BLD: 12.28 K/UL — HIGH (ref 3.8–10.5)
WBC # FLD AUTO: 12.28 K/UL — HIGH (ref 3.8–10.5)

## 2019-07-31 PROCEDURE — 73610 X-RAY EXAM OF ANKLE: CPT | Mod: 26,LT

## 2019-07-31 PROCEDURE — 71045 X-RAY EXAM CHEST 1 VIEW: CPT | Mod: 26

## 2019-07-31 PROCEDURE — 99233 SBSQ HOSP IP/OBS HIGH 50: CPT

## 2019-07-31 RX ORDER — LISINOPRIL 2.5 MG/1
40 TABLET ORAL DAILY
Refills: 0 | Status: DISCONTINUED | OUTPATIENT
Start: 2019-07-31 | End: 2019-08-09

## 2019-07-31 RX ORDER — MAGNESIUM SULFATE 500 MG/ML
2 VIAL (ML) INJECTION ONCE
Refills: 0 | Status: DISCONTINUED | OUTPATIENT
Start: 2019-07-31 | End: 2019-07-31

## 2019-07-31 RX ORDER — POTASSIUM CHLORIDE 20 MEQ
10 PACKET (EA) ORAL
Refills: 0 | Status: COMPLETED | OUTPATIENT
Start: 2019-07-31 | End: 2019-07-31

## 2019-07-31 RX ORDER — ALPRAZOLAM 0.25 MG
0.5 TABLET ORAL THREE TIMES A DAY
Refills: 0 | Status: DISCONTINUED | OUTPATIENT
Start: 2019-07-31 | End: 2019-08-04

## 2019-07-31 RX ORDER — POTASSIUM CHLORIDE 20 MEQ
40 PACKET (EA) ORAL ONCE
Refills: 0 | Status: COMPLETED | OUTPATIENT
Start: 2019-07-31 | End: 2019-07-31

## 2019-07-31 RX ORDER — TAMSULOSIN HYDROCHLORIDE 0.4 MG/1
0.4 CAPSULE ORAL AT BEDTIME
Refills: 0 | Status: DISCONTINUED | OUTPATIENT
Start: 2019-07-31 | End: 2019-08-03

## 2019-07-31 RX ORDER — MAGNESIUM SULFATE 500 MG/ML
1 VIAL (ML) INJECTION
Refills: 0 | Status: COMPLETED | OUTPATIENT
Start: 2019-07-31 | End: 2019-07-31

## 2019-07-31 RX ORDER — HYDRALAZINE HCL 50 MG
10 TABLET ORAL EVERY 6 HOURS
Refills: 0 | Status: DISCONTINUED | OUTPATIENT
Start: 2019-07-31 | End: 2019-08-01

## 2019-07-31 RX ORDER — ZOLPIDEM TARTRATE 10 MG/1
5 TABLET ORAL AT BEDTIME
Refills: 0 | Status: DISCONTINUED | OUTPATIENT
Start: 2019-07-31 | End: 2019-08-01

## 2019-07-31 RX ORDER — TRAZODONE HCL 50 MG
100 TABLET ORAL AT BEDTIME
Refills: 0 | Status: DISCONTINUED | OUTPATIENT
Start: 2019-07-31 | End: 2019-08-04

## 2019-07-31 RX ADMIN — Medication 100 MILLIEQUIVALENT(S): at 16:29

## 2019-07-31 RX ADMIN — Medication 100 GRAM(S): at 09:37

## 2019-07-31 RX ADMIN — SODIUM CHLORIDE 75 MILLILITER(S): 9 INJECTION, SOLUTION INTRAVENOUS at 21:50

## 2019-07-31 RX ADMIN — Medication 100 MILLIEQUIVALENT(S): at 13:23

## 2019-07-31 RX ADMIN — SODIUM CHLORIDE 75 MILLILITER(S): 9 INJECTION, SOLUTION INTRAVENOUS at 09:45

## 2019-07-31 RX ADMIN — PANTOPRAZOLE SODIUM 10 MG/HR: 20 TABLET, DELAYED RELEASE ORAL at 21:51

## 2019-07-31 RX ADMIN — Medication 650 MILLIGRAM(S): at 16:31

## 2019-07-31 RX ADMIN — Medication 0.5 MILLIGRAM(S): at 19:59

## 2019-07-31 RX ADMIN — Medication 0.5 MILLIGRAM(S): at 10:22

## 2019-07-31 RX ADMIN — Medication 100 MILLIEQUIVALENT(S): at 14:51

## 2019-07-31 RX ADMIN — Medication 10 MILLIGRAM(S): at 09:37

## 2019-07-31 RX ADMIN — Medication 40 MILLIEQUIVALENT(S): at 09:37

## 2019-07-31 RX ADMIN — Medication 100 MILLIGRAM(S): at 21:49

## 2019-07-31 RX ADMIN — Medication 0.5 MILLIGRAM(S): at 16:31

## 2019-07-31 RX ADMIN — Medication 650 MILLIGRAM(S): at 18:26

## 2019-07-31 RX ADMIN — Medication 100 GRAM(S): at 10:50

## 2019-07-31 RX ADMIN — CEFTRIAXONE 100 MILLIGRAM(S): 500 INJECTION, POWDER, FOR SOLUTION INTRAMUSCULAR; INTRAVENOUS at 16:29

## 2019-07-31 RX ADMIN — TAMSULOSIN HYDROCHLORIDE 0.4 MILLIGRAM(S): 0.4 CAPSULE ORAL at 21:49

## 2019-07-31 RX ADMIN — PANTOPRAZOLE SODIUM 10 MG/HR: 20 TABLET, DELAYED RELEASE ORAL at 14:51

## 2019-07-31 NOTE — PROGRESS NOTE ADULT - ASSESSMENT
Assessment  1. Acute respiratory failure Due to  Aspiration Pneumonitis   2. Severe Sepsis due to Aspiration PNA and Streptococcus bacteremia   3. Upper GIB with Acute blood loss anemia s/p EGD s/p Clip for visible vessel ulcer   4. Acute renal failure due to  Hypotension Resolved with baseline cr 1.2-1.4  5. Underlying Alzheimer's disease       Plan  Abx as per ID  S&S f/u  Check Left ankle xray   GI f/u to advance diet  PPI drip ? transition to PPI BID  restart home BP meds  Hydralazine PRN  start flomax    trend CBC daily      Family Updated: 		family, wife                                 Date:  7/31      To review goc tomorrow

## 2019-07-31 NOTE — SWALLOW BEDSIDE ASSESSMENT ADULT - ORAL PHASE
Delayed oral transit time/Decreased anterior-posterior movement of the bolus Within functional limits Decreased anterior-posterior movement of the bolus/Lingual stasis/Delayed oral transit time

## 2019-07-31 NOTE — PROGRESS NOTE ADULT - SUBJECTIVE AND OBJECTIVE BOX
CC: f/u for pneumonia, resp failure    Patient remains alert, follows commands, denies pain    REVIEW OF SYSTEMS:  All other review of systems negative (Comprehensive ROS), limited    Antimicrobials Day # 5   cefTRIAXone   IVPB 1000 milliGRAM(s) IV Intermittent every 24 hours    Other Medications Reviewed    Vital Signs Last 24 Hrs  T(F): 98.8 (31 Jul 2019 13:00), Max: 101.1 (30 Jul 2019 18:00)  HR: 116 (31 Jul 2019 14:00) (92 - 138)  BP: 148/88 (31 Jul 2019 14:00) (136/75 - 181/96)  BP(mean): 106 (31 Jul 2019 14:00) (90 - 140)  RR: 20 (31 Jul 2019 14:00) (13 - 31)  SpO2: 95% (31 Jul 2019 14:00) (92% - 100%)    PHYSICAL EXAM:  General: alert, no acute distress  Eyes:  anicteric, no conjunctival injection, no discharge  Oropharynx: no lesions or injection 	  Neck: without adenopathy  Lungs: ant rhonchi  Heart: regular rate and rhythm; no murmur, rubs or gallops  Abdomen: soft, nondistended, nontender, without mass or organomegaly  Skin: no lesions  Extremities: mild edema L ankle, no erythema  Neurologic: alert, moves all extremities    LAB RESULTS:   MICROBIOLOGY:  RECENT CULTURES:  07-27 @ 12:13 .Urine Clean Catch (Midstream) Blood Culture PCR    No growth    07-27 @ 12:05 .Sputum Sputum     Normal Respiratory Jenny present    Culture - Blood (07.27.19 @ 12:13)    Specimen Source: .Blood Blood    Culture Results:   No growth to date.    Culture - Blood (07.27.19 @ 12:13)    -  Streptococcus sp. (Not Grp A, B or S pneumoniae): Detec    Gram Stain:   Growth in aerobic bottle: Gram Positive Cocci in Pairs and Chains  Alpha hemolytic strep    RADIOLOGY REVIEWED:  Xray Chest 1 View- PORTABLE-Routine (07.31.19 @ 08:35) >  infiltrate developing in the anterior segment of   the right upper lobe.    Xray Chest 1 View- PORTABLE-Routine (07.30.19 @ 07:07) >  No evidence of acute pulmonary disease.

## 2019-07-31 NOTE — PROGRESS NOTE ADULT - SUBJECTIVE AND OBJECTIVE BOX
Follow-up Critical Care Progress Note  Chief Complaint : Altered mental status      patient extubated yesterday  post extubation noted to cough with clear liquid diet  overnight had episode of urinary retention 1 L S/P [place] placement  no cough today  pt complaining of left ankle pain       Allergies :No Known Allergies      PAST MEDICAL & SURGICAL HISTORY:  Gout  Hypertension  Dementia  H/O inguinal hernia repair      Medications:  MEDICATIONS  (STANDING):  cefTRIAXone   IVPB 1000 milliGRAM(s) IV Intermittent every 24 hours  dextrose 5%. 1000 milliLiter(s) (50 mL/Hr) IV Continuous <Continuous>  dextrose 50% Injectable 12.5 Gram(s) IV Push once  dextrose 50% Injectable 25 Gram(s) IV Push once  dextrose 50% Injectable 25 Gram(s) IV Push once  insulin lispro (HumaLOG) corrective regimen sliding scale   SubCutaneous Before meals and at bedtime  lactated ringers. 1000 milliLiter(s) (125 mL/Hr) IV Continuous <Continuous>  magnesium sulfate  IVPB 1 Gram(s) IV Intermittent every 1 hour  pantoprazole Infusion 8 mG/Hr (10 mL/Hr) IV Continuous <Continuous>  potassium chloride    Tablet ER 40 milliEquivalent(s) Oral once    MEDICATIONS  (PRN):  acetaminophen   Tablet .. 650 milliGRAM(s) Oral every 6 hours PRN Mild Pain (1 - 3)  ondansetron Injectable 4 milliGRAM(s) IV Push every 6 hours PRN Nausea and/or Vomiting  sodium chloride 0.9% lock flush 10 milliLiter(s) IV Push every 1 hour PRN Pre/post blood products, medications, blood draw, and to maintain line patency      LABS:                        11.4   12.28 )-----------( 211      ( 31 Jul 2019 05:00 )             33.9     07-31    143  |  103  |  17  ----------------------------<  111<H>  3.7   |  29  |  1.06    Ca    8.9      31 Jul 2019 05:00  Phos  2.8     07-31  Mg     1.5     07-31    Trend Bun/Cr  07-31-19 @ 05:00  BUN/CR -  17 / 1.06  07-30-19 @ 04:40  BUN/CR -  29<H> / 1.37<H>  07-29-19 @ 06:35  BUN/CR -  36<H> / 1.52<H>  07-28-19 @ 12:30  BUN/CR -  56<H> / 2.07<H>    H/H Trend  07-31-19 @ 05:00   -  11.4<L> / 33.9<L>  07-30-19 @ 04:40   -  8.8<L> / 27.8<L>  07-29-19 @ 06:35   -  8.9<L> / 27.5<L>  07-28-19 @ 12:30   -  9.1<L> / 28.1<L>        CULTURES: (if applicable)    Culture - Blood (collected 07-27-19 @ 12:13)  Source: .Blood Blood  Preliminary Report (07-28-19 @ 20:00):    No growth to date.    Culture - Blood (collected 07-27-19 @ 12:13)  Source: .Blood Blood  Gram Stain (07-28-19 @ 08:04):    Growth in aerobic bottle: Gram Positive Cocci in Pairs and Chains  Final Report (07-29-19 @ 13:16):    Growth in aerobic bottle: Alpha hemolytic strep    (not Strep. pneumoniae or Enterococcus)    Single set isolate, possible contaminant. Contact    Microbiology if susceptibility testing clinically    indicated. Multiple Morphological Strains    "Due to technical problems, Proteus sp. will Not be reported as part of    the BCID panel until further notice"    ***Blood Panel PCR results on this specimen are available    approximately 3 hours after the Gram stain result.***    Gram stain, PCR, and/or culture resultsmay not always    correspond due to difference in methodologies.    ************************************************************    This PCR assay was performed using [employer].    The following targets are tested for: Enterococcus,    vancomycin resistant enterococci, Listeria monocytogenes,    coagulase negative staphylococci, S. aureus,    methicillin resistant S. aureus, Streptococcus agalactiae    (Group B), S. pneumoniae, S. pyogenes (Group A),    Acinetobacter baumannii, Enterobacter cloacae, E. coli,    Klebsiella oxytoca, K. pneumoniae, Proteus sp.,    Serratia marcescens, Haemophilus influenzae,    Neisseria meningitidis, Pseudomonas aeruginosa, Candida    albicans, C. glabrata, C krusei, C parapsilosis,    C. tropicalis and the KPC resistance gene.  Organism: Blood Culture PCR (07-29-19 @ 13:16)  Organism: Blood Culture PCR (07-29-19 @ 13:16)      -  Streptococcus sp. (Not Grp A, B or S pneumoniae): Detec      Method Type: PCR    Culture - Urine (collected 07-27-19 @ 12:13)  Source: .Urine Clean Catch (Midstream)  Final Report (07-28-19 @ 12:52):    No growth    Culture - Sputum (collected 07-27-19 @ 12:05)  Source: .Sputum Sputum  Gram Stain (07-27-19 @ 22:50):    Moderate polymorphonuclear leukocytes per low power field    Few Squamous epithelial cells per low power field    Moderate Yeast like cells per oil power field    Moderate Gram Positive Cocci in Pairs and Chains per oil power field  Final Report (07-29-19 @ 16:56):    Normal Respiratory Jenny present    Culture - Urine (collected 07-25-19 @ 12:30)  Source: .Urine Clean Catch (Midstream)  Final Report (07-26-19 @ 12:45):    No growth    Culture - Blood (collected 07-25-19 @ 11:20)  Source: .Blood Blood-Peripheral  Final Report (07-30-19 @ 16:01):    No growth at 5 days.    Culture - Blood (collected 07-25-19 @ 11:20)  Source: .Blood Blood-Peripheral  Final Report (07-30-19 @ 16:01):    No growth at 5 days.          ABG - ( 30 Jul 2019 10:20 )  pH, Arterial: 7.50  pH, Blood: x     /  pCO2: 32    /  pO2: 75    / HCO3: x     / Base Excess: x     /  SaO2: 95                CAPILLARY BLOOD GLUCOSE      POCT Blood Glucose.: 107 mg/dL (31 Jul 2019 06:12)      RADIOLOGY  CXR:  < from: Xray Chest 1 View- PORTABLE-Routine (07.30.19 @ 07:07) >    Impression:  1. No evidence of acute pulmonary disease.    < end of copied text >  cxr 7/31 poor respiratory effort. increased haziness RUL        VITALS:  T(C): 37.1 (07-31-19 @ 08:00), Max: 38.7 (07-30-19 @ 11:00)  T(F): 98.8 (07-31-19 @ 08:00), Max: 101.6 (07-30-19 @ 11:00)  HR: 106 (07-31-19 @ 08:00) (85 - 123)  BP: 179/95 (07-31-19 @ 08:00) (96/74 - 182/163)  BP(mean): 117 (07-31-19 @ 08:00) (82 - 171)  ABP: --  ABP(mean): --  RR: 22 (07-31-19 @ 08:00) (10 - 22)  SpO2: 93% (07-31-19 @ 08:00) (92% - 100%)  CVP(mm Hg): --  CVP(cm H2O): --    Ins and Outs     07-30-19 @ 07:01  -  07-31-19 @ 07:00  --------------------------------------------------------  IN: 2995 mL / OUT: 5845 mL / NET: -2850 mL                I&O's Detail    30 Jul 2019 07:01  -  31 Jul 2019 07:00  --------------------------------------------------------  IN:    lactated ringers.: 2875 mL    pantoprazole Infusion: 120 mL  Total IN: 2995 mL    OUT:    Indwelling Catheter - Urethral: 5845 mL  Total OUT: 5845 mL    Total NET: -2850 mL

## 2019-07-31 NOTE — SWALLOW BEDSIDE ASSESSMENT ADULT - SWALLOW EVAL: DIAGNOSIS
Pt presents with oral dysphagia characterized by increased oral transit time and reduced mastication, negatively impacted by edentulous state. Pharyngeal stage appears grossly WFL, no overt signs/symptoms of penetration/aspiration were observed.

## 2019-07-31 NOTE — SWALLOW BEDSIDE ASSESSMENT ADULT - SLP PERTINENT HISTORY OF CURRENT PROBLEM
79 yo M with PMHx of Alzheimer Dementia, gout, HTN brought in by wife from home for declining mental status and difficulty ambulating x 1 week. History obtained from pt's wife at bedside. Pt has known dementia but over the past week, his wife has noticed that he had difficulty ambulating with swelling of both feet, decreased PO intake and worsening agitation.Pt was seen by his PCP, Dr. Adame who started treatment for gout and sent Rx for steroids but his wife has not picked up the prescription.  He has been taking Alprazolam for many years, but recently also prescribed Zolpidem at bedtime as needed for increased agitation. His wife also notices decrease in urine output, denies recent illness, falls, LOC, fevers, chills, nausea, vomiting, diarrhea

## 2019-07-31 NOTE — PROGRESS NOTE ADULT - ASSESSMENT
Physical Exam    GENERAL:               Alert,  No acute distress.    HEENT:                    No JVD, dry MM  PULM:                     Bilateral air entry, Clear to auscultation bilaterally, no significant sputum production, No Rales, No Rhonchi, No Wheezing  CVS:                         S1, S2,  No Murmur  ABD:                        Soft, distended, nontender, normoactive bowel sounds,   EXT:                         No edema, nontender, No Cyanosis or Clubbing , left ankle swelling mild with tenderness. trace warmth,  NEURO:                  Alert,  interactive, nonfocal, follows commands  PSYC:                      Calm,       Assessment  1. Acute respiratory failure Due to  Aspiration Pneumonitis   2. Severe Sepsis due to Aspiration PNA and Streptococcus bacteremia   3. Upper GIB with Acute blood loss anemia s/p EGD s/p Clip for visible vessel ulcer   4. Acute renal failure due to  Hypotension Resolved with baseline cr 1.2-1.4  5. Underlying Alzheimer's disease       Plan  Abx as per ID  S&S f/u  Check Left ankle xray   GI f/u to advance diet  PPI drip ? transition to PPI BID  restart home BP meds  Hydralazine PRN  start flomax    trend CBC daily      Family Updated: 		family, wife                                 Date:  7/31      Sedation & Analgesia:	as above   Diet/Nutrition:		Advance as per S&S   GI PPx:			PPI drip    DVT Ppx:		venodynes      Activity:		      Bedrest   Head of Bed:               35-45  Glycemic Control:        n/a    Lines: PIV  EDWARDS: 		        [ x] YES [ ] NO  		                                       DATE INSERTED:	7/30	            REMOVE:  [ ] YES [x ] NO  Urinary retention     Restraints were deemed necessary to prevent removal of life-sustaining devices [ ] YES   [ x   ]  NO    Disposition: Transfer to Medical floor today, after s&s f/u will d/w Hospitalist prior to transfer  Goals of Care: Full code

## 2019-07-31 NOTE — SWALLOW BEDSIDE ASSESSMENT ADULT - COMMENTS
Per MD, pt with GI bleed and subsequent aspiration event.  Pt s/p extubation 30-Jul-2019  WBC: 12.28  Chest xray: Present film shows an infiltrate developing in the anterior segment of the right upper lobe.

## 2019-07-31 NOTE — OCCUPATIONAL THERAPY INITIAL EVALUATION ADULT - MANUAL MUSCLE TESTING RESULTS, REHAB EVAL
grossly assessed due to/Difficulty following directions, unable to comprehend despite wife instructing in native language. Requires verbal, visual and hand over hand cues

## 2019-07-31 NOTE — PROGRESS NOTE ADULT - ASSESSMENT
79 yo M, dementia, here with agitation, functional decline  Distention, coffee ground emesis, s/p EGD- clipped gastric ulcer  Remains extubated, alert, no distress  Afebrile since last evening  Minimal leukocytosis  Sputum normal nghia only  Alpha Strep in single Bld Cx likely a contaminant  CXR now with evolution of R infiltrate, confirming clinical suspicion of aspiration pneumonia    Plan:  Continue Ceftriaxone- hope to limit to another 24 hrs  Follow temps and CBC/diff   D/w ICU team and wife at bedside- GIB, aspiration, reviewed

## 2019-07-31 NOTE — OCCUPATIONAL THERAPY INITIAL EVALUATION ADULT - PERTINENT HX OF CURRENT PROBLEM, REHAB EVAL
Patient admitted after 1 week of decrease ambulation, swelling of bilateral LE's, decrease intake, and worsening agitation

## 2019-07-31 NOTE — PROVIDER CONTACT NOTE (CRITICAL VALUE NOTIFICATION) - TEST AND RESULT REPORTED:
Blood cx collected july 27th 12:13 , showing growth aerobic bottle gram positive cocci in pairs and chains
gregory cheema
Trop 0.058
Lactate: 2.5

## 2019-07-31 NOTE — PROGRESS NOTE ADULT - SUBJECTIVE AND OBJECTIVE BOX
Follow-up Pall Progress Note    Lalo Perry MD  [phone]    No new respiratory events overnight.  Denies SOB/CP. Extubated.  No distress.    Medications:  Vital Signs Last 24 Hrs  T(C): 37.1 (31 Jul 2019 08:00), Max: 38.4 (30 Jul 2019 18:00)  T(F): 98.8 (31 Jul 2019 08:00), Max: 101.1 (30 Jul 2019 18:00)  HR: 137 (31 Jul 2019 10:00) (92 - 138)  BP: 136/75 (31 Jul 2019 10:00) (132/58 - 182/163)  BP(mean): 94 (31 Jul 2019 10:00) (85 - 171)  RR: 21 (31 Jul 2019 10:00) (13 - 24)  SpO2: 98% (31 Jul 2019 10:00) (92% - 100%)    ABG - ( 30 Jul 2019 10:20 )  pH, Arterial: 7.50  pH, Blood: x     /  pCO2: 32    /  pO2: 75    / HCO3: x     / Base Excess: x     /  SaO2: 95                    07-30 @ 07:01  -  07-31 @ 07:00  --------------------------------------------------------  IN: 2995 mL / OUT: 5845 mL / NET: -2850 mL        LABS:                        11.4   12.28 )-----------( 211      ( 31 Jul 2019 05:00 )             33.9     07-31    143  |  103  |  17  ----------------------------<  111<H>  3.7   |  29  |  1.06    Ca    8.9      31 Jul 2019 05:00  Phos  2.8     07-31  Mg     1.5     07-31                CULTURES:  Culture Results:   No growth (07-27 @ 12:13)  Culture Results:   Growth in aerobic bottle: Alpha hemolytic strep  (not Strep. pneumoniae or Enterococcus)  Single set isolate, possible contaminant. Contact  Microbiology if susceptibility testing clinically  indicated. Multiple Morphological Strains  "Due to technical problems, Proteus sp. will Not be reported as part of  the BCID panel until further notice"  ***Blood Panel PCR results on this specimen are available  approximately 3 hours after the Gram stain result.***  Gram stain, PCR, and/or culture resultsmay not always  correspond due to difference in methodologies.  ************************************************************  This PCR assay was performed using [employer].  The following targets are tested for: Enterococcus,  vancomycin resistant enterococci, Listeria monocytogenes,  coagulase negative staphylococci, S. aureus,  methicillin resistant S. aureus, Streptococcus agalactiae  (Group B), S. pneumoniae, S. pyogenes (Group A),  Acinetobacter baumannii, Enterobacter cloacae, E. coli,  Klebsiella oxytoca, K. pneumoniae, Proteus sp.,  Serratia marcescens, Haemophilus influenzae,  Neisseria meningitidis, Pseudomonas aeruginosa, Candida  albicans, C. glabrata, C krusei, C parapsilosis,  C. tropicalis and the KPC resistance gene. (07-27 @ 12:13)  Culture Results:   No growth to date. (07-27 @ 12:13)  Culture Results:   Normal Respiratory Jenny present (07-27 @ 12:05)  Culture Results:   No growth (07-25 @ 12:30)  Culture Results:   No growth at 5 days. (07-25 @ 11:20)  Culture Results:   No growth at 5 days. (07-25 @ 11:20)    Most recent blood culture -- 07-27 @ 12:13   Blood Culture PCR Blood Culture PCR .Urine Clean Catch (Midstream) 07-27 @ 12:13  Most recent blood culture -- 07-27 @ 12:05   -- -- .Sputum Sputum 07-27 @ 12:05      Physical Examination:  PULM: Clear to auscultation bilaterally, no significant sputum production  CVS: Regular rate and rhythm, no murmurs, rubs, or gallops  ABD: Soft, non-tender  EXT:  No clubbing, cyanosis, or edema

## 2019-08-01 LAB
ANION GAP SERPL CALC-SCNC: 9 MMOL/L — SIGNIFICANT CHANGE UP (ref 5–17)
BUN SERPL-MCNC: 16 MG/DL — SIGNIFICANT CHANGE UP (ref 7–23)
CALCIUM SERPL-MCNC: 8.5 MG/DL — SIGNIFICANT CHANGE UP (ref 8.4–10.5)
CHLORIDE SERPL-SCNC: 104 MMOL/L — SIGNIFICANT CHANGE UP (ref 96–108)
CO2 SERPL-SCNC: 27 MMOL/L — SIGNIFICANT CHANGE UP (ref 22–31)
CREAT SERPL-MCNC: 1.18 MG/DL — SIGNIFICANT CHANGE UP (ref 0.5–1.3)
CULTURE RESULTS: SIGNIFICANT CHANGE UP
GLUCOSE BLDC GLUCOMTR-MCNC: 113 MG/DL — HIGH (ref 70–99)
GLUCOSE BLDC GLUCOMTR-MCNC: 122 MG/DL — HIGH (ref 70–99)
GLUCOSE BLDC GLUCOMTR-MCNC: 124 MG/DL — HIGH (ref 70–99)
GLUCOSE BLDC GLUCOMTR-MCNC: 128 MG/DL — HIGH (ref 70–99)
GLUCOSE SERPL-MCNC: 147 MG/DL — HIGH (ref 70–99)
HCT VFR BLD CALC: 31.4 % — LOW (ref 39–50)
HGB BLD-MCNC: 10.5 G/DL — LOW (ref 13–17)
MAGNESIUM SERPL-MCNC: 1.8 MG/DL — SIGNIFICANT CHANGE UP (ref 1.6–2.6)
MCHC RBC-ENTMCNC: 29.4 PG — SIGNIFICANT CHANGE UP (ref 27–34)
MCHC RBC-ENTMCNC: 33.4 GM/DL — SIGNIFICANT CHANGE UP (ref 32–36)
MCV RBC AUTO: 88 FL — SIGNIFICANT CHANGE UP (ref 80–100)
NRBC # BLD: 0 /100 WBCS — SIGNIFICANT CHANGE UP (ref 0–0)
PLATELET # BLD AUTO: 221 K/UL — SIGNIFICANT CHANGE UP (ref 150–400)
POTASSIUM SERPL-MCNC: 3.1 MMOL/L — LOW (ref 3.5–5.3)
POTASSIUM SERPL-SCNC: 3.1 MMOL/L — LOW (ref 3.5–5.3)
RBC # BLD: 3.57 M/UL — LOW (ref 4.2–5.8)
RBC # FLD: 13 % — SIGNIFICANT CHANGE UP (ref 10.3–14.5)
SODIUM SERPL-SCNC: 140 MMOL/L — SIGNIFICANT CHANGE UP (ref 135–145)
SPECIMEN SOURCE: SIGNIFICANT CHANGE UP
WBC # BLD: 11.16 K/UL — HIGH (ref 3.8–10.5)
WBC # FLD AUTO: 11.16 K/UL — HIGH (ref 3.8–10.5)

## 2019-08-01 PROCEDURE — 99497 ADVNCD CARE PLAN 30 MIN: CPT

## 2019-08-01 PROCEDURE — 93010 ELECTROCARDIOGRAM REPORT: CPT

## 2019-08-01 PROCEDURE — 99233 SBSQ HOSP IP/OBS HIGH 50: CPT

## 2019-08-01 RX ORDER — POTASSIUM CHLORIDE 20 MEQ
40 PACKET (EA) ORAL EVERY 4 HOURS
Refills: 0 | Status: COMPLETED | OUTPATIENT
Start: 2019-08-01 | End: 2019-08-01

## 2019-08-01 RX ORDER — PANTOPRAZOLE SODIUM 20 MG/1
40 TABLET, DELAYED RELEASE ORAL
Refills: 0 | Status: DISCONTINUED | OUTPATIENT
Start: 2019-08-01 | End: 2019-08-09

## 2019-08-01 RX ORDER — LATANOPROST 0.05 MG/ML
1 SOLUTION/ DROPS OPHTHALMIC; TOPICAL AT BEDTIME
Refills: 0 | Status: DISCONTINUED | OUTPATIENT
Start: 2019-08-01 | End: 2019-08-09

## 2019-08-01 RX ORDER — PANTOPRAZOLE SODIUM 20 MG/1
40 TABLET, DELAYED RELEASE ORAL
Refills: 0 | Status: DISCONTINUED | OUTPATIENT
Start: 2019-08-01 | End: 2019-08-01

## 2019-08-01 RX ORDER — AMLODIPINE BESYLATE 2.5 MG/1
5 TABLET ORAL DAILY
Refills: 0 | Status: DISCONTINUED | OUTPATIENT
Start: 2019-08-01 | End: 2019-08-08

## 2019-08-01 RX ORDER — POTASSIUM CHLORIDE 20 MEQ
10 PACKET (EA) ORAL
Refills: 0 | Status: COMPLETED | OUTPATIENT
Start: 2019-08-01 | End: 2019-08-01

## 2019-08-01 RX ADMIN — LISINOPRIL 40 MILLIGRAM(S): 2.5 TABLET ORAL at 06:53

## 2019-08-01 RX ADMIN — Medication 40 MILLIEQUIVALENT(S): at 14:19

## 2019-08-01 RX ADMIN — Medication 0.5 MILLIGRAM(S): at 14:19

## 2019-08-01 RX ADMIN — SODIUM CHLORIDE 75 MILLILITER(S): 9 INJECTION, SOLUTION INTRAVENOUS at 06:53

## 2019-08-01 RX ADMIN — Medication 40 MILLIEQUIVALENT(S): at 17:18

## 2019-08-01 RX ADMIN — Medication 100 MILLIEQUIVALENT(S): at 09:43

## 2019-08-01 RX ADMIN — SODIUM CHLORIDE 75 MILLILITER(S): 9 INJECTION, SOLUTION INTRAVENOUS at 12:03

## 2019-08-01 RX ADMIN — LATANOPROST 1 DROP(S): 0.05 SOLUTION/ DROPS OPHTHALMIC; TOPICAL at 22:30

## 2019-08-01 RX ADMIN — TAMSULOSIN HYDROCHLORIDE 0.4 MILLIGRAM(S): 0.4 CAPSULE ORAL at 22:29

## 2019-08-01 RX ADMIN — Medication 100 MILLIEQUIVALENT(S): at 12:02

## 2019-08-01 RX ADMIN — Medication 100 MILLIEQUIVALENT(S): at 11:10

## 2019-08-01 RX ADMIN — PANTOPRAZOLE SODIUM 10 MG/HR: 20 TABLET, DELAYED RELEASE ORAL at 06:56

## 2019-08-01 RX ADMIN — AMLODIPINE BESYLATE 5 MILLIGRAM(S): 2.5 TABLET ORAL at 10:22

## 2019-08-01 RX ADMIN — CEFTRIAXONE 100 MILLIGRAM(S): 500 INJECTION, POWDER, FOR SOLUTION INTRAMUSCULAR; INTRAVENOUS at 16:00

## 2019-08-01 NOTE — PROGRESS NOTE ADULT - SUBJECTIVE AND OBJECTIVE BOX
Patient is a 78y old  Male who presents with a chief complaint of Worsening agitation and difficulty ambulating (31 Jul 2019 16:17). Afebrile overnight.        Patient seen and examined at bedside.    ALLERGIES:  No Known Allergies    MEDICATIONS  (STANDING):  amLODIPine   Tablet 5 milliGRAM(s) Oral daily  cefTRIAXone   IVPB 1000 milliGRAM(s) IV Intermittent every 24 hours  dextrose 5%. 1000 milliLiter(s) (50 mL/Hr) IV Continuous <Continuous>  dextrose 50% Injectable 12.5 Gram(s) IV Push once  dextrose 50% Injectable 25 Gram(s) IV Push once  dextrose 50% Injectable 25 Gram(s) IV Push once  insulin lispro (HumaLOG) corrective regimen sliding scale   SubCutaneous Before meals and at bedtime  lactated ringers. 1000 milliLiter(s) (75 mL/Hr) IV Continuous <Continuous>  lisinopril 40 milliGRAM(s) Oral daily  pantoprazole  Injectable 40 milliGRAM(s) IV Push two times a day  potassium chloride  10 mEq/100 mL IVPB 10 milliEquivalent(s) IV Intermittent every 1 hour  tamsulosin 0.4 milliGRAM(s) Oral at bedtime    MEDICATIONS  (PRN):  acetaminophen   Tablet .. 650 milliGRAM(s) Oral every 6 hours PRN Mild Pain (1 - 3)  ALPRAZolam 0.5 milliGRAM(s) Oral three times a day PRN agitation/anxiety  ondansetron Injectable 4 milliGRAM(s) IV Push every 6 hours PRN Nausea and/or Vomiting  sodium chloride 0.9% lock flush 10 milliLiter(s) IV Push every 1 hour PRN Pre/post blood products, medications, blood draw, and to maintain line patency  traZODone 100 milliGRAM(s) Oral at bedtime PRN sleep/agitation  zolpidem 5 milliGRAM(s) Oral at bedtime PRN Insomnia    Vital Signs Last 24 Hrs  T(F): 98.7 (01 Aug 2019 07:00), Max: 99.4 (31 Jul 2019 17:51)  HR: 99 (01 Aug 2019 07:00) (99 - 118)  BP: 149/74 (01 Aug 2019 07:00) (140/76 - 157/85)  RR: 15 (01 Aug 2019 07:00) (15 - 94)  SpO2: 94% (31 Jul 2019 22:00) (94% - 97%)  I&O's Summary    31 Jul 2019 07:01  -  01 Aug 2019 07:00  --------------------------------------------------------  IN: 2305 mL / OUT: 2300 mL / NET: 5 mL    01 Aug 2019 07:01  -  01 Aug 2019 10:51  --------------------------------------------------------  IN: 460 mL / OUT: 0 mL / NET: 460 mL      BMI (kg/m2): 37.8 (07-31-19 @ 17:51)  PHYSICAL EXAM:  General: NAD, awake , alert  ENT: MMM  Neck: Supple, No JVD  Lungs: Clear to auscultation bilaterally  Cardio: RRR, S1/S2,   Abdomen: Soft, Nontender, Nondistended; Bowel sounds present  : +redmond  Extremities: No calf tenderness, No pitting edema    LABS:                        11.4   12.28 )-----------( 211      ( 31 Jul 2019 05:00 )             33.9       07-31    141  |  100  |  18  ----------------------------<  199  2.8   |  23  |  1.13    Ca    8.8      31 Jul 2019 11:55  Phos  2.8     07-31  Mg     2.7     07-31       eGFR if Non African American: 63 mL/min/1.73M2 (07-31-19 @ 11:55)  eGFR if African American: 73 mL/min/1.73M2 (07-31-19 @ 11:55)                   ABG - ( 30 Jul 2019 10:20 )  pH, Arterial: 7.50  pH, Blood: x     /  pCO2: 32    /  pO2: 75    / HCO3: x     / Base Excess: x     /  SaO2: 95                      POCT Blood Glucose.: 128 mg/dL (01 Aug 2019 08:20)  POCT Blood Glucose.: 141 mg/dL (31 Jul 2019 21:47)  POCT Blood Glucose.: 123 mg/dL (31 Jul 2019 16:38)  POCT Blood Glucose.: 177 mg/dL (31 Jul 2019 11:44)    07-28 TrsdsoeaiaR2E 5.7        Culture - Blood (collected 27 Jul 2019 12:13)  Source: .Blood Blood  Preliminary Report (28 Jul 2019 20:00):    No growth to date.    Culture - Blood (collected 27 Jul 2019 12:13)  Source: .Blood Blood  Gram Stain (28 Jul 2019 08:04):    Growth in aerobic bottle: Gram Positive Cocci in Pairs and Chains  Final Report (29 Jul 2019 13:16):    Growth in aerobic bottle: Alpha hemolytic strep    (not Strep. pneumoniae or Enterococcus)    Single set isolate, possible contaminant. Contact    Microbiology if susceptibility testing clinically    indicated. Multiple Morphological Strains    "Due to technical problems, Proteus sp. will Not be reported as part of    the BCID panel until further notice"    ***Blood Panel PCR results on this specimen are available    approximately 3 hours after the Gram stain result.***    Gram stain, PCR, and/or culture resultsmay not always    correspond due to difference in methodologies.    ************************************************************    This PCR assay was performed using [employer].    The following targets are tested for: Enterococcus,    vancomycin resistant enterococci, Listeria monocytogenes,    coagulase negative staphylococci, S. aureus,    methicillin resistant S. aureus, Streptococcus agalactiae    (Group B), S. pneumoniae, S. pyogenes (Group A),    Acinetobacter baumannii, Enterobacter cloacae, E. coli,    Klebsiella oxytoca, K. pneumoniae, Proteus sp.,    Serratia marcescens, Haemophilus influenzae,    Neisseria meningitidis, Pseudomonas aeruginosa, Candida    albicans, C. glabrata, C krusei, C parapsilosis,    C. tropicalis and the KPC resistance gene.  Organism: Blood Culture PCR (29 Jul 2019 13:16)  Organism: Blood Culture PCR (29 Jul 2019 13:16)      -  Streptococcus sp. (Not Grp A, B or S pneumoniae): Detec      Method Type: PCR    Culture - Urine (collected 27 Jul 2019 12:13)  Source: .Urine Clean Catch (Midstream)  Final Report (28 Jul 2019 12:52):    No growth    Culture - Sputum (collected 27 Jul 2019 12:05)  Source: .Sputum Sputum  Gram Stain (27 Jul 2019 22:50):    Moderate polymorphonuclear leukocytes per low power field    Few Squamous epithelial cells per low power field    Moderate Yeast like cells per oil power field    Moderate Gram Positive Cocci in Pairs and Chains per oil power field  Final Report (29 Jul 2019 16:56):    Normal Respiratory Jenny present        RADIOLOGY & ADDITIONAL TESTS:  < from: Xray Ankle Complete 3 Views, Left (07.31.19 @ 10:02) >  IMPRESSION: Soft tissue swelling.    < end of copied text >    Care Discussed with Consultants/Other Providers:

## 2019-08-01 NOTE — PROGRESS NOTE ADULT - SUBJECTIVE AND OBJECTIVE BOX
f/u Palliative: pt in bed, alert, confused, mildly restless, wife at bedside.    Present Symptoms:   Dyspnea: mild  Nausea/Vomiting: no  Anxiety:  no  Depressed Mood:   Fatigue: mild  Loss of appetite: no  Pain:   no                 location:   Review of Systems:  Unable to obtain due to poor mentation]    MEDICATIONS  (STANDING):  amLODIPine   Tablet 5 milliGRAM(s) Oral daily  cefTRIAXone   IVPB 1000 milliGRAM(s) IV Intermittent every 24 hours  dextrose 5%. 1000 milliLiter(s) (50 mL/Hr) IV Continuous <Continuous>  dextrose 50% Injectable 12.5 Gram(s) IV Push once  dextrose 50% Injectable 25 Gram(s) IV Push once  dextrose 50% Injectable 25 Gram(s) IV Push once  insulin lispro (HumaLOG) corrective regimen sliding scale   SubCutaneous Before meals and at bedtime  lactated ringers. 1000 milliLiter(s) (75 mL/Hr) IV Continuous <Continuous>  lisinopril 40 milliGRAM(s) Oral daily  pantoprazole  Injectable 40 milliGRAM(s) IV Push two times a day  potassium chloride   Solution 40 milliEquivalent(s) Oral every 4 hours  potassium chloride  10 mEq/100 mL IVPB 10 milliEquivalent(s) IV Intermittent every 1 hour  tamsulosin 0.4 milliGRAM(s) Oral at bedtime    MEDICATIONS  (PRN):  acetaminophen   Tablet .. 650 milliGRAM(s) Oral every 6 hours PRN Mild Pain (1 - 3)  ALPRAZolam 0.5 milliGRAM(s) Oral three times a day PRN agitation/anxiety  ondansetron Injectable 4 milliGRAM(s) IV Push every 6 hours PRN Nausea and/or Vomiting  sodium chloride 0.9% lock flush 10 milliLiter(s) IV Push every 1 hour PRN Pre/post blood products, medications, blood draw, and to maintain line patency  traZODone 100 milliGRAM(s) Oral at bedtime PRN sleep/agitation  zolpidem 5 milliGRAM(s) Oral at bedtime PRN Insomnia      PHYSICAL EXAM:  Vital Signs Last 24 Hrs  T(C): 37.1 (01 Aug 2019 07:00), Max: 37.4 (31 Jul 2019 17:51)  T(F): 98.7 (01 Aug 2019 07:00), Max: 99.4 (31 Jul 2019 17:51)  HR: 99 (01 Aug 2019 07:00) (99 - 118)  BP: 149/74 (01 Aug 2019 07:00) (140/76 - 157/85)  BP(mean): 106 (31 Jul 2019 14:00) (94 - 106)  RR: 15 (01 Aug 2019 07:00) (15 - 94)  SpO2: 94% (31 Jul 2019 22:00) (94% - 97%)  General: alert,         HEENT: normal    Lungs: comfortable   CV: normal    GI: normal    : normal    Musculoskeletal: normal   Skin: normal    Neuro: no deficits   Oral intake ability:  oral feeding  Diet: NPO,     LABS:                          10.5   11.16 )-----------( 221      ( 01 Aug 2019 10:47 )             31.4     08-01    140  |  104  |  16  ----------------------------<  147<H>  3.1<L>   |  27  |  1.18    Ca    8.5      01 Aug 2019 10:47  Phos  2.8     07-31  Mg     1.8     08-01          RADIOLOGY & ADDITIONAL STUDIES:    ADVANCE DIRECTIVES:  Advanced Care Planning discussion total time spent: f/u Palliative: pt in bed, alert, confused, mildly restless, wife at bedside.    Present Symptoms:   Dyspnea: mild  Nausea/Vomiting: no  Anxiety:  no  Depressed Mood:   Fatigue: mild  Loss of appetite: no  Pain:   no                 location:   Review of Systems:  Unable to obtain due to poor mentation]    MEDICATIONS  (STANDING):  amLODIPine   Tablet 5 milliGRAM(s) Oral daily  cefTRIAXone   IVPB 1000 milliGRAM(s) IV Intermittent every 24 hours  dextrose 5%. 1000 milliLiter(s) (50 mL/Hr) IV Continuous <Continuous>  dextrose 50% Injectable 12.5 Gram(s) IV Push once  dextrose 50% Injectable 25 Gram(s) IV Push once  dextrose 50% Injectable 25 Gram(s) IV Push once  insulin lispro (HumaLOG) corrective regimen sliding scale   SubCutaneous Before meals and at bedtime  lactated ringers. 1000 milliLiter(s) (75 mL/Hr) IV Continuous <Continuous>  lisinopril 40 milliGRAM(s) Oral daily  pantoprazole  Injectable 40 milliGRAM(s) IV Push two times a day  potassium chloride   Solution 40 milliEquivalent(s) Oral every 4 hours  potassium chloride  10 mEq/100 mL IVPB 10 milliEquivalent(s) IV Intermittent every 1 hour  tamsulosin 0.4 milliGRAM(s) Oral at bedtime    MEDICATIONS  (PRN):  acetaminophen   Tablet .. 650 milliGRAM(s) Oral every 6 hours PRN Mild Pain (1 - 3)  ALPRAZolam 0.5 milliGRAM(s) Oral three times a day PRN agitation/anxiety  ondansetron Injectable 4 milliGRAM(s) IV Push every 6 hours PRN Nausea and/or Vomiting  sodium chloride 0.9% lock flush 10 milliLiter(s) IV Push every 1 hour PRN Pre/post blood products, medications, blood draw, and to maintain line patency  traZODone 100 milliGRAM(s) Oral at bedtime PRN sleep/agitation  zolpidem 5 milliGRAM(s) Oral at bedtime PRN Insomnia      PHYSICAL EXAM:  Vital Signs Last 24 Hrs  T(C): 37.1 (01 Aug 2019 07:00), Max: 37.4 (31 Jul 2019 17:51)  T(F): 98.7 (01 Aug 2019 07:00), Max: 99.4 (31 Jul 2019 17:51)  HR: 99 (01 Aug 2019 07:00) (99 - 118)  BP: 149/74 (01 Aug 2019 07:00) (140/76 - 157/85)  BP(mean): 106 (31 Jul 2019 14:00) (94 - 106)  RR: 15 (01 Aug 2019 07:00) (15 - 94)  SpO2: 94% (31 Jul 2019 22:00) (94% - 97%)  General: alert,  confused       HEENT: n/c, a/t     Lungs: few coarse bs, dim to bases    CV: s1s2    GI: abd soft, n/t     : normal, w/ redmond    Musculoskeletal: ramirez's, L ankle swelling, no redness, no warmth , mildly tender    Skin: normal  , warm dry   Neuro: confused, non focal    Oral intake ability:  oral feeding w/ assist   Diet:  puree w/ thins      LABS:                          10.5   11.16 )-----------( 221      ( 01 Aug 2019 10:47 )             31.4     08-01    140  |  104  |  16  ----------------------------<  147<H>  3.1<L>   |  27  |  1.18    Ca    8.5      01 Aug 2019 10:47  Phos  2.8     07-31  Mg     1.8     08-01          RADIOLOGY & ADDITIONAL STUDIES:< from: Xray Chest 1 View- PORTABLE-Routine (07.31.19 @ 08:35) >  EXAM:  XR CHEST PORTABLE ROUTINE 1V      PROCEDURE DATE:  07/31/2019        INTERPRETATION:  AP semierect chest on July 31, 2019 at 8:10 AM. Patient   has abnormal chest sounds.    Poor inspiration crowds the chest. The heart is likely enlarged.    Endotracheal tube, nasogastric tube, and right jugular line seen on July 30 have been removed.    Present film shows an infiltrate developing in the anterior segment of   the right upper lobe.    Heart obscures the left base.    IMPRESSION: Tubes removed. Developing lung findings.    < from: Xray Ankle Complete 3 Views, Left (07.31.19 @ 10:02) >  EXAM:  ANKLE LEFT (MINIMUM 3 VIEWS)      PROCEDURE DATE:  07/31/2019        INTERPRETATION:  Left ankle. Patient has local pain. 3 views obtained.    There is a small inferior calcaneal spur.    There is edema over the lateral malleolus and also anterior to the ankle   joint.    No bone destruction or fracture is evident.    IMPRESSION: Soft tissue swelling.          ADVANCE DIRECTIVES: full code   Advanced Care Planning discussion total time spent:

## 2019-08-01 NOTE — PROGRESS NOTE ADULT - PROBLEM SELECTOR PLAN 1
discontinue protonix drip  Start protonix BID  Diet as tolerated  Patient will follow-up in office 2 weeks after discharge

## 2019-08-01 NOTE — PROGRESS NOTE ADULT - SUBJECTIVE AND OBJECTIVE BOX
Patient seen earlier today.  No acute complaints.  He denies abdominal pain, nausea or vomiting.  No melena or bright red blood per rectum reported.  Wife at bedside.    MEDICATIONS  (STANDING):  amLODIPine   Tablet 5 milliGRAM(s) Oral daily  dextrose 5%. 1000 milliLiter(s) (50 mL/Hr) IV Continuous <Continuous>  dextrose 50% Injectable 12.5 Gram(s) IV Push once  dextrose 50% Injectable 25 Gram(s) IV Push once  dextrose 50% Injectable 25 Gram(s) IV Push once  insulin lispro (HumaLOG) corrective regimen sliding scale   SubCutaneous Before meals and at bedtime  latanoprost 0.005% Ophthalmic Solution 1 Drop(s) Both EYES at bedtime  lisinopril 40 milliGRAM(s) Oral daily  tamsulosin 0.4 milliGRAM(s) Oral at bedtime    MEDICATIONS  (PRN):  acetaminophen   Tablet .. 650 milliGRAM(s) Oral every 6 hours PRN Mild Pain (1 - 3)  ALPRAZolam 0.5 milliGRAM(s) Oral three times a day PRN agitation/anxiety  ondansetron Injectable 4 milliGRAM(s) IV Push every 6 hours PRN Nausea and/or Vomiting  sodium chloride 0.9% lock flush 10 milliLiter(s) IV Push every 1 hour PRN Pre/post blood products, medications, blood draw, and to maintain line patency  traZODone 100 milliGRAM(s) Oral at bedtime PRN sleep/agitation    No Known Allergies    Intolerances      Vital Signs Last 24 Hrs  T(C): 36.7 (01 Aug 2019 15:31), Max: 37.1 (01 Aug 2019 07:00)  T(F): 98.1 (01 Aug 2019 15:31), Max: 98.7 (01 Aug 2019 07:00)  HR: 104 (01 Aug 2019 16:00) (99 - 104)  BP: 149/77 (01 Aug 2019 16:00) (149/74 - 149/77)  BP(mean): --  RR: 15 (01 Aug 2019 16:00) (15 - 15)  SpO2: 95% (01 Aug 2019 16:00) (95% - 95%)    PHYSICAL EXAM:    Constitutional: NAD, well-developed  Neck: No LAD, supple  Respiratory: clear to auscultation b/l no rales, rhonchi, wheezing  Cardiovascular: S1 and S2, RRR, no murmur  Gastrointestinal: +BS x4, soft, NT/ND, neg HSM,  Extremities: No peripheral edema, neg clubbing, cyanosis  Vascular: 2+ peripheral pulses  Neurological: A/O x 3, no focal deficits  Psychiatric: Normal mood, normal affect  Skin: No rashes      LABS:                        10.5   11.16 )-----------( 221      ( 01 Aug 2019 10:47 )             31.4     08-01    140  |  104  |  16  ----------------------------<  147<H>  3.1<L>   |  27  |  1.18    Ca    8.5      01 Aug 2019 10:47  Phos  2.8     07-31  Mg     1.8     08-01      LIVER FUNCTIONS - ( 29 Jul 2019 06:35 )  Alb: 2.0 g/dL / Pro: 5.6 g/dL / ALK PHOS: 48 U/L / ALT: 14 U/L DA / AST: 19 U/L / GGT: x

## 2019-08-01 NOTE — PROGRESS NOTE ADULT - ASSESSMENT
Eldeddly male admitted with gastrointestinal bleeding, s/p EGD and found to have gastric ulcer that was treated.  He is no longer having gastrointestinal bleeding.

## 2019-08-01 NOTE — PROGRESS NOTE ADULT - ASSESSMENT
Physical Exam    GENERAL:               Alert,  No acute distress.    HEENT:                    No JVD, dry MM  PULM:                     Bilateral air entry, Clear to auscultation bilaterally, no significant sputum production, No Rales, No Rhonchi, No Wheezing  CVS:                         S1, S2,  No Murmur  ABD:                        Soft, distended, nontender, normoactive bowel sounds,   EXT:                         No edema, nontender, No Cyanosis or Clubbing , left ankle swelling mild with tenderness. trace warmth,  NEURO:                  Alert,  interactive, nonfocal, follows commands  PSYC:                      Calm,       Assessment  1. Acute respiratory failure Due to  Aspiration Pneumonitis   2. Severe Sepsis due to Aspiration PNA and Streptococcus bacteremia   3. Upper GIB with Acute blood loss anemia s/p EGD s/p Clip for visible vessel ulcer   4. Acute renal failure due to  Hypotension Resolved with baseline cr 1.2-1.4  5. Underlying Alzheimer's disease       Plan  Abx as per ID - ? today last day  d/c IVF  Appreciate S&S f/u - PUREE with THIN liquids as tolerated  Left ankle xray soft tissue swelling with normal uric acid suspect trauma   PPI BIDrestart home BP meds  Hydralazine PRN  continue flomax voiding trial    trend CBC daily      Family Updated: 		family, wife                                 Date:  8/1      Sedation & Analgesia:	as above   Diet/Nutrition:		oral diet  GI PPx:			PPI BID    DVT Ppx:		venodynes      Activity:		      Bedrest   Head of Bed:               35-45  Glycemic Control:        n/a    Lines: PIV  EDWARDS: 		        [ x] YES [ ] NO  		                                       DATE INSERTED:	7/30	            REMOVE:  [x ] YES [ ] NO  can consider voiding trial    Disposition: continue care on medical floor d/w Dr. Arevalo

## 2019-08-01 NOTE — PROGRESS NOTE ADULT - SUBJECTIVE AND OBJECTIVE BOX
CC: f/u for pneumonia    Patient reports: no specific complaints except still with dry cough and he is not ambulatory.His wife is concerned that he is not ambulatory, desires rehab placement.    REVIEW OF SYSTEMS:  All other review of systems negative (Comprehensive ROS)    Antimicrobials Day #  :day 6  cefTRIAXone   IVPB 1000 milliGRAM(s) IV Intermittent every 24 hours    Other Medications Reviewed    T(F): 98.1 (08-01-19 @ 15:31), Max: 98.7 (08-01-19 @ 07:00)  HR: 104 (08-01-19 @ 16:00)  BP: 149/77 (08-01-19 @ 16:00)  RR: 15 (08-01-19 @ 16:00)  SpO2: 95% (08-01-19 @ 16:00)  Wt(kg): --    PHYSICAL EXAM:  General: alert, no acute distress  Eyes:  anicteric, no conjunctival injection, no discharge  Oropharynx: no lesions or injection 	  Neck: supple, without adenopathy  Lungs: clear to auscultation, diminished BS at bases  Heart: regular rate and rhythm; no murmur, rubs or gallops  Abdomen: soft, nondistended, nontender, without mass or organomegaly  Skin: no lesions  Extremities: no clubbing, cyanosis, or edema  Neurologic: alert, oriented, moves all extremities  Trace edema left ankle, not erythematous and bends ankle okay  LAB RESULTS:                        10.5   11.16 )-----------( 221      ( 01 Aug 2019 10:47 )             31.4     08-01    140  |  104  |  16  ----------------------------<  147<H>  3.1<L>   |  27  |  1.18    Ca    8.5      01 Aug 2019 10:47  Phos  2.8     07-31  Mg     1.8     08-01          MICROBIOLOGY:  RECENT CULTURES:      RADIOLOGY REVIEWED:  < from: Xray Ankle Complete 3 Views, Left (07.31.19 @ 10:02) >  EXAM:  ANKLE LEFT (MINIMUM 3 VIEWS)      PROCEDURE DATE:  07/31/2019        INTERPRETATION:  Left ankle. Patient has local pain. 3 views obtained.    There is a small inferior calcaneal spur.    There is edema over the lateral malleolus and also anterior to the ankle   joint.    No bone destruction or fracture is evident.    IMPRESSION: Soft tissue swelling.    < end of copied text >  < from: Xray Chest 1 View- PORTABLE-Routine (07.31.19 @ 08:35) >  EXAM:  XR CHEST PORTABLE ROUTINE 1V      PROCEDURE DATE:  07/31/2019        INTERPRETATION:  AP semierect chest on July 31, 2019 at 8:10 AM. Patient   has abnormal chest sounds.    Poor inspiration crowds the chest. The heart is likely enlarged.    Endotracheal tube, nasogastric tube, and right jugular line seen on July 30 have been removed.    Present film shows an infiltrate developing in the anterior segment of   the right upper lobe.    Heart obscures the left base.    IMPRESSION: Tubes removed. Developing lung findings.    < end of copied text >

## 2019-08-01 NOTE — PROGRESS NOTE ADULT - ASSESSMENT
79 yo M with h/o dementia, here with agitation, functional decline. Recently extubated after upperGIB-s/p EGD with gastric clipping. Course c/b aspiration PNA. ID, GI and palliative following    #Aspiration PNA:  CXR with evolution R infiltrate  ID following  Afebrile x 48 hrs  Leukocytosis improving  Sputum normal nghia only  Alpha Strep in single Bld Cx likely a contaminant  Appreciate S+S eval    #Leukocytosis:  improving  see above    #Upper GI Bleed:  sp EGD with clipped ulcer  Hgb stable  Stop protonix gtt and start IV protonix BID  GI following    #Hypokalemia:  K 2.8 yesterday--repleted  Awaiting repeat BMP today    #Urinary Retention:  1L urinary retention  Cont redmond  Will TOV  Started flomax    #OLVIN:  improved  avoid nephrotoxic meds  Monitor UOP    #HTN:  Cont norvasc and lisinopril    #L ankle pain:  X ray neg for fx  Obtain uric acid    DVT ppx: Venodynes (holding SQH in setting of GIB)

## 2019-08-01 NOTE — PROGRESS NOTE ADULT - ASSESSMENT
77 yo M, dementia, here with agitation, functional decline  Distention, coffee ground emesis, s/p EGD- clipped gastric ulcer  Remains extubated, alert, no distress  Afebrile for a few days  Minimal leukocytosis  Sputum normal nghia only  Alpha Strep in single Bld Cx likely a contaminant  CXR now with evolution of R infiltrate, confirming clinical suspicion of aspiration pneumonia  He is comfortable on RA  Plan:  day 6 CTX, it is a reasonable juncture to stop  Follow temps and CBC/diff   ID issues reviewed with wife

## 2019-08-01 NOTE — PROGRESS NOTE ADULT - SUBJECTIVE AND OBJECTIVE BOX
Follow-up Critical Care Progress Note  Chief Complaint : Altered mental status      pt tolerated diet, no cp, sob, pallp,n/v  left LE discomfort noted again       Allergies :No Known Allergies      PAST MEDICAL & SURGICAL HISTORY:  Gout  Hypertension  Dementia  H/O inguinal hernia repair      Medications:  MEDICATIONS  (STANDING):  amLODIPine   Tablet 5 milliGRAM(s) Oral daily  cefTRIAXone   IVPB 1000 milliGRAM(s) IV Intermittent every 24 hours  dextrose 5%. 1000 milliLiter(s) (50 mL/Hr) IV Continuous <Continuous>  dextrose 50% Injectable 12.5 Gram(s) IV Push once  dextrose 50% Injectable 25 Gram(s) IV Push once  dextrose 50% Injectable 25 Gram(s) IV Push once  insulin lispro (HumaLOG) corrective regimen sliding scale   SubCutaneous Before meals and at bedtime  lactated ringers. 1000 milliLiter(s) (75 mL/Hr) IV Continuous <Continuous>  lisinopril 40 milliGRAM(s) Oral daily  pantoprazole  Injectable 40 milliGRAM(s) IV Push two times a day  potassium chloride   Solution 40 milliEquivalent(s) Oral every 4 hours  potassium chloride  10 mEq/100 mL IVPB 10 milliEquivalent(s) IV Intermittent every 1 hour  tamsulosin 0.4 milliGRAM(s) Oral at bedtime    MEDICATIONS  (PRN):  acetaminophen   Tablet .. 650 milliGRAM(s) Oral every 6 hours PRN Mild Pain (1 - 3)  ALPRAZolam 0.5 milliGRAM(s) Oral three times a day PRN agitation/anxiety  ondansetron Injectable 4 milliGRAM(s) IV Push every 6 hours PRN Nausea and/or Vomiting  sodium chloride 0.9% lock flush 10 milliLiter(s) IV Push every 1 hour PRN Pre/post blood products, medications, blood draw, and to maintain line patency  traZODone 100 milliGRAM(s) Oral at bedtime PRN sleep/agitation  zolpidem 5 milliGRAM(s) Oral at bedtime PRN Insomnia      LABS:                        10.5   11.16 )-----------( 221      ( 01 Aug 2019 10:47 )             31.4     08-01    140  |  104  |  16  ----------------------------<  147<H>  3.1<L>   |  27  |  1.18    Ca    8.5      01 Aug 2019 10:47  Phos  2.8     07-31  Mg     1.8     08-01          Uric Acid, Serum (07.31.19 @ 11:55)    Uric Acid, Serum: 7.0 mg/dL      CULTURES: (if applicable)    Culture - Blood (collected 07-27-19 @ 12:13)  Source: .Blood Blood  Preliminary Report (07-28-19 @ 20:00):    No growth to date.    Culture - Blood (collected 07-27-19 @ 12:13)  Source: .Blood Blood  Gram Stain (07-28-19 @ 08:04):    Growth in aerobic bottle: Gram Positive Cocci in Pairs and Chains  Final Report (07-29-19 @ 13:16):    Growth in aerobic bottle: Alpha hemolytic strep    (not Strep. pneumoniae or Enterococcus)    Single set isolate, possible contaminant. Contact    Microbiology if susceptibility testing clinically    indicated. Multiple Morphological Strains    "Due to technical problems, Proteus sp. will Not be reported as part of    the BCID panel until further notice"    ***Blood Panel PCR results on this specimen are available    approximately 3 hours after the Gram stain result.***    Gram stain, PCR, and/or culture resultsmay not always    correspond due to difference in methodologies.    ************************************************************    This PCR assay was performed using [employer].    The following targets are tested for: Enterococcus,    vancomycin resistant enterococci, Listeria monocytogenes,    coagulase negative staphylococci, S. aureus,    methicillin resistant S. aureus, Streptococcus agalactiae    (Group B), S. pneumoniae, S. pyogenes (Group A),    Acinetobacter baumannii, Enterobacter cloacae, E. coli,    Klebsiella oxytoca, K. pneumoniae, Proteus sp.,    Serratia marcescens, Haemophilus influenzae,    Neisseria meningitidis, Pseudomonas aeruginosa, Candida    albicans, C. glabrata, C krusei, C parapsilosis,    C. tropicalis and the KPC resistance gene.  Organism: Blood Culture PCR (07-29-19 @ 13:16)  Organism: Blood Culture PCR (07-29-19 @ 13:16)      -  Streptococcus sp. (Not Grp A, B or S pneumoniae): Detec      Method Type: PCR    Culture - Urine (collected 07-27-19 @ 12:13)  Source: .Urine Clean Catch (Midstream)  Final Report (07-28-19 @ 12:52):    No growth    Culture - Sputum (collected 07-27-19 @ 12:05)  Source: .Sputum Sputum  Gram Stain (07-27-19 @ 22:50):    Moderate polymorphonuclear leukocytes per low power field    Few Squamous epithelial cells per low power field    Moderate Yeast like cells per oil power field    Moderate Gram Positive Cocci in Pairs and Chains per oil power field  Final Report (07-29-19 @ 16:56):    Normal Respiratory Jenny present    Culture - Urine (collected 07-25-19 @ 12:30)  Source: .Urine Clean Catch (Midstream)  Final Report (07-26-19 @ 12:45):    No growth    Culture - Blood (collected 07-25-19 @ 11:20)  Source: .Blood Blood-Peripheral  Final Report (07-30-19 @ 16:01):    No growth at 5 days.    Culture - Blood (collected 07-25-19 @ 11:20)  Source: .Blood Blood-Peripheral  Final Report (07-30-19 @ 16:01):    No growth at 5 days.      VITALS:  T(C): 37.1 (08-01-19 @ 07:00), Max: 37.4 (07-31-19 @ 17:51)  T(F): 98.7 (08-01-19 @ 07:00), Max: 99.4 (07-31-19 @ 17:51)  HR: 99 (08-01-19 @ 07:00) (99 - 116)  BP: 149/74 (08-01-19 @ 07:00) (140/76 - 150/83)  BP(mean): 106 (07-31-19 @ 14:00) (94 - 106)  ABP: --  ABP(mean): --  RR: 15 (08-01-19 @ 07:00) (15 - 94)  SpO2: 94% (07-31-19 @ 22:00) (94% - 96%)  CVP(mm Hg): --  CVP(cm H2O): --    Ins and Outs     07-31-19 @ 07:01  -  08-01-19 @ 07:00  --------------------------------------------------------  IN: 2305 mL / OUT: 2300 mL / NET: 5 mL    08-01-19 @ 07:01  -  08-01-19 @ 12:02  --------------------------------------------------------  IN: 935 mL / OUT: 0 mL / NET: 935 mL        Height (cm): 160 (07-31-19 @ 17:51)  Weight (kg): 96.8 (07-31-19 @ 17:51)  BMI (kg/m2): 37.8 (07-31-19 @ 17:51)        I&O's Detail    31 Jul 2019 07:01  -  01 Aug 2019 07:00  --------------------------------------------------------  IN:    lactated ringers.: 1425 mL    Oral Fluid: 500 mL    pantoprazole Infusion: 180 mL    Solution: 200 mL  Total IN: 2305 mL    OUT:    Indwelling Catheter - Urethral: 2300 mL  Total OUT: 2300 mL    Total NET: 5 mL      01 Aug 2019 07:01  -  01 Aug 2019 12:02  --------------------------------------------------------  IN:    lactated ringers.: 375 mL    Oral Fluid: 360 mL    Solution: 200 mL  Total IN: 935 mL    OUT:  Total OUT: 0 mL    Total NET: 935 mL

## 2019-08-01 NOTE — PROGRESS NOTE ADULT - ASSESSMENT
A/P : 79 yo M with PMHx of Alzheimer Dementia, gout, HTN brought in by wife from home for declining mental status and difficulty ambulating x 1 week. History obtained from pt's wife at bedside. Pt has known dementia but over the past week, his wife has noticed that he had difficulty ambulating with swelling of both feet, decreased PO intake and worsening agitation.  Alzheimer's dementia with behavioral disturbance.  CT head negative for acute pathology, worsening agitation maybe r/t worsening dementia.   s/p Acute respiratory failure Due to  Aspiration Pneumonitis    Severe Sepsis due to Aspiration PNA and Streptococcus bacteremia, on IV antbx     Upper GIB with Acute blood loss anemia s/p EGD s/p Clips for visible vessel ulcer , f/b GI  palliative ; met with wife at bedside , Wife is primary caregiver of pt at home, reviewed pts history, wife reports pt has had dementia for "many years" "more than 7".  Reports she has been able to care for him at home,  but this past year there has been a significant decline mentally and it is causing a physical decline as well. Began discussing disease trajectory of dementia, wife stated she understood. Wife not receptive at this point to have full goc discussion, she agreed to meet again to continue discussion. Consider family meeting, and cont supportive  care. Will follow w/ med team ,monitor pts clinical course. On going goc discussion.

## 2019-08-02 LAB
ANION GAP SERPL CALC-SCNC: 11 MMOL/L — SIGNIFICANT CHANGE UP (ref 5–17)
ANION GAP SERPL CALC-SCNC: 9 MMOL/L — SIGNIFICANT CHANGE UP (ref 5–17)
ANISOCYTOSIS BLD QL: SLIGHT — SIGNIFICANT CHANGE UP
APPEARANCE UR: CLEAR — SIGNIFICANT CHANGE UP
BACTERIA # UR AUTO: ABNORMAL /HPF
BASOPHILS # BLD AUTO: 0 K/UL — SIGNIFICANT CHANGE UP (ref 0–0.2)
BASOPHILS NFR BLD AUTO: 0 % — SIGNIFICANT CHANGE UP (ref 0–2)
BILIRUB UR-MCNC: NEGATIVE — SIGNIFICANT CHANGE UP
BUN SERPL-MCNC: 13 MG/DL — SIGNIFICANT CHANGE UP (ref 7–23)
BUN SERPL-MCNC: 14 MG/DL — SIGNIFICANT CHANGE UP (ref 7–23)
CALCIUM SERPL-MCNC: 8.1 MG/DL — LOW (ref 8.4–10.5)
CALCIUM SERPL-MCNC: 8.3 MG/DL — LOW (ref 8.4–10.5)
CHLORIDE SERPL-SCNC: 105 MMOL/L — SIGNIFICANT CHANGE UP (ref 96–108)
CHLORIDE SERPL-SCNC: 105 MMOL/L — SIGNIFICANT CHANGE UP (ref 96–108)
CO2 SERPL-SCNC: 23 MMOL/L — SIGNIFICANT CHANGE UP (ref 22–31)
CO2 SERPL-SCNC: 27 MMOL/L — SIGNIFICANT CHANGE UP (ref 22–31)
COLOR SPEC: YELLOW — SIGNIFICANT CHANGE UP
CREAT SERPL-MCNC: 1.14 MG/DL — SIGNIFICANT CHANGE UP (ref 0.5–1.3)
CREAT SERPL-MCNC: 1.38 MG/DL — HIGH (ref 0.5–1.3)
DIFF PNL FLD: ABNORMAL
EOSINOPHIL # BLD AUTO: 0.31 K/UL — SIGNIFICANT CHANGE UP (ref 0–0.5)
EOSINOPHIL NFR BLD AUTO: 3 % — SIGNIFICANT CHANGE UP (ref 0–6)
EPI CELLS # UR: SIGNIFICANT CHANGE UP
GLUCOSE BLDC GLUCOMTR-MCNC: 110 MG/DL — HIGH (ref 70–99)
GLUCOSE BLDC GLUCOMTR-MCNC: 135 MG/DL — HIGH (ref 70–99)
GLUCOSE BLDC GLUCOMTR-MCNC: 140 MG/DL — HIGH (ref 70–99)
GLUCOSE BLDC GLUCOMTR-MCNC: 165 MG/DL — HIGH (ref 70–99)
GLUCOSE SERPL-MCNC: 137 MG/DL — HIGH (ref 70–99)
GLUCOSE SERPL-MCNC: 174 MG/DL — HIGH (ref 70–99)
GLUCOSE UR QL: NEGATIVE — SIGNIFICANT CHANGE UP
HCT VFR BLD CALC: 31.8 % — LOW (ref 39–50)
HCT VFR BLD CALC: 33.1 % — LOW (ref 39–50)
HGB BLD-MCNC: 10.5 G/DL — LOW (ref 13–17)
HGB BLD-MCNC: 10.8 G/DL — LOW (ref 13–17)
KETONES UR-MCNC: NEGATIVE — SIGNIFICANT CHANGE UP
LACTATE SERPL-SCNC: 1 MMOL/L — SIGNIFICANT CHANGE UP (ref 0.7–2)
LEUKOCYTE ESTERASE UR-ACNC: ABNORMAL
LYMPHOCYTES # BLD AUTO: 1.53 K/UL — SIGNIFICANT CHANGE UP (ref 1–3.3)
LYMPHOCYTES # BLD AUTO: 15 % — SIGNIFICANT CHANGE UP (ref 13–44)
MANUAL SMEAR VERIFICATION: SIGNIFICANT CHANGE UP
MCHC RBC-ENTMCNC: 28.9 PG — SIGNIFICANT CHANGE UP (ref 27–34)
MCHC RBC-ENTMCNC: 29.7 PG — SIGNIFICANT CHANGE UP (ref 27–34)
MCHC RBC-ENTMCNC: 32.6 GM/DL — SIGNIFICANT CHANGE UP (ref 32–36)
MCHC RBC-ENTMCNC: 33 GM/DL — SIGNIFICANT CHANGE UP (ref 32–36)
MCV RBC AUTO: 88.5 FL — SIGNIFICANT CHANGE UP (ref 80–100)
MCV RBC AUTO: 89.8 FL — SIGNIFICANT CHANGE UP (ref 80–100)
METAMYELOCYTES # FLD: 1 % — HIGH (ref 0–0)
MONOCYTES # BLD AUTO: 0.72 K/UL — SIGNIFICANT CHANGE UP (ref 0–0.9)
MONOCYTES NFR BLD AUTO: 7 % — SIGNIFICANT CHANGE UP (ref 2–14)
MYELOCYTES NFR BLD: 2 % — HIGH (ref 0–0)
NEUTROPHILS # BLD AUTO: 7.36 K/UL — SIGNIFICANT CHANGE UP (ref 1.8–7.4)
NEUTROPHILS NFR BLD AUTO: 70 % — SIGNIFICANT CHANGE UP (ref 43–77)
NEUTS BAND # BLD: 2 % — SIGNIFICANT CHANGE UP (ref 0–8)
NITRITE UR-MCNC: NEGATIVE — SIGNIFICANT CHANGE UP
NRBC # BLD: 0 /100 WBCS — SIGNIFICANT CHANGE UP (ref 0–0)
NRBC # BLD: 0 /100 — SIGNIFICANT CHANGE UP (ref 0–0)
PH UR: 6 — SIGNIFICANT CHANGE UP (ref 5–8)
PLAT MORPH BLD: NORMAL — SIGNIFICANT CHANGE UP
PLATELET # BLD AUTO: 228 K/UL — SIGNIFICANT CHANGE UP (ref 150–400)
PLATELET # BLD AUTO: 259 K/UL — SIGNIFICANT CHANGE UP (ref 150–400)
POTASSIUM SERPL-MCNC: 3.6 MMOL/L — SIGNIFICANT CHANGE UP (ref 3.5–5.3)
POTASSIUM SERPL-MCNC: 3.8 MMOL/L — SIGNIFICANT CHANGE UP (ref 3.5–5.3)
POTASSIUM SERPL-SCNC: 3.6 MMOL/L — SIGNIFICANT CHANGE UP (ref 3.5–5.3)
POTASSIUM SERPL-SCNC: 3.8 MMOL/L — SIGNIFICANT CHANGE UP (ref 3.5–5.3)
PROT UR-MCNC: 100
RBC # BLD: 3.54 M/UL — LOW (ref 4.2–5.8)
RBC # BLD: 3.74 M/UL — LOW (ref 4.2–5.8)
RBC # FLD: 12.9 % — SIGNIFICANT CHANGE UP (ref 10.3–14.5)
RBC # FLD: 13.1 % — SIGNIFICANT CHANGE UP (ref 10.3–14.5)
RBC BLD AUTO: ABNORMAL
RBC CASTS # UR COMP ASSIST: >50 /HPF (ref 0–4)
SODIUM SERPL-SCNC: 139 MMOL/L — SIGNIFICANT CHANGE UP (ref 135–145)
SODIUM SERPL-SCNC: 141 MMOL/L — SIGNIFICANT CHANGE UP (ref 135–145)
SP GR SPEC: 1.01 — SIGNIFICANT CHANGE UP (ref 1.01–1.02)
UROBILINOGEN FLD QL: NEGATIVE — SIGNIFICANT CHANGE UP
WBC # BLD: 10.22 K/UL — SIGNIFICANT CHANGE UP (ref 3.8–10.5)
WBC # BLD: 11.13 K/UL — HIGH (ref 3.8–10.5)
WBC # FLD AUTO: 10.22 K/UL — SIGNIFICANT CHANGE UP (ref 3.8–10.5)
WBC # FLD AUTO: 11.13 K/UL — HIGH (ref 3.8–10.5)
WBC UR QL: ABNORMAL /HPF (ref 0–5)

## 2019-08-02 PROCEDURE — 99497 ADVNCD CARE PLAN 30 MIN: CPT | Mod: 25

## 2019-08-02 PROCEDURE — 71045 X-RAY EXAM CHEST 1 VIEW: CPT | Mod: 26

## 2019-08-02 PROCEDURE — 99223 1ST HOSP IP/OBS HIGH 75: CPT

## 2019-08-02 PROCEDURE — 93970 EXTREMITY STUDY: CPT | Mod: 26

## 2019-08-02 PROCEDURE — 93010 ELECTROCARDIOGRAM REPORT: CPT

## 2019-08-02 PROCEDURE — 99232 SBSQ HOSP IP/OBS MODERATE 35: CPT

## 2019-08-02 PROCEDURE — 99233 SBSQ HOSP IP/OBS HIGH 50: CPT

## 2019-08-02 RX ORDER — ACETAMINOPHEN 500 MG
650 TABLET ORAL EVERY 6 HOURS
Refills: 0 | Status: DISCONTINUED | OUTPATIENT
Start: 2019-08-02 | End: 2019-08-09

## 2019-08-02 RX ORDER — SODIUM CHLORIDE 9 MG/ML
1000 INJECTION INTRAMUSCULAR; INTRAVENOUS; SUBCUTANEOUS
Refills: 0 | Status: DISCONTINUED | OUTPATIENT
Start: 2019-08-02 | End: 2019-08-06

## 2019-08-02 RX ADMIN — SODIUM CHLORIDE 100 MILLILITER(S): 9 INJECTION INTRAMUSCULAR; INTRAVENOUS; SUBCUTANEOUS at 17:18

## 2019-08-02 RX ADMIN — Medication 0.5 MILLIGRAM(S): at 18:35

## 2019-08-02 RX ADMIN — PANTOPRAZOLE SODIUM 40 MILLIGRAM(S): 20 TABLET, DELAYED RELEASE ORAL at 05:33

## 2019-08-02 RX ADMIN — AMLODIPINE BESYLATE 5 MILLIGRAM(S): 2.5 TABLET ORAL at 05:33

## 2019-08-02 RX ADMIN — SODIUM CHLORIDE 100 MILLILITER(S): 9 INJECTION INTRAMUSCULAR; INTRAVENOUS; SUBCUTANEOUS at 23:03

## 2019-08-02 RX ADMIN — Medication 650 MILLIGRAM(S): at 18:38

## 2019-08-02 RX ADMIN — LISINOPRIL 40 MILLIGRAM(S): 2.5 TABLET ORAL at 05:33

## 2019-08-02 RX ADMIN — LATANOPROST 1 DROP(S): 0.05 SOLUTION/ DROPS OPHTHALMIC; TOPICAL at 23:04

## 2019-08-02 RX ADMIN — TAMSULOSIN HYDROCHLORIDE 0.4 MILLIGRAM(S): 0.4 CAPSULE ORAL at 23:04

## 2019-08-02 RX ADMIN — Medication 0.5 MILLIGRAM(S): at 11:20

## 2019-08-02 RX ADMIN — Medication 650 MILLIGRAM(S): at 21:00

## 2019-08-02 RX ADMIN — PANTOPRAZOLE SODIUM 40 MILLIGRAM(S): 20 TABLET, DELAYED RELEASE ORAL at 17:17

## 2019-08-02 NOTE — CONSULT NOTE ADULT - SUBJECTIVE AND OBJECTIVE BOX
This is a 79 yo male with PMHx of Alzheimer Dementia, gout, HTN brought in by wife from home for declining mental status and difficulty ambulating x 1 week to [facility]'s ER on 7/25/19. Wife reports that patient has known dementia but the week prior to presenting in the ED, she had noticed that he had difficulty ambulating with swelling of both feet, decreased PO intake and worsening agitation. Pt was seen by his PCP, Dr. Adame who started treatment for gout and sent Rx for steroids but his wife has not picked up the prescription.  He had been taking Alprazolam for many years, but recently also prescribed Zolpidem at bedtime as needed for increased agitation. His wife also noticed decreased urine output, denied recent illness, falls, LOC, fevers, chills, nausea, vomiting, or diarrhea. Patient admitted to med/surg unit. Neurology consulted and felt new gait dysfunction not consistent with CVA. Recommended to continue to wean off of medications (polypharmacy noted). Suggests to r/o gout and degenerative joint disease as cause for gait disorder. No further neuro w/u needed.   On the 27th of July, a RRT was call for respiratory distress. Patient began vomiting dark brown material. Patient was intubated at bedside and moved to ICU for further management. Underwent EGD on the 29th but did not find source of bleed. ID consulted for aspiration pna and sepsis and recommended to continue IV abx.  Patient successfully extubated on July 30th.   Swallow eval done on the 31st with recommendations to start pureed diet with thin liquids. PT and OT have seen patient last on July 31st. Currently PT was hold for today due to dopplers ordered to r/o DVT of LEs.:results are negative for DVTs.  Patient has pain in the left LE and xray was negative except for some soft tissue swelling. Uric acid was normal.     With PT:   Bed Mobility  Bed Mobility Training Supine-to-Sit: maximum assist (25% patient effort);  2 person assist  Bed Mobility Training Limitations: decreased ability to use legs for bridging/pushing    Sit-Stand Transfer Training  Transfer Training Sit-to-Stand Transfer: maximum assist (25% patient effort);  2 person assist;  full weight-bearing   rolling walker  Transfer Training Stand-to-Sit Transfer: maximum assist (25% patient effort);  2 person assist;  full weight-bearing   rolling walker    Gait Training  Gait Training: unable to perform      With OT:   Bed Mobility: Supine to Sit:     · Level of [place]	unable to perform; Unable to perform any transfers due to pain in left ankle	    Upper Body Dressing Training:     · Level of [place]	unable to perform; Total assist due to decrease cognition	    Lower Body Dressing Training:     · Level of [place]	unable to perform; Total assist for LE dressing due to decrease comprehension and decrease strength	    PM&R consulted to weigh in on disposition planning.     PAST MEDICAL & SURGICAL HISTORY:  Gout  Hypertension  Dementia  H/O inguinal hernia repair      Allergies  No Known Allergies    Social Hx:       TODAY'S SUBJECTIVE & REVIEW OF SYMPTOMS:    [   ] Constitutional WNL  [   ] Cardio WNL  [   ] Resp WNL  [   ] GI WNL  [   ] Heme WNL  [   ] Endo WNL  [   ] Skin WNL  [   ] MSK WNL  [   ] Neuro WNL  [   ] Cognitive WNL  [   ] Psych WNL        08-02    139  |  105  |  13  ----------------------------<  137<H>  3.8   |  23  |  1.14    Ca    8.3<L>      02 Aug 2019 08:57  Mg     1.8     08-01                          10.8   10.22 )-----------( 228      ( 02 Aug 2019 08:57 )             33.1   < from: US Duplex Venous Lower Ext Complete, Bilateral (08.02.19 @ 13:58) >    EXAM:  US DPLX LWR EXT VEINS COMPL BI      PROCEDURE DATE:  08/02/2019        INTERPRETATION:  CLINICAL INFORMATION: Bilateral lower extreme pain and   swelling of unknown severity or duration. Evaluate for DVT bilaterally.   No additional information.    COMPARISON: 3/13/2013    TECHNIQUE: Duplex sonography of the BILATERAL LOWER extremity veins with   color and spectral Doppler, with and without compression.      FINDINGS:    There is normal compressibility of the bilateral common femoral, femoral   and popliteal veins.     Doppler examination shows normal spontaneous and phasic flow.    There was limited evaluation of the calf veins due to technical factors   and patient's physical limitations.    IMPRESSION:     No evidence of deep venous thrombosis in either lower extremity.        TIFFANIE WHITAKER M.D., ATTENDING RADIOLOGIST  This document has been electronically signed. Aug  2 2019  2:05PM        < end of copied text >      Uric Acid, Serum (07.31.19 @ 11:55)    Uric Acid, Serum: 7.0 mg/dL          Physical Examination:   Vital Signs Last 24 Hrs  T(C): 36.7 (02 Aug 2019 05:23), Max: 36.7 (01 Aug 2019 15:31)  T(F): 98 (02 Aug 2019 05:23), Max: 98.1 (01 Aug 2019 15:31)  HR: 94 (02 Aug 2019 05:23) (92 - 104)  BP: 156/79 (02 Aug 2019 05:23) (149/77 - 156/79)  RR: 15 (02 Aug 2019 05:23) (15 - 15)  SpO2: 96% (02 Aug 2019 05:23) (95% - 96%)    Mental Status - Patient is alert, awake, oriented X3. Speech is fluent, able to  name and repeat. Normal attention and concentration.  Follows commands well and able to answer questions appropriately. Mood and affect  normal.  Cranial Nerves - VFF, PERRL, EOMI, V1-V3 intact, no gross facial asymmetry, no dysarthria, no dysphagia,  Motor Exam -   Right upper  Left upper  Right lower  Left lower   Normal muscle bulk/tone  Sensory    Intact to light touch and pinprick bilaterally. Normal JPS, vibration.Normal cortical sensation  Coord: FTN intact bilaterally   No tremors  Gait -  normal , no ataxia   DTS Reflexes:           Toes are flexor                             GENERAL Exam:     Nontoxic , No Acute Distress 	  HEENT:  normocephalic, atraumatic		  LUNGS:	Clear bilaterally  No Wheeze  Decreased bilaterally  HEART:	 Normal S1S2   No murmur RRR      	  GI/ ABDOMEN:  Soft  Non tender  EXTREMITIES:   No Edema  No Clubbing  No Cyanosis No Edema  MUSCULOSKELETAL: Normal Range of Motion	   SKIN:      Normal   No Ecchymosis This is a 79 yo male with PMHx of Alzheimer Dementia, gout, HTN brought in by wife from home for declining mental status and difficulty ambulating x 1 week to [facility]'s ER on 7/25/19. Wife reports that patient has known dementia but the week prior to presenting in the ED, she had noticed that he had difficulty ambulating with swelling of both feet, decreased PO intake and worsening agitation. Pt was seen by his PCP, Dr. Adame who started treatment for gout and sent Rx for steroids but his wife has not picked up the prescription.  He had been taking Alprazolam for many years, but recently also prescribed Zolpidem at bedtime as needed for increased agitation. His wife also noticed decreased urine output, denied recent illness, falls, LOC, fevers, chills, nausea, vomiting, or diarrhea. Patient admitted to med/surg unit. Neurology consulted and felt new gait dysfunction not consistent with CVA. Recommended to continue to wean off of medications (polypharmacy noted). Suggests to r/o gout and degenerative joint disease as cause for gait disorder. No further neuro w/u needed.   On the 27th of July, a RRT was call for respiratory distress. Patient began vomiting dark brown material. Patient was intubated at bedside and moved to ICU for further management. Underwent EGD on the 29th but did not find source of bleed. ID consulted for aspiration pna and sepsis and recommended to continue IV abx.  Patient successfully extubated on July 30th.   Swallow eval done on the 31st with recommendations to start pureed diet with thin liquids. PT and OT have seen patient last on July 31st. Currently PT was hold for today due to dopplers ordered to r/o DVT of LEs.:results are negative for DVTs.  Patient has pain in the left LE and xray was negative except for some soft tissue swelling. Uric acid was normal.     With PT:   Bed Mobility  Bed Mobility Training Supine-to-Sit: maximum assist (25% patient effort);  2 person assist  Bed Mobility Training Limitations: decreased ability to use legs for bridging/pushing    Sit-Stand Transfer Training  Transfer Training Sit-to-Stand Transfer: maximum assist (25% patient effort);  2 person assist;  full weight-bearing   rolling walker  Transfer Training Stand-to-Sit Transfer: maximum assist (25% patient effort);  2 person assist;  full weight-bearing   rolling walker    Gait Training  Gait Training: unable to perform      With OT:   Bed Mobility: Supine to Sit:     · Level of [place]	unable to perform; Unable to perform any transfers due to pain in left ankle	    Upper Body Dressing Training:     · Level of [place]	unable to perform; Total assist due to decrease cognition	    Lower Body Dressing Training:     · Level of [place]	unable to perform; Total assist for LE dressing due to decrease comprehension and decrease strength	    PM&R consulted to weigh in on disposition planning.     PAST MEDICAL & SURGICAL HISTORY:  Gout  Hypertension  Dementia  H/O inguinal hernia repair      Allergies  No Known Allergies    Social Hx: [place]. Uses entrance through garage and lives on 1st floor. No use of AD PTA per patient's wife. Wife states that patient was able to dress and bath and feed himself. She is with him 24/7 due to dementia and need for supervision for safety.       TODAY'S SUBJECTIVE & REVIEW OF SYMPTOMS:  When patient asked if he has any pain, he can state that he does have pain but cannot describe where the pain is located. Patient just shrugs his shoulders when asked if SOB, CP, HA, dizziness, or abdominal pain.   Wife states that he has not complained of any pain today.   Indwelling redmond in place and wife wondering when urology will come to see patient-hospitalist PA aware and states urology is coming this afternoon.       08-02    139  |  105  |  13  ----------------------------<  137<H>  3.8   |  23  |  1.14    Ca    8.3<L>      02 Aug 2019 08:57  Mg     1.8     08-01                          10.8   10.22 )-----------( 228      ( 02 Aug 2019 08:57 )             33.1   < from: US Duplex Venous Lower Ext Complete, Bilateral (08.02.19 @ 13:58) >    EXAM:  US DPLX LWR EXT VEINS COMPL BI      PROCEDURE DATE:  08/02/2019        INTERPRETATION:  CLINICAL INFORMATION: Bilateral lower extreme pain and   swelling of unknown severity or duration. Evaluate for DVT bilaterally.   No additional information.    COMPARISON: 3/13/2013    TECHNIQUE: Duplex sonography of the BILATERAL LOWER extremity veins with   color and spectral Doppler, with and without compression.      FINDINGS:    There is normal compressibility of the bilateral common femoral, femoral   and popliteal veins.     Doppler examination shows normal spontaneous and phasic flow.    There was limited evaluation of the calf veins due to technical factors   and patient's physical limitations.    IMPRESSION:     No evidence of deep venous thrombosis in either lower extremity.        TIFFANIE WHITAKER M.D., ATTENDING RADIOLOGIST  This document has been electronically signed. Aug  2 2019  2:05PM        < end of copied text >      Uric Acid, Serum (07.31.19 @ 11:55)    Uric Acid, Serum: 7.0 mg/dL          Physical Examination:   Vital Signs Last 24 Hrs  T(C): 36.7 (02 Aug 2019 05:23), Max: 36.7 (01 Aug 2019 15:31)  T(F): 98 (02 Aug 2019 05:23), Max: 98.1 (01 Aug 2019 15:31)  HR: 94 (02 Aug 2019 05:23) (92 - 104)  BP: 156/79 (02 Aug 2019 05:23) (149/77 - 156/79)  RR: 15 (02 Aug 2019 05:23) (15 - 15)  SpO2: 96% (02 Aug 2019 05:23) (95% - 96%)    Mental Status - Patient states that his name is Rudy Florence. When asked a second time, still unable to give his name.   Resting comfortably in bed. Alert. Unable to answer any questions about orientation.   Cranial Nerves -unable to assess due to cooperation   Motor Exam -   spontaneous movement of all extremities   Patient was compliant with testing biceps and hand grasps, which were 5/5, however refused to have further muscle strength testing  Normal muscle bulk/tone  Sensory   Unable to test due to cooperation   Coord: Unable to test due to cooperation  No tremors  Gait -  unable to assess   DTS Reflexes:  equal bilat          Toes are flexor                             GENERAL Exam:     Nontoxic , No Acute Distress 	  HEENT:  normocephalic, atraumatic		  LUNGS:	Clear bilaterally  N  HEART:	 Normal S1S2  	  GI/ ABDOMEN:  Soft  Non tender +BS  EXTREMITIES:   slight edema around left ankle

## 2019-08-02 NOTE — GOALS OF CARE CONVERSATION - PERSONAL ADVANCE DIRECTIVE - CONVERSATION DETAILS
met w/ wife at bedside again this AM, she reports pt is being pt being eval for AR vs LUPE. Wife concerned that pt is not moving much on his own, and concerned how she will be able  to care for him going forward. Supportive care given. We began to discuss if pts status should  decline further, what interventions wife would want going forward. Wife stated she does not think she would want much done, but admits this is "too much for her" , and she wants to speak with her daughter regarding these decisions. We reviewed the MOLST form, and copy given to wife to review/read. I encouraged wife to discuss this with her daughter and that I would follow up with her for any questions/concerns and would review the MOLST form with her.

## 2019-08-02 NOTE — CONSULT NOTE ADULT - ASSESSMENT
This is a 79 yo male with PMHx of Alzheimer Dementia, gout, HTN brought in by wife from home for declining mental status and difficulty ambulating x 1 week who developed GIB, was intubated, developed aspiration pneumonia and sepsis now with increased weakness and gait and ADLs deficits.       Recommendations: This is a 79 yo male with PMHx of Alzheimer Dementia, gout, HTN brought in by wife from home for declining mental status and difficulty ambulating x 1 week who developed GIB, was intubated, developed aspiration pneumonia and sepsis now with increased weakness and gait and ADLs deficits.       Recommendations:   Continue bedside therapies. Continue medical management per primary, neurology and ID teams.   Patient will not be able to participate in an intensive inpatient acute rehab program. Recommend LUPE transfer once deemed medically stable to work on strengthening, endurance, transfers, gait, bed [id number], ADLs.   Wife is agreeable with above plan.

## 2019-08-02 NOTE — PROGRESS NOTE ADULT - ASSESSMENT
Physical Exam    GENERAL:               Alert,  No acute distress.    HEENT:                    No JVD, dry MM  PULM:                     Bilateral air entry, Clear to auscultation bilaterally, no significant sputum production, No Rales, No Rhonchi, No Wheezing  CVS:                         S1, S2,  No Murmur tachy  ABD:                        Soft, distended, nontender, normoactive bowel sounds,   EXT:                         No edema, nontender, No Cyanosis or Clubbing , left ankle swelling mild with tenderness. trace warmth,  NEURO:                  Alert,  interactive, nonfocal, follows commands  PSYC:                      Calm,       Assessment  1. Acute respiratory failure Due to  Aspiration Pneumonitis Resolved  2. Severe Sepsis due to Aspiration PNA and Streptococcus bacteremia Resolved  3. Upper GIB with Acute blood loss anemia s/p EGD s/p Clip for visible vessel ulcer Resolved  4. Acute renal failure due to  Hypotension Resolved with baseline cr 1.2-1.4   5. New urinary retention   6. New tachycardia - suspect from anxiety  5. Underlying Alzheimer's disease       Plan  Finished abx, off IVF, chc stable  Appreciate S&S f/u - PUREE with THIN liquids as tolerated  Left ankle xray soft tissue swelling with normal uric acid suspect trauma   PPI BID  Check LE duplex r/o dvt due to tachycardia  give xanax - suspect pt anxious   Hydralazine PRN  continue flomax - urology follow up    trend CBC daily      Family Updated: 		family, wife                                 Date:  8/1      Sedation & Analgesia:	as above   Diet/Nutrition:		oral diet  GI PPx:			PPI BID    DVT Ppx:		venodynes      Activity:		      Bedrest   Head of Bed:               35-45  Glycemic Control:        n/a    Lines: PIV  EDWARDS: 		        [ x] YES [ ] NO  		                                       DATE INSERTED:	7/30	            REMOVE:  [x ] YES [ ] NO  can consider voiding trial    Disposition: continue care on medical floor d/w Dr. Arevalo, no active ccm issues reconsult icu team as needed

## 2019-08-02 NOTE — PROGRESS NOTE ADULT - SUBJECTIVE AND OBJECTIVE BOX
Patient is a 78y old  Male who presents with a chief complaint of Worsening agitation and difficulty ambulating (02 Aug 2019 11:23). Wife at bedside.  Redmond replaced yesterday for urinary retention       Patient seen and examined at bedside.    ALLERGIES:  No Known Allergies    MEDICATIONS  (STANDING):  amLODIPine   Tablet 5 milliGRAM(s) Oral daily  dextrose 5%. 1000 milliLiter(s) (50 mL/Hr) IV Continuous <Continuous>  dextrose 50% Injectable 12.5 Gram(s) IV Push once  dextrose 50% Injectable 25 Gram(s) IV Push once  dextrose 50% Injectable 25 Gram(s) IV Push once  insulin lispro (HumaLOG) corrective regimen sliding scale   SubCutaneous Before meals and at bedtime  latanoprost 0.005% Ophthalmic Solution 1 Drop(s) Both EYES at bedtime  lisinopril 40 milliGRAM(s) Oral daily  pantoprazole    Tablet 40 milliGRAM(s) Oral two times a day  tamsulosin 0.4 milliGRAM(s) Oral at bedtime    MEDICATIONS  (PRN):  acetaminophen   Tablet .. 650 milliGRAM(s) Oral every 6 hours PRN Mild Pain (1 - 3)  ALPRAZolam 0.5 milliGRAM(s) Oral three times a day PRN agitation/anxiety  ondansetron Injectable 4 milliGRAM(s) IV Push every 6 hours PRN Nausea and/or Vomiting  sodium chloride 0.9% lock flush 10 milliLiter(s) IV Push every 1 hour PRN Pre/post blood products, medications, blood draw, and to maintain line patency  traZODone 100 milliGRAM(s) Oral at bedtime PRN sleep/agitation    Vital Signs Last 24 Hrs  T(F): 98 (02 Aug 2019 05:23), Max: 98.1 (01 Aug 2019 15:31)  HR: 94 (02 Aug 2019 05:23) (92 - 104)  BP: 156/79 (02 Aug 2019 05:23) (149/77 - 156/79)  RR: 15 (02 Aug 2019 05:23) (15 - 15)  SpO2: 96% (02 Aug 2019 05:23) (95% - 96%)  I&O's Summary    01 Aug 2019 07:01  -  02 Aug 2019 07:00  --------------------------------------------------------  IN: 1385 mL / OUT: 1200 mL / NET: 185 mL    02 Aug 2019 07:01  -  02 Aug 2019 11:37  --------------------------------------------------------  IN: 400 mL / OUT: 0 mL / NET: 400 mL      BMI (kg/m2): 37.8 (07-31-19 @ 17:51)  PHYSICAL EXAM:  General: NAD, awake, alert  ENT: MMM  Neck: Supple, No JVD  Lungs: Clear to auscultation bilaterally  Cardio: RRR, S1/S2,  Abdomen: Soft, Nontender, Nondistended; Bowel sounds present  : redmond with light yellow urine--some bright red blood in redmond bag probably due to traumatic redmond placement   Extremities: No calf tenderness, No pitting edema    LABS:                        10.8   10.22 )-----------( 228      ( 02 Aug 2019 08:57 )             33.1       08-02    139  |  105  |  13  ----------------------------<  137  3.8   |  23  |  1.14    Ca    8.3      02 Aug 2019 08:57  Phos  2.8     07-31  Mg     1.8     08-01       eGFR if Non African American: 62 mL/min/1.73M2 (08-02-19 @ 08:57)  eGFR if : 72 mL/min/1.73M2 (08-02-19 @ 08:57)                           POCT Blood Glucose.: 165 mg/dL (02 Aug 2019 11:19)  POCT Blood Glucose.: 135 mg/dL (02 Aug 2019 07:48)  POCT Blood Glucose.: 113 mg/dL (01 Aug 2019 22:27)  POCT Blood Glucose.: 122 mg/dL (01 Aug 2019 17:16)  POCT Blood Glucose.: 124 mg/dL (01 Aug 2019 12:00)    07-28 [id number] 5.7          RADIOLOGY & ADDITIONAL TESTS:    Care Discussed with Consultants/Other Providers:

## 2019-08-02 NOTE — PROGRESS NOTE ADULT - SUBJECTIVE AND OBJECTIVE BOX
Progress: f/u palliative, pt in bed, awake, confused, wife at bedside, agrees he is confused.       Present Symptoms:   Dyspnea: mild  Nausea/Vomiting: no  Anxiety:  mild  Depressed Mood: yes  Fatigue: yes  Loss of appetite: no  Pain:    denies                location:   Review of Systems:  Unable to obtain due to poor mentation]    MEDICATIONS  (STANDING):  amLODIPine   Tablet 5 milliGRAM(s) Oral daily  dextrose 5%. 1000 milliLiter(s) (50 mL/Hr) IV Continuous <Continuous>  dextrose 50% Injectable 12.5 Gram(s) IV Push once  dextrose 50% Injectable 25 Gram(s) IV Push once  dextrose 50% Injectable 25 Gram(s) IV Push once  insulin lispro (HumaLOG) corrective regimen sliding scale   SubCutaneous Before meals and at bedtime  latanoprost 0.005% Ophthalmic Solution 1 Drop(s) Both EYES at bedtime  lisinopril 40 milliGRAM(s) Oral daily  pantoprazole    Tablet 40 milliGRAM(s) Oral two times a day  tamsulosin 0.4 milliGRAM(s) Oral at bedtime    MEDICATIONS  (PRN):  acetaminophen   Tablet .. 650 milliGRAM(s) Oral every 6 hours PRN Mild Pain (1 - 3)  ALPRAZolam 0.5 milliGRAM(s) Oral three times a day PRN agitation/anxiety  ondansetron Injectable 4 milliGRAM(s) IV Push every 6 hours PRN Nausea and/or Vomiting  sodium chloride 0.9% lock flush 10 milliLiter(s) IV Push every 1 hour PRN Pre/post blood products, medications, blood draw, and to maintain line patency  traZODone 100 milliGRAM(s) Oral at bedtime PRN sleep/agitation      PHYSICAL EXAM:  Vital Signs Last 24 Hrs  T(C): 36.7 (02 Aug 2019 05:23), Max: 36.7 (01 Aug 2019 15:31)  T(F): 98 (02 Aug 2019 05:23), Max: 98.1 (01 Aug 2019 15:31)  HR: 94 (02 Aug 2019 05:23) (92 - 104)  BP: 156/79 (02 Aug 2019 05:23) (149/77 - 156/79)  BP(mean): --  RR: 15 (02 Aug 2019 05:23) (15 - 15)  SpO2: 96% (02 Aug 2019 05:23) (95% - 96%)  General: alert  oriented x _1      HEENT: n/c, a/t oral mucosa moist     Lungs: ess clear , dim to bases   CV: s1s2    GI: soft, n/t + bs     : nyla     Musculoskeletal: ramirez's, but weakened    Skin: normal, warm dry     Neuro: alert, oriented to self,    Oral intake ability:  oral feeding, w/ assist   Diet: soft      LABS:                          10.8   10.22 )-----------( 228      ( 02 Aug 2019 08:57 )             33.1     08-02    139  |  105  |  13  ----------------------------<  137<H>  3.8   |  23  |  1.14    Ca    8.3<L>      02 Aug 2019 08:57  Mg     1.8     08-01          RADIOLOGY & ADDITIONAL STUDIES:     ADVANCE DIRECTIVES: Full code   Advanced Care Planning discussion total time spent:

## 2019-08-02 NOTE — CHART NOTE - NSCHARTNOTEFT_GEN_A_CORE
NUTRITION FOLLOW UP    SOURCE: Patient [X)   Family [ ]    Medical Record (X)    DIET: puree c nectar thick liquids     Diet downgraded to puree c nectar thick liquids as per speech therapy on 8/1. Pt consuming about 50% of meals Would suggest adding Magic Cup BID (290kcals, 9g protein per serving). Last BM noted on 7/31.            CURRENT WEIGHT:  (7/28) 208.5lbs   (7/31) 192.6lbs   +1 edema likely contributing to weight fluctuation    PERTINENT MEDS:   Pertinent Medications: MEDICATIONS  (STANDING):  amLODIPine   Tablet 5 milliGRAM(s) Oral daily  dextrose 5%. 1000 milliLiter(s) (50 mL/Hr) IV Continuous <Continuous>  dextrose 50% Injectable 12.5 Gram(s) IV Push once  dextrose 50% Injectable 25 Gram(s) IV Push once  dextrose 50% Injectable 25 Gram(s) IV Push once  insulin lispro (HumaLOG) corrective regimen sliding scale   SubCutaneous Before meals and at bedtime  latanoprost 0.005% Ophthalmic Solution 1 Drop(s) Both EYES at bedtime  lisinopril 40 milliGRAM(s) Oral daily  pantoprazole    Tablet 40 milliGRAM(s) Oral two times a day  tamsulosin 0.4 milliGRAM(s) Oral at bedtime    MEDICATIONS  (PRN):  acetaminophen   Tablet .. 650 milliGRAM(s) Oral every 6 hours PRN Mild Pain (1 - 3)  ALPRAZolam 0.5 milliGRAM(s) Oral three times a day PRN agitation/anxiety  ondansetron Injectable 4 milliGRAM(s) IV Push every 6 hours PRN Nausea and/or Vomiting  sodium chloride 0.9% lock flush 10 milliLiter(s) IV Push every 1 hour PRN Pre/post blood products, medications, blood draw, and to maintain line patency  traZODone 100 milliGRAM(s) Oral at bedtime PRN sleep/agitation      PERTINENT LABS:  08-02 Na139 mmol/L Glu 137 mg/dL<H> K+ 3.8 mmol/L Cr  1.14 mg/dL BUN 13 mg/dL 07-31 Phos 2.8 mg/dL 07-29 Alb 2.0 g/dL<L> 07-28 [id number] 5.7 %<H>  POCT (8/2) 135mg/dl    SKIN:  no pressure ulcers  EDEMA: +1 left foot, left ankle  LAST BM: 7/31    ESTIMATED NEEDS:   [X] no change since previous assessment  [ ] recalculated:     PREVIOUS NUTRITION DIAGNOSIS:    1. Inadequate oral intake- diet now upgrade to po solids. Suggest adding Magic Cup BID to meet ENN.     NUTRITION DIAGNOSIS is :  (X)  Ongoing      (    ) Resolved,  RD will follow as per nutrition department protocol.    NEW NUTRITION DIAGNOSIS: Chewing/swallowing difficulty due to s/p extubation, edentulous state as evidenced by need for puree c nectar thick liquid consistency      NUTRITION RECOMMENDATIONS:   1. Add Magic Cup BID. Provide 1:1 assistance with meals   2. Diet consistency as per speech therapy   3. Weekly weights     MONITORING AND EVALUATION:   1. Tolerance to diet prescription   2. PO intake  3. Weights  4. Labs  5. Follow Up per protocol     RD to remain available   Prema Adames RDN   Pager #[phone]

## 2019-08-02 NOTE — PROGRESS NOTE ADULT - ASSESSMENT
77 yo M, dementia, here with agitation, functional decline  Distention, coffee ground emesis, s/p EGD- clipped gastric ulcer  Remains extubated, alert, no distress  Afebrile for a few days  Minimal leukocytosis  Sputum normal nghia only  Alpha Strep in single Bld Cx likely a contaminant  CXR now with evolution of R infiltrate, confirming clinical suspicion of aspiration pneumonia  He is comfortable on RA  wife reports he has been told of a ? urethral stricture, it has never been corrected although she had seen a urologist last year who advised some intervention.  Plan:  monitor off antibiotics  Follow temps and CBC/diff   ID issues reviewed with wife 79 yo M, dementia, here with agitation, functional decline  Distention, coffee ground emesis, s/p EGD- clipped gastric ulcer  Remains extubated, alert, no distress  Afebrile for a few days  Minimal leukocytosis  Sputum normal nghia only  Alpha Strep in single Bld Cx likely a contaminant  CXR now with evolution of R infiltrate, confirming clinical suspicion of aspiration pneumonia  He is comfortable on RA  wife reports he has been told of a ? urethral stricture, it has never been corrected although she had seen a urologist last year who advised some intervention.  He is tachycardic, not clear why  Plan:  monitor off antibiotics  Follow temps and CBC/diff   Low threshold for repeat cultures  ID issues reviewed with wife

## 2019-08-02 NOTE — PROGRESS NOTE ADULT - ASSESSMENT
A/P: 77 yo M with PMHx of Alzheimer Dementia, gout, HTN brought in by wife from home for declining mental status and difficulty ambulating x 1 week. History obtained from pt's wife at bedside. Pt has known dementia but over the past week, his wife has noticed that he had difficulty ambulating with swelling of both feet, decreased PO intake and worsening agitation.  Alzheimer's dementia with behavioral disturbance.  CT head negative for acute pathology, worsening agitation maybe r/t progressive dementia.   s/p Acute respiratory failure w/ intubation  Due to  Aspiration Pneumonitis    Severe Sepsis due to Aspiration PNA and Streptococcus bacteremia, on IV antbx. f/b ID      Upper GIB with Acute blood loss anemia s/p EGD s/p Clips for visible vessel ulcer , f/b GI  Palliative: met w/ wife at bedside this AM, she reports pt is being pt being eval for AR vs LUPE. Wife concerned that pt is not moving much on his own, and concerned how she will be able  to care for him going forward. Supportive care given. We also discussed if pts status should  decline further, what interventions wife would want going forward. Wife stated she does not think she would want much done, but she wants to speak with her daughter regarding these decisions. We discussed/reviewed the MOLST form, and copy given to wife to review/read. I encouraged wife to discuss this with her daughter and that I would follow up with her for any questions and review the MOLST form with her. Wife has my contact  info for any questions , concerns.   Will cont to follow pt w/ med team, and monitor pts clinical course. A/P: 79 yo M with PMHx of Alzheimer Dementia, gout, HTN brought in by wife from home for declining mental status and difficulty ambulating x 1 week. History obtained from pt's wife at bedside. Pt has known dementia but over the past week, his wife has noticed that he had difficulty ambulating with swelling of both feet, decreased PO intake and worsening agitation.  Alzheimer's dementia with behavioral disturbance.  CT head negative for acute pathology, worsening agitation maybe r/t progressive dementia.   s/p Acute respiratory failure w/ intubation  Due to  Aspiration Pneumonitis    Severe Sepsis due to Aspiration PNA and Streptococcus bacteremia, on IV antbx. f/b ID      Upper GIB with Acute blood loss anemia s/p EGD s/p Clips for visible vessel ulcer , f/b GI  Palliative: met w/ wife at bedside this AM, she reports pt is being pt being eval for AR vs LUPE. Wife concerned that pt is not moving much on his own, and concerned how she will be able  to care for him going forward. Supportive care given. We also discussed if pts status should  decline further, what interventions wife would want going forward. Wife stated she does not think she would want much done, but she wants to speak with her daughter regarding these decisions. We discussed/reviewed the MOLST form, and copy given to wife to review/read. I encouraged wife to discuss this with her daughter and that I would follow up with her for any questions and review the MOLST form with her. Wife has my contact  info for any questions , concerns.   Will cont to follow pt w/ med team, and monitor pts clinical course. Reviewed with Waleska Ivy

## 2019-08-02 NOTE — CHART NOTE - NSCHARTNOTEFT_GEN_A_CORE
Patient noted to be tachycardic this afternoon.  Upon my exam, HR noted to be regular at 120 bpm.  Other vitals stable. EKG with sinus tachy at 123 bpm.     Gave 1 L NS @ 100cc/hr.  Upon my reassessment, pt still noted to be tachycardic and now with 100.4 fever. Source unclear at this time  STAT CBC/BMP/lactate/blood cx and urine cx ordered. CXR ordered. D/W Dr Shea who agrees to pan culture.  Holding off on further abx now. Low threshold to restart abx if any signs of hemodynamic instability develop. STAT tylenol given now.    Also need to be concerned for PE given tachycardia, although tachycardia most likely due to fever. LE duplex neg for DVT.  Consider D dimer if tachycardia persists.    Will cont to closely monitor

## 2019-08-02 NOTE — PROGRESS NOTE ADULT - SUBJECTIVE AND OBJECTIVE BOX
CC: f/u for pneumonia    Patient reports: he is alert, redmond placed for retention    REVIEW OF SYSTEMS:  All other review of systems negative (Comprehensive ROS)    Antimicrobials Day #  :s/p 6 days of  CTX    Other Medications Reviewed    T(F): 99.2 (08-02-19 @ 15:42), Max: 99.2 (08-02-19 @ 15:42)  HR: 115 (08-02-19 @ 16:30)  BP: 166/75 (08-02-19 @ 15:42)  RR: 15 (08-02-19 @ 15:42)  SpO2: 94% (08-02-19 @ 15:42)  Wt(kg): --    PHYSICAL EXAM:  General: alert, no acute distress  Eyes:  anicteric, no conjunctival injection, no discharge  Oropharynx: no lesions or injection 	  Neck: supple, without adenopathy  Lungs: clear to auscultation  Heart: regular rate and rhythm; no murmur, rubs or gallops  Abdomen: soft, nondistended, nontender, without mass or organomegaly  Skin: no lesions  Extremities: no clubbing, cyanosis, or edema  Neurologic: alert, oriented, moves all extremities, baseline dementia   redmond    LAB RESULTS:                        10.8   10.22 )-----------( 228      ( 02 Aug 2019 08:57 )             33.1     08-02    139  |  105  |  13  ----------------------------<  137<H>  3.8   |  23  |  1.14    Ca    8.3<L>      02 Aug 2019 08:57  Mg     1.8     08-01          MICROBIOLOGY:  RECENT CULTURES:      RADIOLOGY REVIEWED:    < from: US Duplex Venous Lower Ext Complete, Bilateral (08.02.19 @ 13:58) >  IMPRESSION:     No evidence of deep venous thrombosis in either lower extremity.    < end of copied text > CC: f/u for pneumonia    Patient reports: he is alert, redmond placed for retention    REVIEW OF SYSTEMS:  All other review of systems negative (Comprehensive ROS)    Antimicrobials Day #  :s/p 6 days of  CTX    Other Medications Reviewed  MEDICATIONS  (STANDING):  amLODIPine   Tablet 5 milliGRAM(s) Oral daily  dextrose 5%. 1000 milliLiter(s) (50 mL/Hr) IV Continuous <Continuous>  dextrose 50% Injectable 12.5 Gram(s) IV Push once  dextrose 50% Injectable 25 Gram(s) IV Push once  dextrose 50% Injectable 25 Gram(s) IV Push once  insulin lispro (HumaLOG) corrective regimen sliding scale   SubCutaneous Before meals and at bedtime  latanoprost 0.005% Ophthalmic Solution 1 Drop(s) Both EYES at bedtime  lisinopril 40 milliGRAM(s) Oral daily  pantoprazole    Tablet 40 milliGRAM(s) Oral two times a day  sodium chloride 0.9%. 1000 milliLiter(s) (100 mL/Hr) IV Continuous <Continuous>  tamsulosin 0.4 milliGRAM(s) Oral at bedtime    MEDICATIONS  (PRN):  acetaminophen   Tablet .. 650 milliGRAM(s) Oral every 6 hours PRN Mild Pain (1 - 3)  ALPRAZolam 0.5 milliGRAM(s) Oral three times a day PRN agitation/anxiety  ondansetron Injectable 4 milliGRAM(s) IV Push every 6 hours PRN Nausea and/or Vomiting  sodium chloride 0.9% lock flush 10 milliLiter(s) IV Push every 1 hour PRN Pre/post blood products, medications, blood draw, and to maintain line patency  traZODone 100 milliGRAM(s) Oral at bedtime PRN sleep/agitation    T(F): 99.2 (08-02-19 @ 15:42), Max: 99.2 (08-02-19 @ 15:42)  HR: 115 (08-02-19 @ 16:30)  BP: 166/75 (08-02-19 @ 15:42)  RR: 15 (08-02-19 @ 15:42)  SpO2: 94% (08-02-19 @ 15:42)  Wt(kg): --    PHYSICAL EXAM:  General: alert, no acute distress  Eyes:  anicteric, no conjunctival injection, no discharge  Oropharynx: no lesions or injection 	  Neck: supple, without adenopathy  Lungs: clear to auscultation  Heart: regular rate and rhythm; no murmur, rubs or gallops  Abdomen: soft, nondistended, nontender, without mass or organomegaly  Skin: no lesions  Extremities: no clubbing, cyanosis, or edema  Neurologic: alert, oriented, moves all extremities, baseline dementia   redmond    LAB RESULTS:                        10.8   10.22 )-----------( 228      ( 02 Aug 2019 08:57 )             33.1     08-02    139  |  105  |  13  ----------------------------<  137<H>  3.8   |  23  |  1.14    Ca    8.3<L>      02 Aug 2019 08:57  Mg     1.8     08-01          MICROBIOLOGY:  RECENT CULTURES:      RADIOLOGY REVIEWED:    < from: US Duplex Venous Lower Ext Complete, Bilateral (08.02.19 @ 13:58) >  IMPRESSION:     No evidence of deep venous thrombosis in either lower extremity.    < end of copied text >

## 2019-08-02 NOTE — PROGRESS NOTE ADULT - ASSESSMENT
79 yo M with h/o dementia, here with agitation, functional decline. Recently extubated after upperGIB-s/p EGD with gastric clipping. Course c/b aspiration PNA, in which patient completed abx.  Also with urinary retention.  ID, GI and palliative following    #Aspiration PNA:  resolved-completed abx  CXR with evolution R infiltrate  ID following  Afebrile x 48 hrs  Leukocytosis improving-Sputum normal nghia only  Alpha Strep in single Bld Cx likely a contaminant  Appreciate S+S eval    #Leukocytosis:  resolved  see above    #Upper GI Bleed:  sp EGD with clipped ulcer  Hgb stable  transitioned to PO protonix BID  GI signed off--will follow up with Dr Thomas in 2 weeks    #Hypokalemia:  repleted and normal today    #Urinary Retention:  retaining > 700 cc urine yesterday  Marrero replaced  Appreciate  consult-Dr Gates  Started flomax    #OLVIN:  improved  avoid nephrotoxic meds  Monitor UOP    #HTN:  Cont norvasc and lisinopril    #L ankle pain:  X ray neg for fx but soft tissue swelling  ? trauma  Uric acid nml    DVT ppx: Venodynes (holding SQH in setting of GIB)  Dispo: Wife wants pt to go to acute rehab. Awaiting PM+R consult

## 2019-08-03 DIAGNOSIS — R33.9 RETENTION OF URINE, UNSPECIFIED: ICD-10-CM

## 2019-08-03 LAB
ANION GAP SERPL CALC-SCNC: 10 MMOL/L — SIGNIFICANT CHANGE UP (ref 5–17)
BUN SERPL-MCNC: 12 MG/DL — SIGNIFICANT CHANGE UP (ref 7–23)
CALCIUM SERPL-MCNC: 8.7 MG/DL — SIGNIFICANT CHANGE UP (ref 8.4–10.5)
CHLORIDE SERPL-SCNC: 106 MMOL/L — SIGNIFICANT CHANGE UP (ref 96–108)
CO2 SERPL-SCNC: 25 MMOL/L — SIGNIFICANT CHANGE UP (ref 22–31)
CREAT SERPL-MCNC: 1.18 MG/DL — SIGNIFICANT CHANGE UP (ref 0.5–1.3)
GLUCOSE BLDC GLUCOMTR-MCNC: 124 MG/DL — HIGH (ref 70–99)
GLUCOSE BLDC GLUCOMTR-MCNC: 135 MG/DL — HIGH (ref 70–99)
GLUCOSE BLDC GLUCOMTR-MCNC: 139 MG/DL — HIGH (ref 70–99)
GLUCOSE BLDC GLUCOMTR-MCNC: 157 MG/DL — HIGH (ref 70–99)
GLUCOSE SERPL-MCNC: 128 MG/DL — HIGH (ref 70–99)
HCT VFR BLD CALC: 34.9 % — LOW (ref 39–50)
HGB BLD-MCNC: 11.3 G/DL — LOW (ref 13–17)
MCHC RBC-ENTMCNC: 29.4 PG — SIGNIFICANT CHANGE UP (ref 27–34)
MCHC RBC-ENTMCNC: 32.4 GM/DL — SIGNIFICANT CHANGE UP (ref 32–36)
MCV RBC AUTO: 90.6 FL — SIGNIFICANT CHANGE UP (ref 80–100)
NRBC # BLD: 0 /100 WBCS — SIGNIFICANT CHANGE UP (ref 0–0)
PLATELET # BLD AUTO: 303 K/UL — SIGNIFICANT CHANGE UP (ref 150–400)
POTASSIUM SERPL-MCNC: 3.9 MMOL/L — SIGNIFICANT CHANGE UP (ref 3.5–5.3)
POTASSIUM SERPL-SCNC: 3.9 MMOL/L — SIGNIFICANT CHANGE UP (ref 3.5–5.3)
RBC # BLD: 3.85 M/UL — LOW (ref 4.2–5.8)
RBC # FLD: 12.9 % — SIGNIFICANT CHANGE UP (ref 10.3–14.5)
SODIUM SERPL-SCNC: 141 MMOL/L — SIGNIFICANT CHANGE UP (ref 135–145)
WBC # BLD: 12.95 K/UL — HIGH (ref 3.8–10.5)
WBC # FLD AUTO: 12.95 K/UL — HIGH (ref 3.8–10.5)

## 2019-08-03 PROCEDURE — 99233 SBSQ HOSP IP/OBS HIGH 50: CPT

## 2019-08-03 RX ORDER — TAMSULOSIN HYDROCHLORIDE 0.4 MG/1
0.8 CAPSULE ORAL AT BEDTIME
Refills: 0 | Status: DISCONTINUED | OUTPATIENT
Start: 2019-08-03 | End: 2019-08-09

## 2019-08-03 RX ADMIN — Medication 650 MILLIGRAM(S): at 00:22

## 2019-08-03 RX ADMIN — PANTOPRAZOLE SODIUM 40 MILLIGRAM(S): 20 TABLET, DELAYED RELEASE ORAL at 17:20

## 2019-08-03 RX ADMIN — LISINOPRIL 40 MILLIGRAM(S): 2.5 TABLET ORAL at 06:06

## 2019-08-03 RX ADMIN — Medication 650 MILLIGRAM(S): at 06:16

## 2019-08-03 RX ADMIN — AMLODIPINE BESYLATE 5 MILLIGRAM(S): 2.5 TABLET ORAL at 06:06

## 2019-08-03 RX ADMIN — Medication 650 MILLIGRAM(S): at 20:24

## 2019-08-03 RX ADMIN — Medication 650 MILLIGRAM(S): at 15:38

## 2019-08-03 RX ADMIN — PANTOPRAZOLE SODIUM 40 MILLIGRAM(S): 20 TABLET, DELAYED RELEASE ORAL at 06:06

## 2019-08-03 RX ADMIN — Medication 650 MILLIGRAM(S): at 14:26

## 2019-08-03 RX ADMIN — Medication 0.5 MILLIGRAM(S): at 20:21

## 2019-08-03 RX ADMIN — Medication 650 MILLIGRAM(S): at 07:18

## 2019-08-03 NOTE — PROGRESS NOTE ADULT - ASSESSMENT
77 yo M with h/o dementia, here with agitation, functional decline. Recently extubated after upperGIB-s/p EGD with gastric clipping. Course c/b aspiration PNA, in which patient completed abx.  Also with urinary retention.  ID, GI and palliative following    #Aspiration PNA:  resolving- ABx was completed   recent CXR with R infiltrate with improvement.  ID consult continue to follow  Afebrile currently 100F rectally,   tylenol for fever.   Blood cultures show gram positive Cocci Streptococcus SP growth x 1 bottle: likely contaminant as per ID.  Appreciate S+S- continue with puree diet.    #Leukocytosis:  elevated now (11.13-12.95)  continue to monitor- trend CBC and BMP    #Upper GI Bleed:  Hgb stable  C/W PO protonix BID  GI signed off--will follow up with Dr Thomas in 2 weeks    #Urinary Retention:  maintain redmond placement  Appreciate  consult-Dr Gates  c/w flomax    #OLVIN:  improved (Cr 1.18)  avoid nephrotoxic meds  C/W Monitor    #HTN:  Cont norvasc and lisinopril    #L ankle pain:  X ray neg for fx but soft tissue swelling  Uric acid nml    DVT ppx: Venodynes (holding SQH in setting of GIB)  Dispo: Wife wants pt to go to acute rehab. Awaiting PM+R consult

## 2019-08-03 NOTE — PROGRESS NOTE ADULT - ASSESSMENT
77 yo M, dementia, here with agitation, functional decline  Distention, coffee ground emesis, s/p EGD- clipped gastric ulcer  Remains extubated, alert, no distress  low grade fever x a few days  Minimal leukocytosis  Sputum normal nghia only  Alpha Strep in single Bld Cx likely a contaminant  CXR now with evolution of R infiltrate, confirming clinical suspicion of aspiration pneumonia  He is comfortable on RA  Marrero placed, failed a trial of void  wife reports he has been told of a ? urethral stricture, it has never been corrected although she had seen a urologist last year who advised some intervention.  He is tachycardic, not clear why  CXR shows some improvement in pneumonia  Plan:  monitor off antibiotics  Follow temps and CBC/diff   low threshold to restart CTX, perhaps he needed a longer course for RUL pneumonia  ID issues reviewed with wife  ID issues reviewed with wife

## 2019-08-03 NOTE — CONSULT NOTE ADULT - PROBLEM SELECTOR RECOMMENDATION 9
keep redmond, flomax 0.8mg, mobilize, bowel care, avoid anticholinergics, delayed void trial(after rehab). consider delayed surgery if fails void trial

## 2019-08-03 NOTE — CONSULT NOTE ADULT - SUBJECTIVE AND OBJECTIVE BOX
Patient is a 78y old  Male who presents with a chief complaint of Worsening agitation and difficulty ambulating (03 Aug 2019 12:36)      HPI:  77 yo M with PMHx of Alzheimer Dementia, gout, HTN brought in by wife from home for declining mental status and difficulty ambulating x 1 week. History obtained from pt's wife at bedside. Pt has known dementia but over the past week, his wife has noticed that he had difficulty ambulating with swelling of both feet, decreased PO intake and worsening agitation.Pt was seen by his PCP, Dr. Adame who started treatment for gout and sent Rx for steroids but his wife has not picked up the prescription.  He has been taking Alprazolam for many years, but recently also prescribed Zolpidem at bedtime as needed for increased agitation. His wife also notices decrease in urine output, denies recent illness, falls, LOC, fevers, chills, nausea, vomiting, diarrhea (2019 16:55)    6+ months ago found to have recurrent BPH and bladder neck contracture. advised to have surgery but declined and did not f/u. now with recurrent retention on flomax 0.4mg      PAST MEDICAL & SURGICAL HISTORY:  Gout  Hypertension  Dementia  H/O inguinal hernia repair      REVIEW OF SYSTEMS:    CONSTITUTIONAL:  no fever or chills  HEENT: No visual changes  ENDO: No sweating  NECK: No pain or stiffness  MUSCULOSKELETAL: No back pain, yes foot pain  RESPIRATORY: No shortness of breath  CARDIOVASCULAR: No chest pain  GASTROINTESTINAL: No abdominal or epigastric pain. No nausea, vomiting,  No diarrhea or constipation.   NEUROLOGICAL: No mental status changes  PSYCH: No depression, no mood changes  SKIN: No itching      MEDICATIONS  (STANDING):  amLODIPine   Tablet 5 milliGRAM(s) Oral daily  dextrose 5%. 1000 milliLiter(s) (50 mL/Hr) IV Continuous <Continuous>  dextrose 50% Injectable 12.5 Gram(s) IV Push once  dextrose 50% Injectable 25 Gram(s) IV Push once  dextrose 50% Injectable 25 Gram(s) IV Push once  insulin lispro (HumaLOG) corrective regimen sliding scale   SubCutaneous Before meals and at bedtime  latanoprost 0.005% Ophthalmic Solution 1 Drop(s) Both EYES at bedtime  lisinopril 40 milliGRAM(s) Oral daily  pantoprazole    Tablet 40 milliGRAM(s) Oral two times a day  sodium chloride 0.9%. 1000 milliLiter(s) (100 mL/Hr) IV Continuous <Continuous>  tamsulosin 0.8 milliGRAM(s) Oral at bedtime    MEDICATIONS  (PRN):  acetaminophen   Tablet .. 650 milliGRAM(s) Oral every 6 hours PRN Mild Pain (1 - 3)  acetaminophen   Tablet .. 650 milliGRAM(s) Oral every 6 hours PRN Temp greater or equal to 38C (100.4F)  ALPRAZolam 0.5 milliGRAM(s) Oral three times a day PRN agitation/anxiety  ondansetron Injectable 4 milliGRAM(s) IV Push every 6 hours PRN Nausea and/or Vomiting  sodium chloride 0.9% lock flush 10 milliLiter(s) IV Push every 1 hour PRN Pre/post blood products, medications, blood draw, and to maintain line patency  traZODone 100 milliGRAM(s) Oral at bedtime PRN sleep/agitation      Allergies    No Known Allergies    Intolerances        SOCIAL HISTORY: No illicit drug use    FAMILY HISTORY:  Family history of dementia: Mother  FHx: diabetes mellitus        T(C): 36.8 (19 @ 11:16), Max: 38 (19 @ 18:00)  T(F): 98.3 (19 @ 11:16), Max: 100.4 (19 @ 18:00)  HR: 114 (19 @ 11:16) (92 - 115)  BP: 156/81 (19 @ 11:16) (149/77 - 179/91)  RR: 14 (19 @ 11:16) (14 - 15)  SpO2: 95% (19 @ 11:16) (93% - 96%)      PHYSICAL EXAM:    Constitutional: alert, no acute distress  Back: No CVA tenderness  Respiratory: No accessory respiratory muscle use  Abd: Soft, NT/ND  no organomegaly  no hernia  : no bladder distention  Extremities: no edema  Neurological: A/O x 3  Psychiatric: Normal mood, normal affect  Skin: No rashes      19 @ 07:01  -  19 @ 07:00  --------------------------------------------------------  IN:  Total IN: 0 mL    OUT:    Indwelling Catheter - Urethral: 2300 mL  Total OUT: 2300 mL    Total NET: -2300 mL      19 @ 07:01  -  19 @ 07:00  --------------------------------------------------------  IN:  Total IN: 0 mL    OUT:    Indwelling Catheter - Urethral: 1200 mL  Total OUT: 1200 mL    Total NET: -1200 mL      19 @ 07:01  -  19 @ 07:00  --------------------------------------------------------  IN:  Total IN: 0 mL    OUT:    Indwelling Catheter - Urethral: 1500 mL  Total OUT: 1500 mL    Total NET: -1500 mL        19 @ 07:01  -  19 @ 07:00  --------------------------------------------------------  IN: 2720 mL / OUT: 1500 mL / NET: 1220 mL    19 @ 07:01  -  19 @ 14:32  --------------------------------------------------------  IN: 1080 mL / OUT: 0 mL / NET: 1080 mL          Weight (kg): 96.3 (19 @ 05:00)    LABS:                        11.3   12.95 )-----------( 303      ( 03 Aug 2019 06:25 )             34.9         Urinalysis Basic - ( 02 Aug 2019 18:31 )    Color: Yellow / Appearance: Clear / S.015 / pH: x  Gluc: x / Ketone: Negative  / Bili: Negative / Urobili: Negative   Blood: x / Protein: 100 / Nitrite: Negative   Leuk Esterase: Moderate / RBC: >50 /HPF / WBC 11-25 /HPF   Sq Epi: x / Non Sq Epi: Neg.-Few / Bacteria: Few /HPF            RADIOLOGY & ADDITIONAL STUDIES:

## 2019-08-03 NOTE — PROGRESS NOTE ADULT - SUBJECTIVE AND OBJECTIVE BOX
CC: f/u for pneumonia and fever    Patient reports: he is with underlying dementia, had a low grade fever last night, still with a mild leukocytosis.  He is being followed by speech and swallowing, is on a thickened diet.still tachycardic    REVIEW OF SYSTEMS:  All other review of systems negative (Comprehensive ROS)    Antimicrobials Day #  :    Other Medications Reviewed    T(F): 98.3 (19 @ 11:16), Max: 100.4 (19 @ 18:00)  HR: 114 (19 @ 11:16)  BP: 156/81 (19 @ 11:16)  RR: 14 (19 @ 11:16)  SpO2: 95% (19 @ 11:16)  Wt(kg): --    PHYSICAL EXAM:  General: alert, no acute distress, sleepy as per wife although was awake earlier  Eyes:  anicteric, no conjunctival injection, no discharge  Oropharynx: no lesions or injection 	  Neck: supple, without adenopathy  Lungs: relatively clear  Heart: regular rate and rhythm; no murmur, rubs or gallops  Abdomen: soft, nondistended, nontender, without mass or organomegaly  Skin: no lesions  Extremities: no clubbing, cyanosis, or edema  Neurologic: awake, confused, baseline dementia   redmond    LAB RESULTS:                        11.3   12.95 )-----------( 303      ( 03 Aug 2019 06:25 )             34.9     08-    141  |  106  |  12  ----------------------------<  128<H>  3.9   |  25  |  1.18    Ca    8.7      03 Aug 2019 06:25        Urinalysis Basic - ( 02 Aug 2019 18:31 )    Color: Yellow / Appearance: Clear / S.015 / pH: x  Gluc: x / Ketone: Negative  / Bili: Negative / Urobili: Negative   Blood: x / Protein: 100 / Nitrite: Negative   Leuk Esterase: Moderate / RBC: >50 /HPF / WBC 11-25 /HPF   Sq Epi: x / Non Sq Epi: Neg.-Few / Bacteria: Few /HPF      MICROBIOLOGY:  RECENT CULTURES:  repeat blood cultures sent     RADIOLOGY REVIEWED:  < from: Xray Chest 1 View-PORTABLE IMMEDIATE (19 @ 19:13) >  INTERPRETATION:  Single view chest    History aspiration    comparison 19    Right upper lung opacity has partially improved with mild residual. There   is a persistently poor inspiratory result without new consolidation,   pneumothorax or layering effusion.    < end of copied text >

## 2019-08-04 LAB
ANION GAP SERPL CALC-SCNC: 10 MMOL/L — SIGNIFICANT CHANGE UP (ref 5–17)
BUN SERPL-MCNC: 12 MG/DL — SIGNIFICANT CHANGE UP (ref 7–23)
CALCIUM SERPL-MCNC: 8.7 MG/DL — SIGNIFICANT CHANGE UP (ref 8.4–10.5)
CHLORIDE SERPL-SCNC: 105 MMOL/L — SIGNIFICANT CHANGE UP (ref 96–108)
CO2 SERPL-SCNC: 25 MMOL/L — SIGNIFICANT CHANGE UP (ref 22–31)
CREAT SERPL-MCNC: 1.15 MG/DL — SIGNIFICANT CHANGE UP (ref 0.5–1.3)
GLUCOSE BLDC GLUCOMTR-MCNC: 113 MG/DL — HIGH (ref 70–99)
GLUCOSE BLDC GLUCOMTR-MCNC: 127 MG/DL — HIGH (ref 70–99)
GLUCOSE BLDC GLUCOMTR-MCNC: 157 MG/DL — HIGH (ref 70–99)
GLUCOSE BLDC GLUCOMTR-MCNC: 158 MG/DL — HIGH (ref 70–99)
GLUCOSE SERPL-MCNC: 125 MG/DL — HIGH (ref 70–99)
HCT VFR BLD CALC: 33.6 % — LOW (ref 39–50)
HGB BLD-MCNC: 10.9 G/DL — LOW (ref 13–17)
MCHC RBC-ENTMCNC: 28.5 PG — SIGNIFICANT CHANGE UP (ref 27–34)
MCHC RBC-ENTMCNC: 32.4 GM/DL — SIGNIFICANT CHANGE UP (ref 32–36)
MCV RBC AUTO: 87.7 FL — SIGNIFICANT CHANGE UP (ref 80–100)
NRBC # BLD: 0 /100 WBCS — SIGNIFICANT CHANGE UP (ref 0–0)
PLATELET # BLD AUTO: 324 K/UL — SIGNIFICANT CHANGE UP (ref 150–400)
POTASSIUM SERPL-MCNC: 3.6 MMOL/L — SIGNIFICANT CHANGE UP (ref 3.5–5.3)
POTASSIUM SERPL-SCNC: 3.6 MMOL/L — SIGNIFICANT CHANGE UP (ref 3.5–5.3)
RBC # BLD: 3.83 M/UL — LOW (ref 4.2–5.8)
RBC # FLD: 12.9 % — SIGNIFICANT CHANGE UP (ref 10.3–14.5)
SODIUM SERPL-SCNC: 140 MMOL/L — SIGNIFICANT CHANGE UP (ref 135–145)
WBC # BLD: 11.74 K/UL — HIGH (ref 3.8–10.5)
WBC # FLD AUTO: 11.74 K/UL — HIGH (ref 3.8–10.5)

## 2019-08-04 PROCEDURE — 99233 SBSQ HOSP IP/OBS HIGH 50: CPT

## 2019-08-04 PROCEDURE — 93010 ELECTROCARDIOGRAM REPORT: CPT

## 2019-08-04 RX ORDER — CEFTRIAXONE 500 MG/1
1000 INJECTION, POWDER, FOR SOLUTION INTRAMUSCULAR; INTRAVENOUS EVERY 24 HOURS
Refills: 0 | Status: COMPLETED | OUTPATIENT
Start: 2019-08-05 | End: 2019-08-07

## 2019-08-04 RX ORDER — CEFTRIAXONE 500 MG/1
1000 INJECTION, POWDER, FOR SOLUTION INTRAMUSCULAR; INTRAVENOUS ONCE
Refills: 0 | Status: COMPLETED | OUTPATIENT
Start: 2019-08-04 | End: 2019-08-04

## 2019-08-04 RX ORDER — CEFTRIAXONE 500 MG/1
INJECTION, POWDER, FOR SOLUTION INTRAMUSCULAR; INTRAVENOUS
Refills: 0 | Status: COMPLETED | OUTPATIENT
Start: 2019-08-04 | End: 2019-08-08

## 2019-08-04 RX ORDER — POLYETHYLENE GLYCOL 3350 17 G/17G
17 POWDER, FOR SOLUTION ORAL DAILY
Refills: 0 | Status: DISCONTINUED | OUTPATIENT
Start: 2019-08-04 | End: 2019-08-09

## 2019-08-04 RX ADMIN — Medication 650 MILLIGRAM(S): at 23:00

## 2019-08-04 RX ADMIN — LISINOPRIL 40 MILLIGRAM(S): 2.5 TABLET ORAL at 05:39

## 2019-08-04 RX ADMIN — SODIUM CHLORIDE 100 MILLILITER(S): 9 INJECTION INTRAMUSCULAR; INTRAVENOUS; SUBCUTANEOUS at 05:40

## 2019-08-04 RX ADMIN — POLYETHYLENE GLYCOL 3350 17 GRAM(S): 17 POWDER, FOR SOLUTION ORAL at 11:21

## 2019-08-04 RX ADMIN — PANTOPRAZOLE SODIUM 40 MILLIGRAM(S): 20 TABLET, DELAYED RELEASE ORAL at 18:01

## 2019-08-04 RX ADMIN — Medication 650 MILLIGRAM(S): at 11:21

## 2019-08-04 RX ADMIN — SODIUM CHLORIDE 100 MILLILITER(S): 9 INJECTION INTRAMUSCULAR; INTRAVENOUS; SUBCUTANEOUS at 18:01

## 2019-08-04 RX ADMIN — AMLODIPINE BESYLATE 5 MILLIGRAM(S): 2.5 TABLET ORAL at 05:39

## 2019-08-04 RX ADMIN — CEFTRIAXONE 100 MILLIGRAM(S): 500 INJECTION, POWDER, FOR SOLUTION INTRAMUSCULAR; INTRAVENOUS at 17:59

## 2019-08-04 RX ADMIN — SODIUM CHLORIDE 100 MILLILITER(S): 9 INJECTION INTRAMUSCULAR; INTRAVENOUS; SUBCUTANEOUS at 22:53

## 2019-08-04 RX ADMIN — LATANOPROST 1 DROP(S): 0.05 SOLUTION/ DROPS OPHTHALMIC; TOPICAL at 01:23

## 2019-08-04 RX ADMIN — PANTOPRAZOLE SODIUM 40 MILLIGRAM(S): 20 TABLET, DELAYED RELEASE ORAL at 05:39

## 2019-08-04 RX ADMIN — Medication 650 MILLIGRAM(S): at 09:21

## 2019-08-04 RX ADMIN — Medication 650 MILLIGRAM(S): at 23:04

## 2019-08-04 RX ADMIN — LATANOPROST 1 DROP(S): 0.05 SOLUTION/ DROPS OPHTHALMIC; TOPICAL at 22:52

## 2019-08-04 RX ADMIN — TAMSULOSIN HYDROCHLORIDE 0.8 MILLIGRAM(S): 0.4 CAPSULE ORAL at 01:21

## 2019-08-04 RX ADMIN — TAMSULOSIN HYDROCHLORIDE 0.8 MILLIGRAM(S): 0.4 CAPSULE ORAL at 22:52

## 2019-08-04 NOTE — CHART NOTE - NSCHARTNOTEFT_GEN_A_CORE
Patient noted to be tachycardic in 120s this evening with fever 100.8 F. Upon my exam, HR noted to be regular at 104 bpm and fever of 98.7F s/p tylenol.  Other vitals stable. EKG with sinus tachy at 113 bpm.     restart ceftriaxone 1gram IV x 3 days, appreciate ID.  Source unclear at this time with noted CXR in 8/2 showing improving lung field opacities.   CBC/BMP/lactate ordered in am. blood cx pending from 8/2 collection and no growth at this time.     Also need to be concerned for PE given tachycardia, although tachycardia most likely due to fever. LE duplex neg for DVT.  Consider D dimer if tachycardia persists.

## 2019-08-04 NOTE — PROGRESS NOTE ADULT - ASSESSMENT
79 yo M with h/o dementia, here with agitation, functional decline. Recently extubated after upperGIB-s/p EGD with gastric clipping. Course c/b aspiration PNA, in which patient completed abx.  Also with urinary retention.  ID, URO, GI, and palliative following. Blood cultures continue with no growth.    #Aspiration PNA:   resolving- ABx was completed   recent CXR with R infiltrate with improvement.  ID consult continue to follow  Afebrile currently 100F rectally, spike fever last night 8pm (100.8)  tylenol for fever.   Blood cultures: gram positive Cocci Streptococcus SP growth x 1 bottle: likely contaminant as per ID.  Appreciate S+S: continue with puree diet.    #Leukocytosis:  down trend (11.74-12.95)  continue to monitor- trend CBC and BMP    #Upper GI Bleed:  Hgb stable  C/W PO protonix BID  GI signed off--will follow up with Dr Thomas in 2 weeks    #Urinary Retention:  maintain redmond placement  Appreciate  consult-Dr Gates as per below:  c/w flomax 0.8mg, avoid any anticholenergics, delayed voiding trials until after rehab.    #OLVIN:  stable (Cr 1.15 -1.18)  avoid nephrotoxic meds      #HTN:  Cont norvasc and lisinopril    #L ankle pain:  X ray neg for fx but soft tissue swelling  Uric acid nml    DVT ppx: Venodynes (holding SQH in setting of GIB)  Dispo: Wife wants pt to go to acute rehab. Awaiting PM+R consult 79 yo M with h/o dementia, here with agitation, functional decline. Recently extubated after upperGIB-s/p EGD with gastric clipping. Course c/b aspiration PNA, in which patient completed abx.  Also with urinary retention.  ID, URO, GI, and palliative following. Blood cultures continue with no growth.    Aspiration PNA:   resolving- ABx was completed   recent CXR with R infiltrate with improvement.  ID consult continue to follow  Afebrile currently 100F rectally, spike fever last night 8pm (100.8)  tylenol for fever.   Blood cultures: gram positive Cocci Streptococcus SP growth x 1 bottle: likely contaminant as per ID.  Appreciate S+S: continue with puree diet.    Leukocytosis:  down trend (11.74-12.95)  continue to monitor- trend CBC and BMP    Upper GI Bleed:  Hgb stable  C/W PO protonix BID  GI signed off--will follow up with Dr Thomas in 2 weeks    Urinary Retention:  maintain redmond placement  Appreciate  consult-Dr Gates as per below:  c/w flomax 0.8mg, avoid any anticholenergics, delayed voiding trials until after rehab.    OLVIN:  stable (Cr 1.15 -1.18)  avoid nephrotoxic meds    HTN:  Cont norvasc and lisinopril    L ankle pain:  X ray neg for fx but soft tissue swelling  Uric acid nml    DVT ppx: Venodynes (holding SQH in setting of GIB)  Dispo: Wife wants pt to go to acute rehab. Awaiting PM+R consult

## 2019-08-04 NOTE — PROGRESS NOTE ADULT - ASSESSMENT
79 yo M, dementia, here with agitation, functional decline  Distention, coffee ground emesis, s/p EGD- clipped gastric ulcer  Remains extubated, alert, no distress  low grade fever x a few days  Minimal leukocytosis  Sputum normal nghia only  Alpha Strep in single Bld Cx likely a contaminant  CXR now with evolution of R infiltrate, confirming clinical suspicion of aspiration pneumonia  He is comfortable on RA  Marrero placed, failed a trial of void  wife reports he has been told of a ? urethral stricture, it has never been corrected although she had seen a urologist last year who advised some intervention.  Dr Jaramillo note appreciated, BPH with bladder neck contracture noted 6 months ago, patient did bot follow up/  He is tachycardic, not clear why  CXR shows some improvement in pneumonia  Low grade temps of unclear etiology  Plan:  monitor off antibiotics  Follow temps and CBC/diff   low threshold to restart CTX, perhaps he needed a longer course for RUL pneumonia  He appears stable off antibiotics

## 2019-08-04 NOTE — PROGRESS NOTE ADULT - SUBJECTIVE AND OBJECTIVE BOX
Patient is a 78y old  Male who presents with a chief complaint of Worsening agitation and difficulty ambulating (04 Aug 2019 11:58)      Interval event: uneventful last night.  Patient seen and examined at bedside.    ROS: Denies any chest pain, Shortness of breath, nausea, vomiting.     ALLERGIES:  No Known Allergies    MEDICATIONS  (STANDING):  amLODIPine   Tablet 5 milliGRAM(s) Oral daily  dextrose 5%. 1000 milliLiter(s) (50 mL/Hr) IV Continuous <Continuous>  dextrose 50% Injectable 12.5 Gram(s) IV Push once  dextrose 50% Injectable 25 Gram(s) IV Push once  dextrose 50% Injectable 25 Gram(s) IV Push once  insulin lispro (HumaLOG) corrective regimen sliding scale   SubCutaneous Before meals and at bedtime  latanoprost 0.005% Ophthalmic Solution 1 Drop(s) Both EYES at bedtime  lisinopril 40 milliGRAM(s) Oral daily  pantoprazole    Tablet 40 milliGRAM(s) Oral two times a day  polyethylene glycol 3350 17 Gram(s) Oral daily  sodium chloride 0.9%. 1000 milliLiter(s) (100 mL/Hr) IV Continuous <Continuous>  tamsulosin 0.8 milliGRAM(s) Oral at bedtime    MEDICATIONS  (PRN):  acetaminophen   Tablet .. 650 milliGRAM(s) Oral every 6 hours PRN Mild Pain (1 - 3)  acetaminophen   Tablet .. 650 milliGRAM(s) Oral every 6 hours PRN Temp greater or equal to 38C (100.4F)  ondansetron Injectable 4 milliGRAM(s) IV Push every 6 hours PRN Nausea and/or Vomiting  sodium chloride 0.9% lock flush 10 milliLiter(s) IV Push every 1 hour PRN Pre/post blood products, medications, blood draw, and to maintain line patency    Vital Signs Last 24 Hrs  T(F): 99.9 (04 Aug 2019 05:00), Max: 100.8 (03 Aug 2019 20:02)  HR: 94 (04 Aug 2019 05:00) (94 - 102)  BP: 145/72 (04 Aug 2019 05:00) (145/72 - 160/81)  RR: 16 (04 Aug 2019 05:00) (15 - 16)  SpO2: 94% (04 Aug 2019 05:00) (94% - 96%)  I&O's Summary    03 Aug 2019 07:01  -  04 Aug 2019 07:00  --------------------------------------------------------  IN: 2320 mL / OUT: 3700 mL / NET: -1380 mL    04 Aug 2019 07:  -  04 Aug 2019 12:05  --------------------------------------------------------  IN: 320 mL / OUT: 0 mL / NET: 320 mL        PHYSICAL EXAM:  General: NAD, A/O x 1  ENT: MMM, no ear/nose discharge  Neck: Supple, No JVD  Lungs: Clear to auscultation bilaterally, no wheezing, rales, rhonchi  Cardio: RRR, S1/S2, No murmurs  Abdomen: Soft, Nontender,x 4 quads Nondistended; Bowel sounds present  Extremities: No calf tenderness, No edema, tenderness to LLE with no swelling    LABS:                        10.9   11.74 )-----------( 324      ( 04 Aug 2019 05:45 )             33.6         140  |  105  |  12  ----------------------------<  125  3.6   |  25  |  1.15    Ca    8.7      04 Aug 2019 05:45      eGFR if Non African American: 61 mL/min/1.73M2 (19 @ 05:45)  eGFR if : 71 mL/min/1.73M2 (19 @ 05:45)      Lactate, Blood: 1.0 mmol/L ( @ 19:45)    POCT Blood Glucose.: 158 mg/dL (04 Aug 2019 11:43)  POCT Blood Glucose.: 127 mg/dL (04 Aug 2019 07:48)  POCT Blood Glucose.: 157 mg/dL (03 Aug 2019 20:04)  POCT Blood Glucose.: 124 mg/dL (03 Aug 2019 16:40)     [id number] 5.7    Urinalysis Basic - ( 02 Aug 2019 18:31 )    Color: Yellow / Appearance: Clear / S.015 / pH: x  Gluc: x / Ketone: Negative  / Bili: Negative / Urobili: Negative   Blood: x / Protein: 100 / Nitrite: Negative   Leuk Esterase: Moderate / RBC: >50 /HPF / WBC 11-25 /HPF   Sq Epi: x / Non Sq Epi: Neg.-Few / Bacteria: Few /HPF    Culture - Blood (collected 02 Aug 2019 20:40)  Source: .Blood Blood-Peripheral  Preliminary Report (04 Aug 2019 02:01):    No growth to date.    Culture - Blood (collected 02 Aug 2019 20:30)  Source: .Blood Blood-Peripheral  Preliminary Report (04 Aug 2019 02:01):    No growth to date.    RADIOLOGY & ADDITIONAL TESTS:    < from: Xray Chest 1 View-PORTABLE IMMEDIATE (19 @ 19:13) >  EXAM:  XR CHEST PORTABLE IMMED 1V    PROCEDURE DATE:  2019    INTERPRETATION:  Single view chest  comparison 19  Right upper lung opacity has partially improved with mild residual. There   is a persistently poor inspiratory result without new consolidation,   pneumothorax or layering effusion.  < end of copied text >    < from: US Duplex Venous Lower Ext Complete, Bilateral (19 @ 13:58) >  EXAM:  US DPLX LWR EXT VEINS COMPL BI    PROCEDURE DATE:  2019    INTERPRETATION:  CLINICAL INFORMATION: Bilateral lower extreme pain and   swelling of unknown severity or duration. Evaluate for DVT bilaterally.   No additional information.  COMPARISON: 3/13/2013  TECHNIQUE: Duplex sonography of the BILATERAL LOWER extremity veins with   color and spectral Doppler, with and without compression.    IMPRESSION:   No evidence of deep venous thrombosis in either lower extremity.  < end of copied text >      < from: Xray Ankle Complete 3 Views, Left (19 @ 10:02) >  EXAM:  ANKLE LEFT (MINIMUM 3 VIEWS)    PROCEDURE DATE:  2019    INTERPRETATION:  Left ankle. Patient has local pain. 3 views obtained.  There is a small inferior calcaneal spur.  There is edema over the lateral malleolus and also anterior to the ankle   joint.  No bone destruction or fracture is evident.  IMPRESSION: Soft tissue swelling.  < end of copied text >

## 2019-08-04 NOTE — PROGRESS NOTE ADULT - SUBJECTIVE AND OBJECTIVE BOX
CC: f/u for pneumonia and fever    Patient reports: he is alert and attentive but has dementia, cannot follow commands    REVIEW OF SYSTEMS:  All other review of systems negative (Comprehensive ROS): not ambulatory, redmond for retention    Antimicrobials Day #  :off    Other Medications Reviewed    T(F): 99.9 (19 @ 05:00), Max: 100.8 (19 @ 20:02)  HR: 94 (19 @ 05:00)  BP: 145/72 (19 @ 05:00)  RR: 16 (19 @ 05:00)  SpO2: 94% (19 @ 05:00)  Wt(kg): --    PHYSICAL EXAM:  General: alert, no acute distress  Eyes:  anicteric, no conjunctival injection, no discharge  Oropharynx: no lesions or injection 	  Neck: supple, without adenopathy  Lungs: clear to auscultation, diminished at bases  Heart: regular rate and rhythm; no murmur, rubs or gallops  Abdomen: soft, nondistended, nontender, without mass or organomegaly  Skin: no lesions  Extremities: no clubbing, cyanosis, or edema  Neurologic: attentive, pleasantly demented, moves all extremities    LAB RESULTS:                        10.9   11.74 )-----------( 324      ( 04 Aug 2019 05:45 )             33.6         140  |  105  |  12  ----------------------------<  125<H>  3.6   |  25  |  1.15    Ca    8.7      04 Aug 2019 05:45        Urinalysis Basic - ( 02 Aug 2019 18:31 )    Color: Yellow / Appearance: Clear / S.015 / pH: x  Gluc: x / Ketone: Negative  / Bili: Negative / Urobili: Negative   Blood: x / Protein: 100 / Nitrite: Negative   Leuk Esterase: Moderate / RBC: >50 /HPF / WBC 11-25 /HPF   Sq Epi: x / Non Sq Epi: Neg.-Few / Bacteria: Few /HPF      MICROBIOLOGY:  RECENT CULTURES:   @ 20:40 .Blood Blood-Peripheral     No growth to date.       @ 20:30 .Blood Blood-Peripheral     No growth to date.          RADIOLOGY REVIEWED:  < from: Xray Chest 1 View-PORTABLE IMMEDIATE (19 @ 19:13) >  EXAM:  XR CHEST PORTABLE IMMED 1V      PROCEDURE DATE:  2019        INTERPRETATION:  Single view chest    History aspiration    comparison 19    Right upper lung opacity has partially improved with mild residual. There   is a persistently poor inspiratory result without new consolidation,   pneumothorax or layering effusion.    < end of copied text >

## 2019-08-05 LAB
ANION GAP SERPL CALC-SCNC: 12 MMOL/L — SIGNIFICANT CHANGE UP (ref 5–17)
BUN SERPL-MCNC: 13 MG/DL — SIGNIFICANT CHANGE UP (ref 7–23)
CALCIUM SERPL-MCNC: 8.1 MG/DL — LOW (ref 8.4–10.5)
CHLORIDE SERPL-SCNC: 107 MMOL/L — SIGNIFICANT CHANGE UP (ref 96–108)
CO2 SERPL-SCNC: 23 MMOL/L — SIGNIFICANT CHANGE UP (ref 22–31)
CREAT SERPL-MCNC: 1.14 MG/DL — SIGNIFICANT CHANGE UP (ref 0.5–1.3)
GLUCOSE BLDC GLUCOMTR-MCNC: 125 MG/DL — HIGH (ref 70–99)
GLUCOSE BLDC GLUCOMTR-MCNC: 129 MG/DL — HIGH (ref 70–99)
GLUCOSE BLDC GLUCOMTR-MCNC: 168 MG/DL — HIGH (ref 70–99)
GLUCOSE BLDC GLUCOMTR-MCNC: 173 MG/DL — HIGH (ref 70–99)
GLUCOSE SERPL-MCNC: 135 MG/DL — HIGH (ref 70–99)
HCT VFR BLD CALC: 29.4 % — LOW (ref 39–50)
HGB BLD-MCNC: 9.6 G/DL — LOW (ref 13–17)
LACTATE SERPL-SCNC: 0.5 MMOL/L — LOW (ref 0.7–2)
MCHC RBC-ENTMCNC: 29.1 PG — SIGNIFICANT CHANGE UP (ref 27–34)
MCHC RBC-ENTMCNC: 32.7 GM/DL — SIGNIFICANT CHANGE UP (ref 32–36)
MCV RBC AUTO: 89.1 FL — SIGNIFICANT CHANGE UP (ref 80–100)
NRBC # BLD: 0 /100 WBCS — SIGNIFICANT CHANGE UP (ref 0–0)
PLATELET # BLD AUTO: 319 K/UL — SIGNIFICANT CHANGE UP (ref 150–400)
POTASSIUM SERPL-MCNC: 3.5 MMOL/L — SIGNIFICANT CHANGE UP (ref 3.5–5.3)
POTASSIUM SERPL-SCNC: 3.5 MMOL/L — SIGNIFICANT CHANGE UP (ref 3.5–5.3)
RBC # BLD: 3.3 M/UL — LOW (ref 4.2–5.8)
RBC # FLD: 12.8 % — SIGNIFICANT CHANGE UP (ref 10.3–14.5)
SODIUM SERPL-SCNC: 142 MMOL/L — SIGNIFICANT CHANGE UP (ref 135–145)
WBC # BLD: 10.1 K/UL — SIGNIFICANT CHANGE UP (ref 3.8–10.5)
WBC # FLD AUTO: 10.1 K/UL — SIGNIFICANT CHANGE UP (ref 3.8–10.5)

## 2019-08-05 PROCEDURE — 99233 SBSQ HOSP IP/OBS HIGH 50: CPT

## 2019-08-05 RX ADMIN — LATANOPROST 1 DROP(S): 0.05 SOLUTION/ DROPS OPHTHALMIC; TOPICAL at 21:40

## 2019-08-05 RX ADMIN — POLYETHYLENE GLYCOL 3350 17 GRAM(S): 17 POWDER, FOR SOLUTION ORAL at 11:48

## 2019-08-05 RX ADMIN — PANTOPRAZOLE SODIUM 40 MILLIGRAM(S): 20 TABLET, DELAYED RELEASE ORAL at 07:17

## 2019-08-05 RX ADMIN — TAMSULOSIN HYDROCHLORIDE 0.8 MILLIGRAM(S): 0.4 CAPSULE ORAL at 21:40

## 2019-08-05 RX ADMIN — LISINOPRIL 40 MILLIGRAM(S): 2.5 TABLET ORAL at 07:17

## 2019-08-05 RX ADMIN — SODIUM CHLORIDE 100 MILLILITER(S): 9 INJECTION INTRAMUSCULAR; INTRAVENOUS; SUBCUTANEOUS at 07:18

## 2019-08-05 RX ADMIN — PANTOPRAZOLE SODIUM 40 MILLIGRAM(S): 20 TABLET, DELAYED RELEASE ORAL at 17:44

## 2019-08-05 RX ADMIN — SODIUM CHLORIDE 100 MILLILITER(S): 9 INJECTION INTRAMUSCULAR; INTRAVENOUS; SUBCUTANEOUS at 21:40

## 2019-08-05 RX ADMIN — SODIUM CHLORIDE 100 MILLILITER(S): 9 INJECTION INTRAMUSCULAR; INTRAVENOUS; SUBCUTANEOUS at 11:50

## 2019-08-05 RX ADMIN — AMLODIPINE BESYLATE 5 MILLIGRAM(S): 2.5 TABLET ORAL at 07:17

## 2019-08-05 RX ADMIN — CEFTRIAXONE 100 MILLIGRAM(S): 500 INJECTION, POWDER, FOR SOLUTION INTRAMUSCULAR; INTRAVENOUS at 17:43

## 2019-08-05 NOTE — PROGRESS NOTE ADULT - ASSESSMENT
A/P 77 yo M with PMHx of Alzheimer Dementia, gout, HTN brought in by wife from home for declining mental status and difficulty ambulating x 1 week. History obtained from pt's wife at bedside. Pt has known dementia and his wife has noticed that he had difficulty ambulating with swelling of both feet, decreased PO intake and worsening agitation.  Alzheimer's dementia with behavioral disturbance.  CT head negative for acute pathology, worsening agitation maybe  r/t progressive dementia.   s/p Acute respiratory failure w/ intubation  Due to  Aspiration Pneumonitis   Severe Sepsis due to Aspiration PNA and Streptococcus bacteremia, f/b ID    Urinary retention, seen by urology keep redmond for now, added  flomax, repeat voiding trial after rehab    Upper GIB with Acute blood loss anemia s/p EGD s/p Clips for visible vessel ulcer , f/b GI  Palliative: met w/ wife at bedside this AM, she reports pt is being pt being eval for LUPE.  Pt is more awake, alert today. Speaks mostly Guamanian  Asked w/ wife if she had any questions regarding the MOLST form we discussed,  she did not, and reported she needs " more time with this. "     Currently more concerned with pt going to rehab to regain his strength.  Will cont to follow w/ med team, monitor pts clinical status, and cont supportive care.

## 2019-08-05 NOTE — PROGRESS NOTE ADULT - ASSESSMENT
79 yo M, dementia, here with agitation, functional decline  Distention, coffee ground emesis, s/p EGD- clipped gastric ulcer  Remains extubated, alert, no distress  low grade fever x a few days  Minimal leukocytosis  Sputum normal nghia only  Alpha Strep in single Bld Cx likely a contaminant  CXR now with evolution of R infiltrate, confirming clinical suspicion of aspiration pneumonia  He is comfortable on RA  Marrero placed, failed a trial of void  wife reports he has been told of a ? urethral stricture, it has never been corrected although she had seen a urologist last year who advised some intervention.  Dr Jaramillo note appreciated, BPH with bladder neck contracture noted 6 months ago, patient did bot follow up/  He is tachycardic, not clear why  CXR shows some improvement in pneumonia  Low grade temps of unclear etiology, CTX restarted 8/4  Given left knee swelling and reports of ankle selling last week, ? if fevers could reflect crystal induced arthritis.his respiratory status seems improved  Plan:  CTX for now  Follow temps and CBC/diff   Await ortho input, ? if left knee should be aspirated for cell count, crystals, and culture.  I am skeptical that he has an infection in joint

## 2019-08-05 NOTE — PROGRESS NOTE ADULT - SUBJECTIVE AND OBJECTIVE BOX
INTERVAL HPI/OVERNIGHT EVENTS:  HPI:  77 y/o male s/p recent UGIB who is admitted to [facility]. Patient seen and examined at bedside. Wife present during exam. Patient seen earlier today.   No melena or bright red blood per rectum per staff.       MEDICATIONS  (STANDING):  amLODIPine   Tablet 5 milliGRAM(s) Oral daily  cefTRIAXone   IVPB 1000 milliGRAM(s) IV Intermittent every 24 hours  cefTRIAXone   IVPB      dextrose 5%. 1000 milliLiter(s) (50 mL/Hr) IV Continuous <Continuous>  dextrose 50% Injectable 12.5 Gram(s) IV Push once  dextrose 50% Injectable 25 Gram(s) IV Push once  dextrose 50% Injectable 25 Gram(s) IV Push once  insulin lispro (HumaLOG) corrective regimen sliding scale   SubCutaneous Before meals and at bedtime  latanoprost 0.005% Ophthalmic Solution 1 Drop(s) Both EYES at bedtime  lisinopril 40 milliGRAM(s) Oral daily  pantoprazole    Tablet 40 milliGRAM(s) Oral two times a day  polyethylene glycol 3350 17 Gram(s) Oral daily  sodium chloride 0.9%. 1000 milliLiter(s) (100 mL/Hr) IV Continuous <Continuous>  tamsulosin 0.8 milliGRAM(s) Oral at bedtime    MEDICATIONS  (PRN):  acetaminophen   Tablet .. 650 milliGRAM(s) Oral every 6 hours PRN Mild Pain (1 - 3)  acetaminophen   Tablet .. 650 milliGRAM(s) Oral every 6 hours PRN Temp greater or equal to 38C (100.4F)  ondansetron Injectable 4 milliGRAM(s) IV Push every 6 hours PRN Nausea and/or Vomiting  sodium chloride 0.9% lock flush 10 milliLiter(s) IV Push every 1 hour PRN Pre/post blood products, medications, blood draw, and to maintain line patency      Allergies    No Known Allergies    Intolerances        PHYSICAL EXAM:   Vital Signs:  Vital Signs Last 24 Hrs  T(C): 37.8 (05 Aug 2019 05:00), Max: 38.6 (04 Aug 2019 17:00)  T(F): 100.1 (05 Aug 2019 05:00), Max: 101.4 (04 Aug 2019 17:00)  HR: 102 (05 Aug 2019 05:00) (102 - 122)  BP: 140/70 (05 Aug 2019 05:00) (140/70 - 155/83)  BP(mean): --  RR: 15 (05 Aug 2019 05:00) (15 - 18)  SpO2: 96% (05 Aug 2019 05:00) (96% - 98%)  Daily     Daily I&O's Summary    04 Aug 2019 07:01  -  05 Aug 2019 07:00  --------------------------------------------------------  IN: 1540 mL / OUT: 3200 mL / NET: -1660 mL    05 Aug 2019 07:01  -  05 Aug 2019 15:49  --------------------------------------------------------  IN: 1180 mL / OUT: 800 mL / NET: 380 mL    Constitutional: NAD, well-developed  Neck: No LAD, supple  Respiratory: clear to auscultation b/l no rales, rhonchi, wheezing  Cardiovascular: S1 and S2, RRR, no murmur  Gastrointestinal: +BS x4, soft, NT/ND, neg HSM,  Extremities: No peripheral edema, neg clubbing, cyanosis  Vascular: 2+ peripheral pulses  Neurological: A/O x 3, no focal deficits  Psychiatric: Normal mood, normal affect  Skin: No rashes      LABS:                        9.6    10.10 )-----------( 319      ( 05 Aug 2019 06:05 )             29.4     08-05    142  |  107  |  13  ----------------------------<  135<H>  3.5   |  23  |  1.14    Ca    8.1<L>      05 Aug 2019 06:05          amylase   lipase  RADIOLOGY & ADDITIONAL TESTS:

## 2019-08-05 NOTE — PROGRESS NOTE ADULT - SUBJECTIVE AND OBJECTIVE BOX
Patient is a 78y old  Male who presents with a chief complaint of Worsening agitation and difficulty ambulating (05 Aug 2019 12:07)  Patient seen and examined at bedside.  Per wife at bedside pt still with pain at left knee.  Pt poor historian.    ALLERGIES:  No Known Allergies    MEDICATIONS  (STANDING):  amLODIPine   Tablet 5 milliGRAM(s) Oral daily  cefTRIAXone   IVPB 1000 milliGRAM(s) IV Intermittent every 24 hours  cefTRIAXone   IVPB      dextrose 5%. 1000 milliLiter(s) (50 mL/Hr) IV Continuous <Continuous>  dextrose 50% Injectable 12.5 Gram(s) IV Push once  dextrose 50% Injectable 25 Gram(s) IV Push once  dextrose 50% Injectable 25 Gram(s) IV Push once  insulin lispro (HumaLOG) corrective regimen sliding scale   SubCutaneous Before meals and at bedtime  latanoprost 0.005% Ophthalmic Solution 1 Drop(s) Both EYES at bedtime  lisinopril 40 milliGRAM(s) Oral daily  pantoprazole    Tablet 40 milliGRAM(s) Oral two times a day  polyethylene glycol 3350 17 Gram(s) Oral daily  sodium chloride 0.9%. 1000 milliLiter(s) (100 mL/Hr) IV Continuous <Continuous>  tamsulosin 0.8 milliGRAM(s) Oral at bedtime    MEDICATIONS  (PRN):  acetaminophen   Tablet .. 650 milliGRAM(s) Oral every 6 hours PRN Mild Pain (1 - 3)  acetaminophen   Tablet .. 650 milliGRAM(s) Oral every 6 hours PRN Temp greater or equal to 38C (100.4F)  ondansetron Injectable 4 milliGRAM(s) IV Push every 6 hours PRN Nausea and/or Vomiting  sodium chloride 0.9% lock flush 10 milliLiter(s) IV Push every 1 hour PRN Pre/post blood products, medications, blood draw, and to maintain line patency    Vital Signs Last 24 Hrs  T(F): 100.1 (05 Aug 2019 05:00), Max: 101.4 (04 Aug 2019 17:00)  HR: 102 (05 Aug 2019 05:00) (102 - 122)  BP: 140/70 (05 Aug 2019 05:00) (140/70 - 155/83)  RR: 15 (05 Aug 2019 05:00) (15 - 18)  SpO2: 96% (05 Aug 2019 05:00) (96% - 98%)  I&O's Summary    04 Aug 2019 07:  -  05 Aug 2019 07:00  --------------------------------------------------------  IN: 1540 mL / OUT: 3200 mL / NET: -1660 mL    05 Aug 2019 07:01  -  05 Aug 2019 13:50  --------------------------------------------------------  IN: 1180 mL / OUT: 0 mL / NET: 1180 mL      PHYSICAL EXAM:  General: NAD, A/O x 3  ENT: MMM  Neck: Supple, No JVD  Lungs: Clear to auscultation bilaterally  Cardio: RRR, S1/S2, No murmurs  Abdomen: Soft, Nontender, Nondistended; Bowel sounds present  Extremities: No calf tenderness, No pitting edema    LABS:                        9.6    10.10 )-----------( 319      ( 05 Aug 2019 06:05 )             29.4         142  |  107  |  13  ----------------------------<  135  3.5   |  23  |  1.14    Ca    8.1      05 Aug 2019 06:05      eGFR if Non African American: 62 mL/min/1.73M2 (19 @ 06:05)  eGFR if African American: 72 mL/min/1.73M2 (19 @ 06:05)      Lactate, Blood: 0.5 mmol/L ( @ 06:05)  Lactate, Blood: 1.0 mmol/L ( @ 19:45)                      POCT Blood Glucose.: 173 mg/dL (05 Aug 2019 11:47)  POCT Blood Glucose.: 129 mg/dL (05 Aug 2019 07:43)  POCT Blood Glucose.: 113 mg/dL (04 Aug 2019 22:56)  POCT Blood Glucose.: 157 mg/dL (04 Aug 2019 16:40)    07-28 [id number] 5.7    Urinalysis Basic - ( 02 Aug 2019 18:31 )    Color: Yellow / Appearance: Clear / S.015 / pH: x  Gluc: x / Ketone: Negative  / Bili: Negative / Urobili: Negative   Blood: x / Protein: 100 / Nitrite: Negative   Leuk Esterase: Moderate / RBC: >50 /HPF / WBC 11-25 /HPF   Sq Epi: x / Non Sq Epi: Neg.-Few / Bacteria: Few /HPF        Culture - Blood (collected 02 Aug 2019 20:40)  Source: .Blood Blood-Peripheral  Preliminary Report (04 Aug 2019 02:01):    No growth to date.    Culture - Blood (collected 02 Aug 2019 20:30)  Source: .Blood Blood-Peripheral  Preliminary Report (04 Aug 2019 02:01):    No growth to date.        RADIOLOGY & ADDITIONAL TESTS:    Care Discussed with Consultants/Other Providers: Patient is a 78y old  Male who presents with a chief complaint of Worsening agitation and difficulty ambulating (05 Aug 2019 12:07)  Patient seen and examined at bedside.  Per wife at bedside pt still with pain at left knee.  Pt poor historian.    ALLERGIES:  No Known Allergies    MEDICATIONS  (STANDING):  amLODIPine   Tablet 5 milliGRAM(s) Oral daily  cefTRIAXone   IVPB 1000 milliGRAM(s) IV Intermittent every 24 hours  cefTRIAXone   IVPB      dextrose 5%. 1000 milliLiter(s) (50 mL/Hr) IV Continuous <Continuous>  dextrose 50% Injectable 12.5 Gram(s) IV Push once  dextrose 50% Injectable 25 Gram(s) IV Push once  dextrose 50% Injectable 25 Gram(s) IV Push once  insulin lispro (HumaLOG) corrective regimen sliding scale   SubCutaneous Before meals and at bedtime  latanoprost 0.005% Ophthalmic Solution 1 Drop(s) Both EYES at bedtime  lisinopril 40 milliGRAM(s) Oral daily  pantoprazole    Tablet 40 milliGRAM(s) Oral two times a day  polyethylene glycol 3350 17 Gram(s) Oral daily  sodium chloride 0.9%. 1000 milliLiter(s) (100 mL/Hr) IV Continuous <Continuous>  tamsulosin 0.8 milliGRAM(s) Oral at bedtime    MEDICATIONS  (PRN):  acetaminophen   Tablet .. 650 milliGRAM(s) Oral every 6 hours PRN Mild Pain (1 - 3)  acetaminophen   Tablet .. 650 milliGRAM(s) Oral every 6 hours PRN Temp greater or equal to 38C (100.4F)  ondansetron Injectable 4 milliGRAM(s) IV Push every 6 hours PRN Nausea and/or Vomiting  sodium chloride 0.9% lock flush 10 milliLiter(s) IV Push every 1 hour PRN Pre/post blood products, medications, blood draw, and to maintain line patency    Vital Signs Last 24 Hrs  T(F): 100.1 (05 Aug 2019 05:00), Max: 101.4 (04 Aug 2019 17:00)  HR: 102 (05 Aug 2019 05:00) (102 - 122)  BP: 140/70 (05 Aug 2019 05:00) (140/70 - 155/83)  RR: 15 (05 Aug 2019 05:00) (15 - 18)  SpO2: 96% (05 Aug 2019 05:00) (96% - 98%)  I&O's Summary    04 Aug 2019 07:01  -  05 Aug 2019 07:00  --------------------------------------------------------  IN: 1540 mL / OUT: 3200 mL / NET: -1660 mL    05 Aug 2019 07:01  -  05 Aug 2019 13:50  --------------------------------------------------------  IN: 1180 mL / OUT: 0 mL / NET: 1180 mL      PHYSICAL EXAM:  General: NAD, very confused.  ENT: MMM  Neck: Supple, No JVD  Lungs: Clear to auscultation bilaterally  Cardio: RRR, S1/S2, No murmurs  Abdomen: Soft, Nontender, Nondistended; Bowel sounds present  Extremities: No calf tenderness, left knee with edema, no erythema, no warmth, decreased ROM  Neuro:  unable to follow commands    LABS:                        9.6    10.10 )-----------( 319      ( 05 Aug 2019 06:05 )             29.4         142  |  107  |  13  ----------------------------<  135  3.5   |  23  |  1.14    Ca    8.1      05 Aug 2019 06:05      eGFR if Non African American: 62 mL/min/1.73M2 (19 @ 06:05)  eGFR if African American: 72 mL/min/1.73M2 (19 @ 06:05)      Lactate, Blood: 0.5 mmol/L ( @ 06:05)  Lactate, Blood: 1.0 mmol/L ( @ 19:45)                      POCT Blood Glucose.: 173 mg/dL (05 Aug 2019 11:47)  POCT Blood Glucose.: 129 mg/dL (05 Aug 2019 07:43)  POCT Blood Glucose.: 113 mg/dL (04 Aug 2019 22:56)  POCT Blood Glucose.: 157 mg/dL (04 Aug 2019 16:40)    07-28 [id number] 5.7    Urinalysis Basic - ( 02 Aug 2019 18:31 )    Color: Yellow / Appearance: Clear / S.015 / pH: x  Gluc: x / Ketone: Negative  / Bili: Negative / Urobili: Negative   Blood: x / Protein: 100 / Nitrite: Negative   Leuk Esterase: Moderate / RBC: >50 /HPF / WBC 11-25 /HPF   Sq Epi: x / Non Sq Epi: Neg.-Few / Bacteria: Few /HPF        Culture - Blood (collected 02 Aug 2019 20:40)  Source: .Blood Blood-Peripheral  Preliminary Report (04 Aug 2019 02:01):    No growth to date.    Culture - Blood (collected 02 Aug 2019 20:30)  Source: .Blood Blood-Peripheral  Preliminary Report (04 Aug 2019 02:01):    No growth to date.        RADIOLOGY & ADDITIONAL TESTS:    Care Discussed with Consultants/Other Providers: Patient is a 78y old  Male who presents with a chief complaint of Worsening agitation and difficulty ambulating (05 Aug 2019 12:07)  Patient seen and examined at bedside.  Per wife at bedside pt still with pain at left knee.  Pt poor historian.    ALLERGIES:  No Known Allergies    MEDICATIONS  (STANDING):  amLODIPine   Tablet 5 milliGRAM(s) Oral daily  cefTRIAXone   IVPB 1000 milliGRAM(s) IV Intermittent every 24 hours  cefTRIAXone   IVPB      dextrose 5%. 1000 milliLiter(s) (50 mL/Hr) IV Continuous <Continuous>  dextrose 50% Injectable 12.5 Gram(s) IV Push once  dextrose 50% Injectable 25 Gram(s) IV Push once  dextrose 50% Injectable 25 Gram(s) IV Push once  insulin lispro (HumaLOG) corrective regimen sliding scale   SubCutaneous Before meals and at bedtime  latanoprost 0.005% Ophthalmic Solution 1 Drop(s) Both EYES at bedtime  lisinopril 40 milliGRAM(s) Oral daily  pantoprazole    Tablet 40 milliGRAM(s) Oral two times a day  polyethylene glycol 3350 17 Gram(s) Oral daily  sodium chloride 0.9%. 1000 milliLiter(s) (100 mL/Hr) IV Continuous <Continuous>  tamsulosin 0.8 milliGRAM(s) Oral at bedtime    MEDICATIONS  (PRN):  acetaminophen   Tablet .. 650 milliGRAM(s) Oral every 6 hours PRN Mild Pain (1 - 3)  acetaminophen   Tablet .. 650 milliGRAM(s) Oral every 6 hours PRN Temp greater or equal to 38C (100.4F)  ondansetron Injectable 4 milliGRAM(s) IV Push every 6 hours PRN Nausea and/or Vomiting  sodium chloride 0.9% lock flush 10 milliLiter(s) IV Push every 1 hour PRN Pre/post blood products, medications, blood draw, and to maintain line patency    Vital Signs Last 24 Hrs  T(F): 100.1 (05 Aug 2019 05:00), Max: 101.4 (04 Aug 2019 17:00)  HR: 102 (05 Aug 2019 05:00) (102 - 122)  BP: 140/70 (05 Aug 2019 05:00) (140/70 - 155/83)  RR: 15 (05 Aug 2019 05:00) (15 - 18)  SpO2: 96% (05 Aug 2019 05:00) (96% - 98%)  I&O's Summary    04 Aug 2019 07:  -  05 Aug 2019 07:00  --------------------------------------------------------  IN: 1540 mL / OUT: 3200 mL / NET: -1660 mL    05 Aug 2019 07:01  -  05 Aug 2019 13:50  --------------------------------------------------------  IN: 1180 mL / OUT: 0 mL / NET: 1180 mL      PHYSICAL EXAM:  General: NAD, very confused.  ENT: MMM  Neck: Supple, No JVD  Lungs: Clear to auscultation bilaterally  Cardio: RRR, S1/S2, No murmurs  Abdomen: Soft, Nontender, Nondistended; Bowel sounds present  Extremities: No calf tenderness, left knee with edema/effusion, no erythema, no warmth, decreased ROM  Neuro:  unable to follow commands    LABS:                        9.6    10.10 )-----------( 319      ( 05 Aug 2019 06:05 )             29.4         142  |  107  |  13  ----------------------------<  135  3.5   |  23  |  1.14    Ca    8.1      05 Aug 2019 06:05      eGFR if Non African American: 62 mL/min/1.73M2 (19 @ 06:05)  eGFR if African American: 72 mL/min/1.73M2 (19 @ 06:05)      Lactate, Blood: 0.5 mmol/L ( @ 06:05)  Lactate, Blood: 1.0 mmol/L ( @ 19:45)                      POCT Blood Glucose.: 173 mg/dL (05 Aug 2019 11:47)  POCT Blood Glucose.: 129 mg/dL (05 Aug 2019 07:43)  POCT Blood Glucose.: 113 mg/dL (04 Aug 2019 22:56)  POCT Blood Glucose.: 157 mg/dL (04 Aug 2019 16:40)    07-28 [id number] 5.7    Urinalysis Basic - ( 02 Aug 2019 18:31 )    Color: Yellow / Appearance: Clear / S.015 / pH: x  Gluc: x / Ketone: Negative  / Bili: Negative / Urobili: Negative   Blood: x / Protein: 100 / Nitrite: Negative   Leuk Esterase: Moderate / RBC: >50 /HPF / WBC 11-25 /HPF   Sq Epi: x / Non Sq Epi: Neg.-Few / Bacteria: Few /HPF        Culture - Blood (collected 02 Aug 2019 20:40)  Source: .Blood Blood-Peripheral  Preliminary Report (04 Aug 2019 02:01):    No growth to date.    Culture - Blood (collected 02 Aug 2019 20:30)  Source: .Blood Blood-Peripheral  Preliminary Report (04 Aug 2019 02:01):    No growth to date.        RADIOLOGY & ADDITIONAL TESTS:    Care Discussed with Consultants/Other Providers:

## 2019-08-05 NOTE — PROGRESS NOTE ADULT - SUBJECTIVE AND OBJECTIVE BOX
Progress: f/u palliative , pt more awake, alert, answers  simple questions. Wife  at bedside.     Present Symptoms:   Dyspnea: mild  Nausea/Vomiting: no  Anxiety:  no  Depressed Mood:   Fatigue: mild  Loss of appetite: no  Pain:     no               location:   Review of Systems:  Unable to obtain due to poor mentation]    MEDICATIONS  (STANDING):  amLODIPine   Tablet 5 milliGRAM(s) Oral daily  cefTRIAXone   IVPB 1000 milliGRAM(s) IV Intermittent every 24 hours  cefTRIAXone   IVPB      dextrose 5%. 1000 milliLiter(s) (50 mL/Hr) IV Continuous <Continuous>  dextrose 50% Injectable 12.5 Gram(s) IV Push once  dextrose 50% Injectable 25 Gram(s) IV Push once  dextrose 50% Injectable 25 Gram(s) IV Push once  insulin lispro (HumaLOG) corrective regimen sliding scale   SubCutaneous Before meals and at bedtime  latanoprost 0.005% Ophthalmic Solution 1 Drop(s) Both EYES at bedtime  lisinopril 40 milliGRAM(s) Oral daily  pantoprazole    Tablet 40 milliGRAM(s) Oral two times a day  polyethylene glycol 3350 17 Gram(s) Oral daily  sodium chloride 0.9%. 1000 milliLiter(s) (100 mL/Hr) IV Continuous <Continuous>  tamsulosin 0.8 milliGRAM(s) Oral at bedtime    MEDICATIONS  (PRN):  acetaminophen   Tablet .. 650 milliGRAM(s) Oral every 6 hours PRN Mild Pain (1 - 3)  acetaminophen   Tablet .. 650 milliGRAM(s) Oral every 6 hours PRN Temp greater or equal to 38C (100.4F)  ondansetron Injectable 4 milliGRAM(s) IV Push every 6 hours PRN Nausea and/or Vomiting  sodium chloride 0.9% lock flush 10 milliLiter(s) IV Push every 1 hour PRN Pre/post blood products, medications, blood draw, and to maintain line patency      PHYSICAL EXAM:  Vital Signs Last 24 Hrs  T(C): 37.8 (05 Aug 2019 05:00), Max: 38.6 (04 Aug 2019 17:00)  T(F): 100.1 (05 Aug 2019 05:00), Max: 101.4 (04 Aug 2019 17:00)  HR: 102 (05 Aug 2019 05:00) (102 - 122)  BP: 140/70 (05 Aug 2019 05:00) (140/70 - 155/83)  BP(mean): --  RR: 15 (05 Aug 2019 05:00) (15 - 18)  SpO2: 96% (05 Aug 2019 05:00) (96% - 98%)  General: alert  oriented to self      HEENT: n/c, a/t     Lungs: ess clear dim to bases    CV: s1s2    GI: soft, n/t + bs     : redmond    Musculoskeletal: weak x 4    Skin: warm dry    Neuro: alert,  answers  few words in  english   Oral intake ability:  oral feeding w/ assist  Diet: soft as colette      LABS:                          9.6    10.10 )-----------( 319      ( 05 Aug 2019 06:05 )             29.4     08-05    142  |  107  |  13  ----------------------------<  135<H>  3.5   |  23  |  1.14    Ca    8.1<L>      05 Aug 2019 06:05          RADIOLOGY & ADDITIONAL STUDIES:    ADVANCE DIRECTIVES: Full code   Advanced Care Planning discussion total time spent:

## 2019-08-05 NOTE — PROGRESS NOTE ADULT - ASSESSMENT
77 yo M with h/o dementia, here with agitation, functional decline. Recently extubated after upperGIB-s/p EGD with gastric clipping. Course c/b aspiration PNA, in which patient completed abx.  Also with urinary retention.  ID, URO, GI, and palliative following. Blood cultures continue with no growth.    Aspiration PNA:   resolving- ABx was completed   recent CXR with R infiltrate with improvement.  ID consult continue to follow  Afebrile currently 100F rectally, spike fever last night 8pm (100.8)  tylenol for fever.   Blood cultures: gram positive Cocci Streptococcus SP growth x 1 bottle: likely contaminant as per ID.  Appreciate S+S: continue with puree diet.    Leukocytosis:  down trend (11.74-12.95)  continue to monitor- trend CBC and BMP    Upper GI Bleed:  Hgb stable  C/W PO protonix BID  GI signed off--will follow up with Dr Thomas in 2 weeks    Urinary Retention:  maintain redmond placement  Appreciate  consult-Dr Gates as per below:  c/w flomax 0.8mg, avoid any anticholenergics, delayed voiding trials until after rehab.    OLVIN:  stable (Cr 1.15 -1.18)  avoid nephrotoxic meds    HTN:  Cont norvasc and lisinopril    L ankle pain:  X ray neg for fx but soft tissue swelling  Uric acid nml    DVT ppx: Venodynes (holding SQH in setting of GIB)  Dispo: Wife wants pt to go to acute rehab. Awaiting PM+R consult 79 yo M with h/o dementia, here with agitation, functional decline. Recently extubated after upperGIB-s/p EGD with gastric clipping. Course c/b aspiration PNA, in which patient completed abx.  Also with urinary retention.  ID, URO, GI, and palliative following.     A/P:    1. Fevers, unknown etiology  -pt completed course of antibiotics for Asp PNA  1. Aspiration PNA:   resolving- ABx was completed   recent CXR with R infiltrate with improvement.  ID consult continue to follow  Afebrile currently 100F rectally, spike fever last night 8pm (100.8)  tylenol for fever.   Blood cultures: gram positive Cocci Streptococcus SP growth x 1 bottle: likely contaminant as per ID.  Appreciate S+S: continue with puree diet.    Leukocytosis:  down trend (11.74-12.95)  continue to monitor- trend CBC and BMP    Upper GI Bleed:  Hgb stable  C/W PO protonix BID  GI signed off--will follow up with Dr Thomas in 2 weeks    Urinary Retention:  maintain redmond placement  Appreciate  consult-Dr Gates as per below:  c/w flomax 0.8mg, avoid any anticholenergics, delayed voiding trials until after rehab.    OLVIN:  stable (Cr 1.15 -1.18)  avoid nephrotoxic meds    HTN:  Cont norvasc and lisinopril    L ankle pain:  X ray neg for fx but soft tissue swelling  Uric acid nml    DVT ppx: Venodynes (holding SQH in setting of GIB)  Dispo: Wife wants pt to go to acute rehab. Awaiting PM+R consult 77 yo M with h/o dementia, here with agitation, functional decline. Recently extubated after upperGIB-s/p EGD with gastric clipping. Course c/b aspiration PNA, in which patient completed abx.  Also with urinary retention.  ID, URO, GI, and palliative following.     A/P:    1. Fevers, unknown etiology  -pt completed course of antibiotics for Asp PNA 3 days ago, recent CXR with improvement of right infiltrate  - restarted on empiric antibx yesterday due to persistent fevers, Tmax 101.4  -per ID opinion pt with possible Gout in left knee; Ortho consult pending for possible tap  -continue to follow cbc, fever curve  -Blood cultures: gram positive Cocci Streptococcus SP growth x 1 bottle: likely contaminant as per ID.    2.  s/p Upper GI Bleed; gastric ulcer found on EGD  -Hgb stable, 9.6  -c/w  PO protonix BID  -GI signed off--will follow up with Dr Thomas in 2 weeks    3.  Urinary Retention:  -maintain redmond placement  -Appreciate  consult; c/w flomax 0.8mg, avoid any anticholenergics, delayed voiding trials until after rehab.    4.  OLVIN:  stable (Cr 1.15 -1.18- 1.14)  avoid nephrotoxic meds    5.  HTN:  Cont norvasc and lisinopril    6.  Left knee effusion with DJD, possible Gout flare:  X ray neg for Fx -- has effusion  Ortho pending for possible tap    7.  Dementia; Alzheimers type; progressive and severe:  -continue Supportive care, Neurology consult appreciated

## 2019-08-05 NOTE — PROGRESS NOTE ADULT - SUBJECTIVE AND OBJECTIVE BOX
CC: f/u for fever and pneumonia    Patient reports: he is pleasantly demented.minimal cough, left knee know swollen, redmond for retention    REVIEW OF SYSTEMS:  All other review of systems negative (Comprehensive ROS)    Antimicrobials Day #  :day 2  cefTRIAXone   IVPB 1000 milliGRAM(s) IV Intermittent every 24 hours  cefTRIAXone   IVPB        Other Medications Reviewed    T(F): 100.1 (08-05-19 @ 05:00), Max: 101.4 (08-04-19 @ 17:00)  HR: 102 (08-05-19 @ 05:00)  BP: 140/70 (08-05-19 @ 05:00)  RR: 15 (08-05-19 @ 05:00)  SpO2: 96% (08-05-19 @ 05:00)  Wt(kg): --    PHYSICAL EXAM:  General: alert, no acute distress  Eyes:  anicteric, no conjunctival injection, no discharge  Oropharynx: no lesions or injection 	  Neck: supple, without adenopathy  Lungs: clear to auscultation  Heart: regular rate and rhythm; no murmur, rubs or gallops  Abdomen: soft, nondistended, nontender, without mass or organomegaly  Skin: no lesions  Extremities: no clubbing, cyanosis, or edema  Neurologic: alert, pleasantly demented, moves all extremities  Left knee is swollen and warm  LAB RESULTS:                        9.6    10.10 )-----------( 319      ( 05 Aug 2019 06:05 )             29.4     08-05    142  |  107  |  13  ----------------------------<  135<H>  3.5   |  23  |  1.14    Ca    8.1<L>      05 Aug 2019 06:05          MICROBIOLOGY:  RECENT CULTURES:  08-02 @ 20:40 .Blood Blood-Peripheral     No growth to date.      08-02 @ 20:30 .Blood Blood-Peripheral     No growth to date.          RADIOLOGY REVIEWED:    < from: Xray Chest 1 View-PORTABLE IMMEDIATE (08.02.19 @ 19:13) >  INTERPRETATION:  Single view chest    History aspiration    comparison 07/31/19    Right upper lung opacity has partially improved with mild residual. There   is a persistently poor inspiratory result without new consolidation,   pneumothorax or layering effusion.    < end of copied text >

## 2019-08-05 NOTE — PROGRESS NOTE ADULT - PROBLEM SELECTOR PLAN 1
Continue pantoprazole PO BID  Diet as tolerated  Patient will follow-up in office 2 weeks after discharge

## 2019-08-06 LAB
ANION GAP SERPL CALC-SCNC: 11 MMOL/L — SIGNIFICANT CHANGE UP (ref 5–17)
B PERT IGG+IGM PNL SER: ABNORMAL
BUN SERPL-MCNC: 14 MG/DL — SIGNIFICANT CHANGE UP (ref 7–23)
CALCIUM SERPL-MCNC: 8.6 MG/DL — SIGNIFICANT CHANGE UP (ref 8.4–10.5)
CHLORIDE SERPL-SCNC: 106 MMOL/L — SIGNIFICANT CHANGE UP (ref 96–108)
CO2 SERPL-SCNC: 25 MMOL/L — SIGNIFICANT CHANGE UP (ref 22–31)
COLOR FLD: YELLOW — SIGNIFICANT CHANGE UP
CREAT SERPL-MCNC: 1.12 MG/DL — SIGNIFICANT CHANGE UP (ref 0.5–1.3)
FLUID INTAKE SUBSTANCE CLASS: SIGNIFICANT CHANGE UP
FLUID SEGMENTED GRANULOCYTES: 96 % — SIGNIFICANT CHANGE UP
GLUCOSE BLDC GLUCOMTR-MCNC: 104 MG/DL — HIGH (ref 70–99)
GLUCOSE BLDC GLUCOMTR-MCNC: 137 MG/DL — HIGH (ref 70–99)
GLUCOSE BLDC GLUCOMTR-MCNC: 143 MG/DL — HIGH (ref 70–99)
GLUCOSE BLDC GLUCOMTR-MCNC: 144 MG/DL — HIGH (ref 70–99)
GLUCOSE SERPL-MCNC: 152 MG/DL — HIGH (ref 70–99)
GRAM STN FLD: SIGNIFICANT CHANGE UP
HCT VFR BLD CALC: 32.3 % — LOW (ref 39–50)
HGB BLD-MCNC: 10.3 G/DL — LOW (ref 13–17)
MCHC RBC-ENTMCNC: 28.3 PG — SIGNIFICANT CHANGE UP (ref 27–34)
MCHC RBC-ENTMCNC: 31.9 GM/DL — LOW (ref 32–36)
MCV RBC AUTO: 88.7 FL — SIGNIFICANT CHANGE UP (ref 80–100)
MONOS+MACROS # FLD: 4 % — SIGNIFICANT CHANGE UP
NRBC # BLD: 0 /100 WBCS — SIGNIFICANT CHANGE UP (ref 0–0)
PLATELET # BLD AUTO: 356 K/UL — SIGNIFICANT CHANGE UP (ref 150–400)
POTASSIUM SERPL-MCNC: 3.5 MMOL/L — SIGNIFICANT CHANGE UP (ref 3.5–5.3)
POTASSIUM SERPL-SCNC: 3.5 MMOL/L — SIGNIFICANT CHANGE UP (ref 3.5–5.3)
RBC # BLD: 3.64 M/UL — LOW (ref 4.2–5.8)
RBC # FLD: 13 % — SIGNIFICANT CHANGE UP (ref 10.3–14.5)
RCV VOL RI: 27 /UL — HIGH (ref 0–0)
SODIUM SERPL-SCNC: 142 MMOL/L — SIGNIFICANT CHANGE UP (ref 135–145)
SPECIMEN SOURCE: SIGNIFICANT CHANGE UP
SYNOVIAL CRYSTALS CLARITY: ABNORMAL
SYNOVIAL CRYSTALS COLOR: ABNORMAL
SYNOVIAL CRYSTALS ID: ABNORMAL
SYNOVIAL CRYSTALS TUBE: SIGNIFICANT CHANGE UP
TOTAL NUCLEATED CELL COUNT, BODY FLUID: SIGNIFICANT CHANGE UP /UL
TUBE TYPE: SIGNIFICANT CHANGE UP
WBC # BLD: 12.35 K/UL — HIGH (ref 3.8–10.5)
WBC # FLD AUTO: 12.35 K/UL — HIGH (ref 3.8–10.5)

## 2019-08-06 PROCEDURE — 99223 1ST HOSP IP/OBS HIGH 75: CPT | Mod: 25

## 2019-08-06 PROCEDURE — 99233 SBSQ HOSP IP/OBS HIGH 50: CPT

## 2019-08-06 PROCEDURE — 99232 SBSQ HOSP IP/OBS MODERATE 35: CPT

## 2019-08-06 RX ORDER — ALPRAZOLAM 0.25 MG
0.25 TABLET ORAL EVERY 12 HOURS
Refills: 0 | Status: DISCONTINUED | OUTPATIENT
Start: 2019-08-06 | End: 2019-08-09

## 2019-08-06 RX ORDER — OXYCODONE AND ACETAMINOPHEN 5; 325 MG/1; MG/1
1 TABLET ORAL EVERY 6 HOURS
Refills: 0 | Status: DISCONTINUED | OUTPATIENT
Start: 2019-08-06 | End: 2019-08-09

## 2019-08-06 RX ADMIN — LISINOPRIL 40 MILLIGRAM(S): 2.5 TABLET ORAL at 06:11

## 2019-08-06 RX ADMIN — TAMSULOSIN HYDROCHLORIDE 0.8 MILLIGRAM(S): 0.4 CAPSULE ORAL at 21:35

## 2019-08-06 RX ADMIN — CEFTRIAXONE 100 MILLIGRAM(S): 500 INJECTION, POWDER, FOR SOLUTION INTRAMUSCULAR; INTRAVENOUS at 16:37

## 2019-08-06 RX ADMIN — PANTOPRAZOLE SODIUM 40 MILLIGRAM(S): 20 TABLET, DELAYED RELEASE ORAL at 17:40

## 2019-08-06 RX ADMIN — SODIUM CHLORIDE 100 MILLILITER(S): 9 INJECTION INTRAMUSCULAR; INTRAVENOUS; SUBCUTANEOUS at 06:10

## 2019-08-06 RX ADMIN — Medication 0.25 MILLIGRAM(S): at 17:41

## 2019-08-06 RX ADMIN — LATANOPROST 1 DROP(S): 0.05 SOLUTION/ DROPS OPHTHALMIC; TOPICAL at 21:35

## 2019-08-06 RX ADMIN — AMLODIPINE BESYLATE 5 MILLIGRAM(S): 2.5 TABLET ORAL at 06:11

## 2019-08-06 RX ADMIN — OXYCODONE AND ACETAMINOPHEN 1 TABLET(S): 5; 325 TABLET ORAL at 16:29

## 2019-08-06 RX ADMIN — OXYCODONE AND ACETAMINOPHEN 1 TABLET(S): 5; 325 TABLET ORAL at 17:20

## 2019-08-06 RX ADMIN — PANTOPRAZOLE SODIUM 40 MILLIGRAM(S): 20 TABLET, DELAYED RELEASE ORAL at 06:11

## 2019-08-06 RX ADMIN — POLYETHYLENE GLYCOL 3350 17 GRAM(S): 17 POWDER, FOR SOLUTION ORAL at 12:31

## 2019-08-06 NOTE — CONSULT NOTE ADULT - CONSULT REASON
L knee pain
GI Bleed
dementia
pneumonia
urinary retention
advanced directives
prolonged hospitalization for worsening confusion, GIB and aspiration pna now with worsened functional deficits
RRT for respiratory distress

## 2019-08-06 NOTE — CONSULT NOTE ADULT - REASON FOR ADMISSION
Worsening agitation and difficulty ambulating

## 2019-08-06 NOTE — PROGRESS NOTE ADULT - SUBJECTIVE AND OBJECTIVE BOX
Patient is a 78y old  Male who presents with a chief complaint of Worsening agitation and difficulty ambulating (05 Aug 2019 15:47)    HPI:  77 yo M with PMHx of Alzheimer Dementia, gout, HTN brought in by wife from home for declining mental status and difficulty ambulating x 1 week. History obtained from pt's wife at bedside. Pt has known dementia but over the past week, his wife has noticed that he had difficulty ambulating with swelling of both feet, decreased PO intake and worsening agitation.Pt was seen by his PCP, Dr. Adame who started treatment for gout and sent Rx for steroids but his wife has not picked up the prescription.  He has been taking Alprazolam for many years, but recently also prescribed Zolpidem at bedtime as needed for increased agitation. His wife also notices decrease in urine output, denies recent illness, falls, LOC, fevers, chills, nausea, vomiting, diarrhea (25 Jul 2019 16:55)    recurrent retention    Interval Events:  Patient seen and examined at bedside.    MEDICATIONS:  MEDICATIONS  (STANDING):  amLODIPine   Tablet 5 milliGRAM(s) Oral daily  cefTRIAXone   IVPB 1000 milliGRAM(s) IV Intermittent every 24 hours  cefTRIAXone   IVPB      dextrose 5%. 1000 milliLiter(s) (50 mL/Hr) IV Continuous <Continuous>  dextrose 50% Injectable 12.5 Gram(s) IV Push once  dextrose 50% Injectable 25 Gram(s) IV Push once  dextrose 50% Injectable 25 Gram(s) IV Push once  insulin lispro (HumaLOG) corrective regimen sliding scale   SubCutaneous Before meals and at bedtime  latanoprost 0.005% Ophthalmic Solution 1 Drop(s) Both EYES at bedtime  lisinopril 40 milliGRAM(s) Oral daily  pantoprazole    Tablet 40 milliGRAM(s) Oral two times a day  polyethylene glycol 3350 17 Gram(s) Oral daily  sodium chloride 0.9%. 1000 milliLiter(s) (100 mL/Hr) IV Continuous <Continuous>  tamsulosin 0.8 milliGRAM(s) Oral at bedtime    MEDICATIONS  (PRN):  acetaminophen   Tablet .. 650 milliGRAM(s) Oral every 6 hours PRN Mild Pain (1 - 3)  acetaminophen   Tablet .. 650 milliGRAM(s) Oral every 6 hours PRN Temp greater or equal to 38C (100.4F)  ondansetron Injectable 4 milliGRAM(s) IV Push every 6 hours PRN Nausea and/or Vomiting  sodium chloride 0.9% lock flush 10 milliLiter(s) IV Push every 1 hour PRN Pre/post blood products, medications, blood draw, and to maintain line patency      Allergies    No Known Allergies    Intolerances        T(C): 37.2 (08-06-19 @ 05:55), Max: 38.6 (08-04-19 @ 17:00)  T(F): 99 (08-06-19 @ 05:55), Max: 101.4 (08-04-19 @ 17:00)  HR: 94 (08-06-19 @ 05:55) (94 - 122)  BP: 128/70 (08-06-19 @ 05:55) (128/70 - 155/83)  RR: 16 (08-06-19 @ 05:55) (15 - 18)  SpO2: 98% (08-06-19 @ 05:55) (96% - 98%)          08-04-19 @ 07:01  -  08-05-19 @ 07:00  --------------------------------------------------------  IN:  Total IN: 0 mL    OUT:    Indwelling Catheter - Urethral: 3200 mL  Total OUT: 3200 mL    Total NET: -3200 mL      08-05-19 @ 07:01  -  08-06-19 @ 07:00  --------------------------------------------------------  IN:  Total IN: 0 mL    OUT:    Indwelling Catheter - Urethral: 2525 mL  Total OUT: 2525 mL    Total NET: -2525 mL          LABS:      CBC Full  -  ( 05 Aug 2019 06:05 )  WBC Count : 10.10 K/uL  RBC Count : 3.30 M/uL  Hemoglobin : 9.6 g/dL  Hematocrit : 29.4 %  Platelet Count - Automated : 319 K/uL  Mean Cell Volume : 89.1 fl  Mean Cell Hemoglobin : 29.1 pg  Mean Cell Hemoglobin Concentration : 32.7 gm/dL  Auto Neutrophil # : x  Auto Lymphocyte # : x  Auto Monocyte # : x  Auto Eosinophil # : x  Auto Basophil # : x  Auto Neutrophil % : x  Auto Lymphocyte % : x  Auto Monocyte % : x  Auto Eosinophil % : x  Auto Basophil % : x    08-05    142  |  107  |  13  ----------------------------<  135<H>  3.5   |  23  |  1.14    Ca    8.1<L>      05 Aug 2019 06:05                    Physical Exam    Constitutional: drowsy, arousable,  no acute distress    Abdomen: soft, nontender, nondistended, no HSM    Genitourinary: no bladder distention, foely yellow

## 2019-08-06 NOTE — PROGRESS NOTE ADULT - SUBJECTIVE AND OBJECTIVE BOX
Patient is a 78y old  Male who presents with a chief complaint of Worsening agitation and difficulty ambulating (06 Aug 2019 10:17)  Patient seen and examined at bedside.  No events overnight.    ALLERGIES:  No Known Allergies    MEDICATIONS  (STANDING):  amLODIPine   Tablet 5 milliGRAM(s) Oral daily  cefTRIAXone   IVPB 1000 milliGRAM(s) IV Intermittent every 24 hours  cefTRIAXone   IVPB      dextrose 5%. 1000 milliLiter(s) (50 mL/Hr) IV Continuous <Continuous>  dextrose 50% Injectable 12.5 Gram(s) IV Push once  dextrose 50% Injectable 25 Gram(s) IV Push once  dextrose 50% Injectable 25 Gram(s) IV Push once  insulin lispro (HumaLOG) corrective regimen sliding scale   SubCutaneous Before meals and at bedtime  latanoprost 0.005% Ophthalmic Solution 1 Drop(s) Both EYES at bedtime  lisinopril 40 milliGRAM(s) Oral daily  pantoprazole    Tablet 40 milliGRAM(s) Oral two times a day  polyethylene glycol 3350 17 Gram(s) Oral daily  tamsulosin 0.8 milliGRAM(s) Oral at bedtime    MEDICATIONS  (PRN):  acetaminophen   Tablet .. 650 milliGRAM(s) Oral every 6 hours PRN Mild Pain (1 - 3)  acetaminophen   Tablet .. 650 milliGRAM(s) Oral every 6 hours PRN Temp greater or equal to 38C (100.4F)  ondansetron Injectable 4 milliGRAM(s) IV Push every 6 hours PRN Nausea and/or Vomiting  sodium chloride 0.9% lock flush 10 milliLiter(s) IV Push every 1 hour PRN Pre/post blood products, medications, blood draw, and to maintain line patency    Vital Signs Last 24 Hrs  T(F): 99 (06 Aug 2019 05:55), Max: 99 (05 Aug 2019 21:36)  HR: 94 (06 Aug 2019 05:55) (94 - 118)  BP: 128/70 (06 Aug 2019 05:55) (128/70 - 140/85)  RR: 16 (06 Aug 2019 05:55) (15 - 16)  SpO2: 98% (06 Aug 2019 05:55) (97% - 98%)  I&O's Summary    05 Aug 2019 07:01  -  06 Aug 2019 07:00  --------------------------------------------------------  IN: 3020 mL / OUT: 2525 mL / NET: 495 mL    06 Aug 2019 07:01  -  06 Aug 2019 10:43  --------------------------------------------------------  IN: 300 mL / OUT: 0 mL / NET: 300 mL      PHYSICAL EXAM:  General: NAD, A/O x O.  ENT: MMM  Neck: Supple, No JVD  Lungs: Clear to auscultation bilaterally  Cardio: RRR, S1/S2, No murmurs  Abdomen: Soft, Nontender, Nondistended; Bowel sounds present  Extremities: left knee with edema/effusion, +warmth, no erythema, decreased ROM  Neuro:  pt unable to follow commands; minimally verbal    LABS:                        10.3   12.35 )-----------( 356      ( 06 Aug 2019 07:55 )             32.3     08-06    142  |  106  |  14  ----------------------------<  152  3.5   |  25  |  1.12    Ca    8.6      06 Aug 2019 07:55      eGFR if Non African American: 63 mL/min/1.73M2 (08-06-19 @ 07:55)  eGFR if African American: 73 mL/min/1.73M2 (08-06-19 @ 07:55)      Lactate, Blood: 0.5 mmol/L (08-05 @ 06:05)          POCT Blood Glucose.: 144 mg/dL (06 Aug 2019 08:17)  POCT Blood Glucose.: 168 mg/dL (05 Aug 2019 21:45)  POCT Blood Glucose.: 125 mg/dL (05 Aug 2019 17:40)  POCT Blood Glucose.: 173 mg/dL (05 Aug 2019 11:47)    07-28 [id number] 5.7        Culture - Blood (collected 02 Aug 2019 20:40)  Source: .Blood Blood-Peripheral  Preliminary Report (04 Aug 2019 02:01):    No growth to date.    Culture - Blood (collected 02 Aug 2019 20:30)  Source: .Blood Blood-Peripheral  Preliminary Report (04 Aug 2019 02:01):    No growth to date.        RADIOLOGY & ADDITIONAL TESTS:    Care Discussed with Consultants/Other Providers:

## 2019-08-06 NOTE — PROGRESS NOTE ADULT - ASSESSMENT
77 yo M with h/o dementia, here with agitation, functional decline. Recently extubated after upperGIB-s/p EGD with gastric clipping. Course c/b aspiration PNA, in which patient completed abx.  Also with urinary retention.  ID, URO, GI, and palliative following.   D/C planning for LUPE when stable.    A/P:    1. Fevers, unknown etiology  -pt completed course of antibiotics for Asp PNA and repeat CXR with improvement of right infiltrate  - restarted on empiric antibx 8/4 due to persistent fevers, Tmax 101.4; has been afebrile this am   -per ID pt with possible Gout +/- infection in left knee; Rheumatology came this morning and tapped knee; await cytology  -continue to follow cbc, fever curve  -Blood cultures: gram positive Cocci Streptococcus SP growth x 1 bottle: likely contaminant as per ID.    2.  s/p Upper GI Bleed; gastric ulcer found on EGD  -Hgb stable, 10.3  -c/w  PO protonix BID  -GI signed off--will follow up with Dr Thomas in 2 weeks    3.  Urinary Retention:  -maintain redmond placement  -Appreciate  consult; c/w flomax 0.8mg, avoid any anticholenergics, delayed voiding trials until after rehab.    4.  OLVIN:  -continue to follow Creat.   (Cr 1.15 -1.18- 1.14 - 1.12)  -avoid nephrotoxic meds    5.  HTN:  Cont norvasc and lisinopril    6.  Left knee effusion with DJD, possible Gout flare:  X ray neg for Fx -- has effusion  Rheumatology eval this morning; awaiting note.  Pt had left knee tapped.    7.  Dementia; Alzheimers type; progressive and severe:  -continue Supportive care, Neurology consult appreciated

## 2019-08-06 NOTE — PROGRESS NOTE ADULT - ASSESSMENT
A/P: 77 yo M with PMHx of Alzheimer Dementia, gout, HTN brought in by wife from home for declining mental status and difficulty ambulating x 1 week.   Pt has known dementia and his wife has noticed that he had difficulty ambulating with swelling of both feet, decreased PO intake and worsening agitation.  Alzheimer's dementia with behavioral disturbance.  CT head negative for acute pathology, worsening agitation likely   r/t progressive dementia.   s/p Acute respiratory failure w/ intubation  Due to  Aspiration Pneumonitis.   Urinary retention, seen by urology keep redmond for now, added  flomax, repeat voiding trial after rehab    Upper GIB with Acute blood loss anemia s/p EGD s/p Clips for visible vessel ulcer, currently resolved, on PPI , f/b GI    palliative: Pt is more awake, alert today. Remains confused, speaking mostly [place]. Looks comfortable at present .  To be seen by ortho today for L knee swelling.   wife not at bedside currently, will cont to follow w/ med team . GOC  full code and likely lionel.    Plan to review MOLST form w/ wife when receptive. A/P: 77 yo M with PMHx of Alzheimer Dementia, gout, HTN brought in by wife from home for declining mental status and difficulty ambulating x 1 week.   Pt has known dementia and his wife has noticed that he had difficulty ambulating with swelling of both feet, decreased PO intake and worsening agitation.  Alzheimer's dementia with behavioral disturbance.  CT head negative for acute pathology, worsening agitation likely   r/t progressive dementia.   s/p Acute respiratory failure w/ intubation  Due to  Aspiration Pneumonitis.   Urinary retention, seen by urology keep redmond for now, added  flomax, repeat voiding trial after rehab    Upper GIB with Acute blood loss anemia s/p EGD s/p Clips for visible vessel ulcer, currently resolved, on PPI , f/b GI    palliative: Pt is more awake, alert today. Remains confused, speaking mostly [place]otho. Looks comfortable at present .  To be seen by ortho today for L knee swelling.   wife not at bedside currently, will cont to follow w/ med team . GOC  full code and likely lionel.    Plan to review MOLST form w/ wife when receptive..

## 2019-08-06 NOTE — PROGRESS NOTE ADULT - SUBJECTIVE AND OBJECTIVE BOX
CC: f/u for fever and pneumonia    Patient remains pleasantly confused; s/p L knee tap earlier    REVIEW OF SYSTEMS:  All other review of systems negative (Comprehensive ROS)    Antimicrobials Day # 3  cefTRIAXone   IVPB 1000 milliGRAM(s) IV Intermittent every 24 hours  cefTRIAXone   IVPB        Other Medications Reviewed    Vital Signs Last 24 Hrs  T(F): 98.5 (06 Aug 2019 16:00), Max: 99 (05 Aug 2019 21:36)  HR: 122 (06 Aug 2019 16:00) (94 - 122)  BP: 145/67 (06 Aug 2019 16:00) (128/70 - 145/67)  BP(mean): --  RR: 16 (06 Aug 2019 16:00) (15 - 16)  SpO2: 97% (06 Aug 2019 16:00) (97% - 98%)    PHYSICAL EXAM:  General: alert, no acute distress  Eyes: anicteric, no conjunctival injection, no discharge  Oropharynx: no lesions or injection 	  Neck: supple, without adenopathy  Lungs: clear to auscultation  Heart: regular rate and rhythm; no murmur, rubs or gallops  Abdomen: soft, nondistended, nontender, without mass or organomegaly  Marrero  Skin: no lesions  Extremities: L knee effusion  Neurologic: alert, confused, moves all extremities    LAB RESULTS:                        10.3   12.35 )-----------( 356      ( 06 Aug 2019 07:55 )             32.3   08-06    142  |  106  |  14  ----------------------------<  152<H>  3.5   |  25  |  1.12    Ca    8.6      06 Aug 2019 07:55      MICROBIOLOGY:  RECENT CULTURES:  Culture - Body Fluid with Gram Stain (08.06.19 @ 09:00)    Gram Stain:   polymorphonuclear leukocytes seen per low power field  No organisms seen per oil power field  by cytocentrifuge    Specimen Source: .Body Fluid Synovial Fluid    08-02 @ 20:40 .Blood Blood-Peripheral     No growth to date.    08-02 @ 20:30 .Blood Blood-Peripheral     No growth to date.    RADIOLOGY REVIEWED:  Xray Chest 1 View-PORTABLE IMMEDIATE (08.02.19 @ 19:13) >  Right upper lung opacity has partially improved with mild residual. There   is a persistently poor inspiratory result without new consolidation,   pneumothorax or layering effusion.

## 2019-08-06 NOTE — PROGRESS NOTE ADULT - ASSESSMENT
77 yo M, dementia, here with agitation, functional decline  Coffee ground emesis, s/p EGD- clipped gastric ulcer  Followed by resp failure, brief intubation  Low grade fever   Minimal leukocytosis  Sputum normal nghia only  Alpha Strep in single Bld Cx a contaminant  CXR with evolution of R infiltrate, confirming clinical suspicion of aspiration pneumonia  Marrero placed, failed trial of void  CTX restarted 8/4  Given left knee effusion and prior ankle swelling last week, ? if fevers could reflect crystal induced arthritis- s/p arthrocentesis earlier- fluid gram stain negative  Respiratory status remains improved    Plan:  CTX for now- anticipate limited course  Await further testing of joint fluid, crystal analysis  Follow temps and CBC/diff   D/w PA

## 2019-08-06 NOTE — CONSULT NOTE ADULT - PROVIDER SPECIALTY LIST ADULT
Gastroenterology
Infectious Disease
Neurology
Urology
Palliative Care
Rehab Medicine
Critical Care
Rheumatology

## 2019-08-06 NOTE — CONSULT NOTE ADULT - ASSESSMENT
79 yo M admitted with b/l feet swelling/warmth/pain, mildly elevated Cr and AMS. Hospital course c/b GIB, aspiration PNA, urinary retention and now with recurrence of fevers in setting of chronic L knee effusion, initially seen on admission but with worsening pain in last few days. Imaging suggestive of OA and crystalline arthritis can be seen in OA joints. Also concern for infection given recurrence of fevers. S/p arthrocentesis.     - f/u gram stain, cx, crystals, cell count  - if gram stain negative, would consider IA steroid injection tomorrow to limit systemic exposure given recent GIB  - will follow  - avoid NSAIDs, tylenol or topical lidoderm patch prn pain

## 2019-08-06 NOTE — CHART NOTE - NSCHARTNOTEFT_GEN_A_CORE
79 y/o male s/p recent UGIB who is admitted to [facility]. S/P EGD with clipping for exposed vessel. Patient remains stable from GI standpoint. H/H stable. No melena, BRBPR, or hematemesis. Will sign off for now. Please re-consult as needed.

## 2019-08-06 NOTE — CONSULT NOTE ADULT - SUBJECTIVE AND OBJECTIVE BOX
77 yo M with h/o dementia, admitted with agitation, functional decline, and b/l feet swelling. Considered possibly gout by PMD but patient never started steroids. On admission, pt had b/l foot pain/swelling and L knee effusion but without pain. History obtained from wife at bedside who reports that pt had meniscal tear in that knee years ago, told he had OA, but never had pain. She noted swelling over L knee a few weeks before admission, but came due to the foot pain limiting his ambulation. Hospital course has been complicated by upperGIB-s/p EGD with gastric clipping, aspiration PNA, for which patient completed abx, and urinary retention. Then with new fevers and leukocytosis starting on 8/4 and ?worsening CXR, resumed on Abx. ?of L knee infection vs crystalline arthritis causing fevers. Initial Bcx 1/4 + strep, felt to be contaminant, repeat Bcx NGTD.     No prior episodes of crystalline arthritis or painful/swollen great toe, ankle, knee. Wife reports that L knee was more tender and warm yesterday, has improved since.     PMH - dementia, GIB, OLVIN, HTN  PSH - inguinal hernia repair  FH - no hx of gout  SH - former tobacco x 30 years, social ETOH  NKDA  Med rec per HPI    PE: afebrile this morning, vitals stable  Gen - pt awake, alert, Wallisian speaking, follows commands  HEENT - MMM, no conjunctival injection   CVS - s1/s2, rrr  Lungs - crackles at b/l bases, cta b/l otherwise  Abd - soft, nt/nd, +bs  Ext - L knee with large suprapatellar effusion, mild warmth, and trace erythema over later aspect of joint, TTP over medial joint line with bony hypertrophy. + warmth over dorsum of b/l feet, TTP and mild fullness over ankles but no overt synovitis, no podagra. No tophi or other appreciable synovitis.   Skin - no rash     Labs reviewed, significant for leukocytosis, sUA 7  XR knee with OA and effusion, XR R foot with MTP OA changes     Arthrocentesis of L knee performed at bedside s/p written consent from wife after discussion of R/B/A. Site marked, sterilized with betadyne, anesthetized with ethyl chloride spray, and 21g needle introduced via lateral approach with aspiration of 28 cc of straw colored synovial fluid. Pt tolerated procedure well, no blood loss, band aid placed over site, post-procedural instructions provided. Fluid sent for gram stain/cx, cell count, crystals.

## 2019-08-06 NOTE — CONSULT NOTE ADULT - CONSULT REQUESTED DATE/TIME
06-Aug-2019 08:00
03-Aug-2019 14:31
26-Jul-2019 12:24
27-Jul-2019 15:26
29-Jul-2019 13:16
29-Jul-2019 16:16
02-Aug-2019 13:57
27-Jul-2019 12:05

## 2019-08-06 NOTE — PROGRESS NOTE ADULT - SUBJECTIVE AND OBJECTIVE BOX
Progress: f/u palliative: pt in bed, alert, confused , non focal, no s/s distress or discomfort    Present Symptoms:   Dyspnea: no  Nausea/Vomiting: no  Anxiety:  no  Depressed Mood:   Fatigue: mild  Loss of appetite: no  Pain:          no         location:   Review of Systems:  Unable to obtain due to poor mentation]    MEDICATIONS  (STANDING):  amLODIPine   Tablet 5 milliGRAM(s) Oral daily  cefTRIAXone   IVPB 1000 milliGRAM(s) IV Intermittent every 24 hours  cefTRIAXone   IVPB      dextrose 5%. 1000 milliLiter(s) (50 mL/Hr) IV Continuous <Continuous>  dextrose 50% Injectable 12.5 Gram(s) IV Push once  dextrose 50% Injectable 25 Gram(s) IV Push once  dextrose 50% Injectable 25 Gram(s) IV Push once  insulin lispro (HumaLOG) corrective regimen sliding scale   SubCutaneous Before meals and at bedtime  latanoprost 0.005% Ophthalmic Solution 1 Drop(s) Both EYES at bedtime  lisinopril 40 milliGRAM(s) Oral daily  pantoprazole    Tablet 40 milliGRAM(s) Oral two times a day  polyethylene glycol 3350 17 Gram(s) Oral daily  tamsulosin 0.8 milliGRAM(s) Oral at bedtime    MEDICATIONS  (PRN):  acetaminophen   Tablet .. 650 milliGRAM(s) Oral every 6 hours PRN Mild Pain (1 - 3)  acetaminophen   Tablet .. 650 milliGRAM(s) Oral every 6 hours PRN Temp greater or equal to 38C (100.4F)  ondansetron Injectable 4 milliGRAM(s) IV Push every 6 hours PRN Nausea and/or Vomiting  sodium chloride 0.9% lock flush 10 milliLiter(s) IV Push every 1 hour PRN Pre/post blood products, medications, blood draw, and to maintain line patency      PHYSICAL EXAM:  Vital Signs Last 24 Hrs  T(C): 37.2 (06 Aug 2019 05:55), Max: 37.2 (05 Aug 2019 21:36)  T(F): 99 (06 Aug 2019 05:55), Max: 99 (05 Aug 2019 21:36)  HR: 94 (06 Aug 2019 05:55) (94 - 118)  BP: 128/70 (06 Aug 2019 05:55) (128/70 - 140/85)  BP(mean): --  RR: 16 (06 Aug 2019 05:55) (15 - 16)  SpO2: 98% (06 Aug 2019 05:55) (97% - 98%)  General: alert  confused      HEENT: n/c, a/t , oral mucosa dry    Lungs: clear, dim to bases    CV: s1s2    GI: lrg., soft, n/t + bs     : redmond    Musculoskeletal: ramirez's,  weak x 4    Skin: warm dry     Neuro: alert, confused, speaks mostly [place] Islander      Oral intake ability:  oral feeding w/ assist   Diet: soft as colette      LABS:                          10.3   12.35 )-----------( 356      ( 06 Aug 2019 07:55 )             32.3     08-06    142  |  106  |  14  ----------------------------<  152<H>  3.5   |  25  |  1.12    Ca    8.6      06 Aug 2019 07:55          RADIOLOGY & ADDITIONAL STUDIES:    ADVANCE DIRECTIVES: Full code   Advanced Care Planning discussion total time spent:

## 2019-08-07 LAB
ANION GAP SERPL CALC-SCNC: 11 MMOL/L — SIGNIFICANT CHANGE UP (ref 5–17)
BUN SERPL-MCNC: 18 MG/DL — SIGNIFICANT CHANGE UP (ref 7–23)
CALCIUM SERPL-MCNC: 8.9 MG/DL — SIGNIFICANT CHANGE UP (ref 8.4–10.5)
CHLORIDE SERPL-SCNC: 106 MMOL/L — SIGNIFICANT CHANGE UP (ref 96–108)
CO2 SERPL-SCNC: 24 MMOL/L — SIGNIFICANT CHANGE UP (ref 22–31)
CREAT SERPL-MCNC: 1.33 MG/DL — HIGH (ref 0.5–1.3)
GLUCOSE BLDC GLUCOMTR-MCNC: 133 MG/DL — HIGH (ref 70–99)
GLUCOSE BLDC GLUCOMTR-MCNC: 145 MG/DL — HIGH (ref 70–99)
GLUCOSE BLDC GLUCOMTR-MCNC: 148 MG/DL — HIGH (ref 70–99)
GLUCOSE BLDC GLUCOMTR-MCNC: 150 MG/DL — HIGH (ref 70–99)
GLUCOSE SERPL-MCNC: 153 MG/DL — HIGH (ref 70–99)
HCT VFR BLD CALC: 29.2 % — LOW (ref 39–50)
HGB BLD-MCNC: 9.4 G/DL — LOW (ref 13–17)
MCHC RBC-ENTMCNC: 28.2 PG — SIGNIFICANT CHANGE UP (ref 27–34)
MCHC RBC-ENTMCNC: 32.2 GM/DL — SIGNIFICANT CHANGE UP (ref 32–36)
MCV RBC AUTO: 87.7 FL — SIGNIFICANT CHANGE UP (ref 80–100)
NRBC # BLD: 0 /100 WBCS — SIGNIFICANT CHANGE UP (ref 0–0)
PLATELET # BLD AUTO: 349 K/UL — SIGNIFICANT CHANGE UP (ref 150–400)
POTASSIUM SERPL-MCNC: 3.9 MMOL/L — SIGNIFICANT CHANGE UP (ref 3.5–5.3)
POTASSIUM SERPL-SCNC: 3.9 MMOL/L — SIGNIFICANT CHANGE UP (ref 3.5–5.3)
RBC # BLD: 3.33 M/UL — LOW (ref 4.2–5.8)
RBC # FLD: 13.2 % — SIGNIFICANT CHANGE UP (ref 10.3–14.5)
SODIUM SERPL-SCNC: 141 MMOL/L — SIGNIFICANT CHANGE UP (ref 135–145)
WBC # BLD: 10.39 K/UL — SIGNIFICANT CHANGE UP (ref 3.8–10.5)
WBC # FLD AUTO: 10.39 K/UL — SIGNIFICANT CHANGE UP (ref 3.8–10.5)

## 2019-08-07 PROCEDURE — 99233 SBSQ HOSP IP/OBS HIGH 50: CPT

## 2019-08-07 RX ADMIN — AMLODIPINE BESYLATE 5 MILLIGRAM(S): 2.5 TABLET ORAL at 05:32

## 2019-08-07 RX ADMIN — PANTOPRAZOLE SODIUM 40 MILLIGRAM(S): 20 TABLET, DELAYED RELEASE ORAL at 17:01

## 2019-08-07 RX ADMIN — Medication 0.25 MILLIGRAM(S): at 22:31

## 2019-08-07 RX ADMIN — LATANOPROST 1 DROP(S): 0.05 SOLUTION/ DROPS OPHTHALMIC; TOPICAL at 22:31

## 2019-08-07 RX ADMIN — Medication 650 MILLIGRAM(S): at 14:15

## 2019-08-07 RX ADMIN — PANTOPRAZOLE SODIUM 40 MILLIGRAM(S): 20 TABLET, DELAYED RELEASE ORAL at 05:32

## 2019-08-07 RX ADMIN — POLYETHYLENE GLYCOL 3350 17 GRAM(S): 17 POWDER, FOR SOLUTION ORAL at 12:07

## 2019-08-07 RX ADMIN — TAMSULOSIN HYDROCHLORIDE 0.8 MILLIGRAM(S): 0.4 CAPSULE ORAL at 22:31

## 2019-08-07 RX ADMIN — LISINOPRIL 40 MILLIGRAM(S): 2.5 TABLET ORAL at 05:32

## 2019-08-07 RX ADMIN — Medication 650 MILLIGRAM(S): at 15:00

## 2019-08-07 RX ADMIN — OXYCODONE AND ACETAMINOPHEN 1 TABLET(S): 5; 325 TABLET ORAL at 05:38

## 2019-08-07 RX ADMIN — OXYCODONE AND ACETAMINOPHEN 1 TABLET(S): 5; 325 TABLET ORAL at 06:38

## 2019-08-07 RX ADMIN — CEFTRIAXONE 100 MILLIGRAM(S): 500 INJECTION, POWDER, FOR SOLUTION INTRAMUSCULAR; INTRAVENOUS at 17:01

## 2019-08-07 NOTE — PROGRESS NOTE ADULT - ASSESSMENT
79 yo M with h/o dementia, here with agitation, functional decline. Recently extubated after upperGIB-s/p EGD with gastric clipping. Course c/b aspiration PNA, in which patient completed abx.  Also with urinary retention.  ID, URO, GI, Rheumatology, and palliative following.   D/C planning for LUPE when stable.    A/P:    1. Fevers, unknown etiology  -pt completed course of antibiotics for Asp PNA and repeat CXR with improvement of right infiltrate  - restarted on empiric antibx 8/4 x 3 doses (completed) due to persistent fevers; afebrile overnight   -per ID pt with possible Gout +/- infection in left knee; Rheumatology tapped knee; await cytology  -continue to follow cbc, monitor fever curve (noted afebrile overnight)  -Blood cultures: gram positive Cocci Streptococcus SP growth x 1 bottle: likely contaminant as per ID.    2.  s/p Upper GI Bleed; gastric ulcer found on EGD  -Hgb stable, 9.4  -c/w  PO protonix BID  -GI signed off--will follow up with Dr Thomas in 2 weeks    3.  Urinary Retention:  -maintain redmond placement.  -Appreciate  consult; c/w flomax 0.8mg, avoid any anticholenergics, delayed voiding trials until after rehab.    4.  OLVIN:  -continue to follow Creat. (currently 1.33)   (Cr 1.15 -1.18- 1.14 - 1.12)  -avoid nephrotoxic meds    5.  HTN: (stable 126/60)  C/W norvasc and lisinopril    6.  Left knee effusion with DJD, possible Gout flare:  X ray neg for Fx; has effusion  Rheumatology had left knee tapped and currently pending cytology.  -Pain mgmt with TYL for mild; Oxycodone for moderate pain  -continue to monitor CBC.    7.  Dementia; Alzheimers type; progressive and severe:  -continue Supportive care, Neurology consult appreciated

## 2019-08-07 NOTE — PROGRESS NOTE ADULT - SUBJECTIVE AND OBJECTIVE BOX
Patient is a 78y old  Male who presents with a chief complaint of Worsening agitation and difficulty ambulating (06 Aug 2019 17:28)      Interval event: uneventful last night. no fevers overnight  Patient seen and examined at bedside.    ROS: Denies any pain or SOB, poor historian d/t dementia.    ALLERGIES:  No Known Allergies    MEDICATIONS  (STANDING):  amLODIPine   Tablet 5 milliGRAM(s) Oral daily  cefTRIAXone   IVPB 1000 milliGRAM(s) IV Intermittent every 24 hours  cefTRIAXone   IVPB      dextrose 5%. 1000 milliLiter(s) (50 mL/Hr) IV Continuous <Continuous>  dextrose 50% Injectable 12.5 Gram(s) IV Push once  dextrose 50% Injectable 25 Gram(s) IV Push once  dextrose 50% Injectable 25 Gram(s) IV Push once  insulin lispro (HumaLOG) corrective regimen sliding scale   SubCutaneous Before meals and at bedtime  latanoprost 0.005% Ophthalmic Solution 1 Drop(s) Both EYES at bedtime  lisinopril 40 milliGRAM(s) Oral daily  pantoprazole    Tablet 40 milliGRAM(s) Oral two times a day  polyethylene glycol 3350 17 Gram(s) Oral daily  tamsulosin 0.8 milliGRAM(s) Oral at bedtime    MEDICATIONS  (PRN):  acetaminophen   Tablet .. 650 milliGRAM(s) Oral every 6 hours PRN Mild Pain (1 - 3)  acetaminophen   Tablet .. 650 milliGRAM(s) Oral every 6 hours PRN Temp greater or equal to 38C (100.4F)  ALPRAZolam 0.25 milliGRAM(s) Oral every 12 hours PRN anxiety  ondansetron Injectable 4 milliGRAM(s) IV Push every 6 hours PRN Nausea and/or Vomiting  oxyCODONE    5 mG/acetaminophen 325 mG 1 Tablet(s) Oral every 6 hours PRN Moderate Pain (4 - 6)  sodium chloride 0.9% lock flush 10 milliLiter(s) IV Push every 1 hour PRN Pre/post blood products, medications, blood draw, and to maintain line patency    Vital Signs Last 24 Hrs  T(F): 98.4 (07 Aug 2019 05:29), Max: 98.8 (06 Aug 2019 21:28)  HR: 95 (07 Aug 2019 05:29) (95 - 122)  BP: 126/60 (07 Aug 2019 05:29) (126/60 - 145/67)  RR: 15 (07 Aug 2019 05:29) (15 - 16)  SpO2: 96% (07 Aug 2019 05:29) (96% - 97%)  I&O's Summary    06 Aug 2019 07:01  -  07 Aug 2019 07:00  --------------------------------------------------------  IN: 1840 mL / OUT: 1500 mL / NET: 340 mL    07 Aug 2019 07:01  -  07 Aug 2019 10:41  --------------------------------------------------------  IN: 360 mL / OUT: 200 mL / NET: 160 mL    PHYSICAL EXAM:  General: NAD, A/O x 1  ENT: MMM. no ear/nose drainage  Neck: Supple, No JVD  Lungs: Clear to auscultation bilaterally, no wheezing, rales, rhonchi  Cardio: RRR, S1/S2, No murmurs, S3, S4  Abdomen: Soft, Nontender, Nondistended x 4quads; Bowel sounds present  Extremities: No calf tenderness, No pitting edema, improvement to L knee with reduced edema noted. decreased tenderness to LLE.    LABS:                        9.4    10.39 )-----------( 349      ( 07 Aug 2019 07:38 )             29.2     08-07    141  |  106  |  18  ----------------------------<  153  3.9   |  24  |  1.33    Ca    8.9      07 Aug 2019 07:38      eGFR if Non African American: 51 mL/min/1.73M2 (08-07-19 @ 07:38)  eGFR if African American: 60 mL/min/1.73M2 (08-07-19 @ 07:38)    Lactate, Blood: 0.5 mmol/L (08-05 @ 06:05)    POCT Blood Glucose.: 145 mg/dL (07 Aug 2019 07:40)  POCT Blood Glucose.: 104 mg/dL (06 Aug 2019 21:32)  POCT Blood Glucose.: 143 mg/dL (06 Aug 2019 16:36)  POCT Blood Glucose.: 137 mg/dL (06 Aug 2019 12:28)    07-28 ArbagzvtacP8C 5.7    Culture - Body Fluid with Gram Stain (collected 06 Aug 2019 09:00)  Source: .Body Fluid Synovial Fluid  Gram Stain (06 Aug 2019 15:11):    polymorphonuclear leukocytes seen per low power field    No organisms seen per oil power field    by cytocentrifuge    Culture - Blood (collected 02 Aug 2019 20:40)  Source: .Blood Blood-Peripheral  Preliminary Report (04 Aug 2019 02:01):    No growth to date.    Culture - Blood (collected 02 Aug 2019 20:30)  Source: .Blood Blood-Peripheral  Preliminary Report (04 Aug 2019 02:01):    No growth to date.    RADIOLOGY & ADDITIONAL TESTS:  < from: Xray Chest 1 View-PORTABLE IMMEDIATE (08.02.19 @ 19:13) >  EXAM:  XR CHEST PORTABLE IMMED 1V    PROCEDURE DATE:  08/02/2019    INTERPRETATION:  Single view chest  comparison 07/31/19  Right upper lung opacity has partially improved with mild residual. There   is a persistently poor inspiratory result without new consolidation,   pneumothorax or layering effusion.  < end of copied text >    < from: US Duplex Venous Lower Ext Complete, Bilateral (08.02.19 @ 13:58) >  EXAM:  US DPLX LWR EXT VEINS COMPL BI      PROCEDURE DATE:  08/02/2019    INTERPRETATION:  CLINICAL INFORMATION: Bilateral lower extreme pain and   swelling of unknown severity or duration. Evaluate for DVT bilaterally.   No additional information.  COMPARISON: 3/13/2013  TECHNIQUE: Duplex sonography of the BILATERAL LOWER extremity veins with   color and spectral Doppler, with and without compression.    IMPRESSION:   No evidence of deep venous thrombosis in either lower extremity.  < end of copied text >    < from: Xray Ankle Complete 3 Views, Left (07.31.19 @ 10:02) >  EXAM:  ANKLE LEFT (MINIMUM 3 VIEWS)    PROCEDURE DATE:  07/31/2019    INTERPRETATION:  Left ankle. Patient has local pain. 3 views obtained.  IMPRESSION: Soft tissue swelling.  < end of copied text >

## 2019-08-07 NOTE — CHART NOTE - NSCHARTNOTEFT_GEN_A_CORE
NUTRITION FOLLOW UP    SOURCE: Patient [)   Family [ ]     other [X ]    DIET: DYSPHAGIA 2 W/ THIN LIQUIDS w/ 1 magic cup BID    PATIENT REPORT[ ] nausea  [ ] vomiting [ ] diarrhea [ ] constipation  [ ]chewing problems [ X] swallowing issues  [ ] other: no GI distress    PO INTAKE:  < 50% [ ]   50-75%  [ X]   %  []  other :    SOURCE: for PO intake [X] Patient [ ] family [ ] chart [ ] staff [ ] other    ENTERAL/PARENTERAL NUTRITION: n/a    CURRENT WEIGHT: Weight (kg): 72.3 (08-05 @ 05:00)    PERTINENT LABS:  Date: 07 Aug 2019 07:38  Hemoglobin 9.4    Hematocrit 29.2     Date: 08-06  Sodium 142  Potassium 3.5  Glucose Serum 152<H>  BUN 14      Creatinine    ACCUCHECK  POCT Blood Glucose.: 145 mg/dL (07 Aug 2019 07:40)  POCT Blood Glucose.: 104 mg/dL (06 Aug 2019 21:32)  POCT Blood Glucose.: 143 mg/dL (06 Aug 2019 16:36)  POCT Blood Glucose.: 137 mg/dL (06 Aug 2019 12:28)      SKIN: intact, last BM was 8/5, edema noted in left knee    ESTIMATED NEEDS:   [X] no change since previous assessment  [ ] recalculated:     PREVIOUS NUTRITION DIAGNOSIS: addressed    NUTRITION DIAGNOSIS is   [X] ongoing    NEW NUTRITION DIAGNOSIS: [X] not applicable    MONITORING AND EVALUATION:   Current diet order is appropriate and is well tolerated, but will monitor for any changes that may be needed    [X] PO intake [X] Tolerance to diet prescription [X] weights [X] follow up per protocol    NUTRITION RECOMMENDATIONS:    RD remains available JOELLE Powers RD CDE

## 2019-08-07 NOTE — PROGRESS NOTE ADULT - ASSESSMENT
79 yo M, dementia, here with agitation, functional decline  Coffee ground emesis, s/p EGD- clipped gastric ulcer  Followed by resp failure, brief intubation  Low grade fever   Minimal leukocytosis  Sputum normal nghia only  Alpha Strep in single Bld Cx a contaminant  CXR with evolution of R infiltrate, confirming clinical suspicion of aspiration pneumonia  Marrero placed, failed trial of void  CTX restarted 8/4, stopped 8/7  Given left knee effusion and prior ankle swelling last week, ? if fevers could reflect crystal induced arthritis- s/p arthrocentesis earlier- fluid gram stain negative, crystals seen  Respiratory status remains improved    Plan:  Endorse stopping antibiotics  Recent fever may reflect crystal mediated arthritis  Suspect pneumonia has been adequately treated

## 2019-08-07 NOTE — PROGRESS NOTE ADULT - SUBJECTIVE AND OBJECTIVE BOX
Follow-up Pall Progress Note    Lalo Perry MD  [phone]    No new respiratory events overnight.  Denies SOB/CP.     Medications:  Vital Signs Last 24 Hrs  T(C): 36.9 (07 Aug 2019 05:29), Max: 37.1 (06 Aug 2019 21:28)  T(F): 98.4 (07 Aug 2019 05:29), Max: 98.8 (06 Aug 2019 21:28)  HR: 95 (07 Aug 2019 05:29) (95 - 122)  BP: 126/60 (07 Aug 2019 05:29) (126/60 - 145/67)  BP(mean): --  RR: 15 (07 Aug 2019 05:29) (15 - 16)  SpO2: 96% (07 Aug 2019 05:29) (96% - 97%)          08-06 @ 07:01  -  08-07 @ 07:00  --------------------------------------------------------  IN: 1840 mL / OUT: 1500 mL / NET: 340 mL        LABS:                        9.4    10.39 )-----------( 349      ( 07 Aug 2019 07:38 )             29.2     08-07    141  |  106  |  18  ----------------------------<  153<H>  3.9   |  24  |  1.33<H>    Ca    8.9      07 Aug 2019 07:38                CULTURES:  Culture Results:   No growth to date. (08-02 @ 20:40)  Culture Results:   No growth to date. (08-02 @ 20:30)    Most recent blood culture -- 08-06 @ 09:00   -- -- .Body Fluid Synovial Fluid 08-06 @ 09:00  Most recent blood culture -- 08-02 @ 20:40   -- -- .Blood Blood-Peripheral 08-02 @ 20:40  Most recent blood culture -- 08-02 @ 20:30   -- -- .Blood Blood-Peripheral 08-02 @ 20:30      Physical Examination:  PULM: Clear to auscultation bilaterally, no significant sputum production  CVS: Regular rate and rhythm, no murmurs, rubs, or gallops  ABD: Soft, non-tender  EXT:  No clubbing, cyanosis, or edema

## 2019-08-07 NOTE — CHART NOTE - NSCHARTNOTESELECT_GEN_ALL_CORE
Event Note/Urinary Retention
Event Note
Event Note
Nutrition Services
Off Service Note

## 2019-08-07 NOTE — PROGRESS NOTE ADULT - SUBJECTIVE AND OBJECTIVE BOX
CC: f/u for fever    Patient reports: he is comfortable, no fever x 48 hours.S/P left knee tap with crystals seen    REVIEW OF SYSTEMS:  All other review of systems negative (Comprehensive ROS)    Antimicrobials Day #  :s/p limited CTX    Other Medications Reviewed    T(F): 98.8 (08-07-19 @ 16:26), Max: 98.8 (08-06-19 @ 21:28)  HR: 110 (08-07-19 @ 16:26)  BP: 123/61 (08-07-19 @ 16:26)  RR: 14 (08-07-19 @ 16:26)  SpO2: 96% (08-07-19 @ 16:26)  Wt(kg): --    PHYSICAL EXAM:  General: alert, no acute distress, eating dinner  Eyes:  anicteric, no conjunctival injection, no discharge  Oropharynx: no lesions or injection 	  Neck: supple, without adenopathy  Lungs: clear to auscultation  Heart: regular rate and rhythm; no murmur, rubs or gallops  Abdomen: soft, nondistended, nontender, without mass or organomegaly  Skin: no lesions  Extremities: no clubbing, cyanosis, or edema  Neurologic: alert, baseline confusion, moves all extremities   redmond with clear urine  Left knee with decreased effusion    LAB RESULTS:                        9.4    10.39 )-----------( 349      ( 07 Aug 2019 07:38 )             29.2     08-07    141  |  106  |  18  ----------------------------<  153<H>  3.9   |  24  |  1.33<H>    Ca    8.9      07 Aug 2019 07:38          MICROBIOLOGY:  RECENT CULTURES:  08-06 @ 09:00 .Body Fluid Synovial Fluid     No growth    polymorphonuclear leukocytes seen per low power field  No organisms seen per oil power field  by cytocentrifuge    08-02 @ 20:40 .Blood Blood-Peripheral     No growth to date.      08-02 @ 20:30 .Blood Blood-Peripheral     No growth to date.          RADIOLOGY REVIEWED:

## 2019-08-07 NOTE — PROGRESS NOTE ADULT - ASSESSMENT
A/P: 79 yo M with PMHx of Alzheimer Dementia, gout, HTN brought in by wife from home for declining mental status and difficulty ambulating x 1 week.   Pt has known dementia and his wife has noticed that he had difficulty ambulating with swelling of both feet, decreased PO intake and worsening agitation.  Alzheimer's dementia with behavioral disturbance.  CT head negative for acute pathology, worsening agitation likely   r/t progressive dementia.   s/p Acute respiratory failure w/ intubation  Due to  Aspiration Pneumonitis.   Urinary retention, seen by urology keep redmond for now, added  flomax, repeat voiding trial after rehab    Upper GIB with Acute blood loss anemia s/p EGD s/p Clips for visible vessel ulcer, currently resolved, on PPI , f/b GI    palliative: Pt is more awake, alert today. Remains confused, speaking mostly [place]. Looks comfortable at present .  To be seen by ortho today for L knee swelling.   wife not at bedside currently, will cont to follow w/ med team . GOC  full code and likely lionel.    Plan to review MOLST form w/ wife when receptive..     Wife not at bedside today

## 2019-08-08 ENCOUNTER — TRANSCRIPTION ENCOUNTER (OUTPATIENT)
Age: 79
End: 2019-08-08

## 2019-08-08 LAB
ANION GAP SERPL CALC-SCNC: 10 MMOL/L — SIGNIFICANT CHANGE UP (ref 5–17)
BUN SERPL-MCNC: 18 MG/DL — SIGNIFICANT CHANGE UP (ref 7–23)
CALCIUM SERPL-MCNC: 9.2 MG/DL — SIGNIFICANT CHANGE UP (ref 8.4–10.5)
CHLORIDE SERPL-SCNC: 105 MMOL/L — SIGNIFICANT CHANGE UP (ref 96–108)
CO2 SERPL-SCNC: 27 MMOL/L — SIGNIFICANT CHANGE UP (ref 22–31)
CREAT SERPL-MCNC: 1.32 MG/DL — HIGH (ref 0.5–1.3)
CULTURE RESULTS: SIGNIFICANT CHANGE UP
CULTURE RESULTS: SIGNIFICANT CHANGE UP
GLUCOSE BLDC GLUCOMTR-MCNC: 134 MG/DL — HIGH (ref 70–99)
GLUCOSE BLDC GLUCOMTR-MCNC: 140 MG/DL — HIGH (ref 70–99)
GLUCOSE BLDC GLUCOMTR-MCNC: 190 MG/DL — HIGH (ref 70–99)
GLUCOSE BLDC GLUCOMTR-MCNC: 215 MG/DL — HIGH (ref 70–99)
GLUCOSE SERPL-MCNC: 145 MG/DL — HIGH (ref 70–99)
HCT VFR BLD CALC: 31.2 % — LOW (ref 39–50)
HGB BLD-MCNC: 9.9 G/DL — LOW (ref 13–17)
MCHC RBC-ENTMCNC: 28.6 PG — SIGNIFICANT CHANGE UP (ref 27–34)
MCHC RBC-ENTMCNC: 31.7 GM/DL — LOW (ref 32–36)
MCV RBC AUTO: 90.2 FL — SIGNIFICANT CHANGE UP (ref 80–100)
NRBC # BLD: 0 /100 WBCS — SIGNIFICANT CHANGE UP (ref 0–0)
PLATELET # BLD AUTO: 382 K/UL — SIGNIFICANT CHANGE UP (ref 150–400)
POTASSIUM SERPL-MCNC: 4.1 MMOL/L — SIGNIFICANT CHANGE UP (ref 3.5–5.3)
POTASSIUM SERPL-SCNC: 4.1 MMOL/L — SIGNIFICANT CHANGE UP (ref 3.5–5.3)
RBC # BLD: 3.46 M/UL — LOW (ref 4.2–5.8)
RBC # FLD: 13 % — SIGNIFICANT CHANGE UP (ref 10.3–14.5)
SODIUM SERPL-SCNC: 142 MMOL/L — SIGNIFICANT CHANGE UP (ref 135–145)
SPECIMEN SOURCE: SIGNIFICANT CHANGE UP
SPECIMEN SOURCE: SIGNIFICANT CHANGE UP
WBC # BLD: 10.81 K/UL — HIGH (ref 3.8–10.5)
WBC # FLD AUTO: 10.81 K/UL — HIGH (ref 3.8–10.5)

## 2019-08-08 PROCEDURE — 99233 SBSQ HOSP IP/OBS HIGH 50: CPT

## 2019-08-08 PROCEDURE — 99232 SBSQ HOSP IP/OBS MODERATE 35: CPT

## 2019-08-08 PROCEDURE — 93010 ELECTROCARDIOGRAM REPORT: CPT

## 2019-08-08 RX ORDER — SIMETHICONE 80 MG/1
1 TABLET, CHEWABLE ORAL
Qty: 0 | Refills: 0 | DISCHARGE

## 2019-08-08 RX ORDER — AMLODIPINE BESYLATE 2.5 MG/1
1 TABLET ORAL
Qty: 0 | Refills: 0 | DISCHARGE
Start: 2019-08-08

## 2019-08-08 RX ORDER — METOPROLOL TARTRATE 50 MG
25 TABLET ORAL
Refills: 0 | Status: DISCONTINUED | OUTPATIENT
Start: 2019-08-08 | End: 2019-08-09

## 2019-08-08 RX ORDER — ALPRAZOLAM 0.25 MG
0 TABLET ORAL
Qty: 0 | Refills: 0 | DISCHARGE

## 2019-08-08 RX ORDER — TAMSULOSIN HYDROCHLORIDE 0.4 MG/1
2 CAPSULE ORAL
Qty: 0 | Refills: 0 | DISCHARGE
Start: 2019-08-08

## 2019-08-08 RX ORDER — ALPRAZOLAM 0.25 MG
1 TABLET ORAL
Qty: 0 | Refills: 0 | DISCHARGE
Start: 2019-08-08

## 2019-08-08 RX ORDER — TRAZODONE HCL 50 MG
0 TABLET ORAL
Qty: 0 | Refills: 0 | DISCHARGE

## 2019-08-08 RX ORDER — PANTOPRAZOLE SODIUM 20 MG/1
1 TABLET, DELAYED RELEASE ORAL
Qty: 0 | Refills: 0 | DISCHARGE
Start: 2019-08-08

## 2019-08-08 RX ORDER — ZOLPIDEM TARTRATE 10 MG/1
1 TABLET ORAL
Qty: 0 | Refills: 0 | DISCHARGE

## 2019-08-08 RX ORDER — POLYETHYLENE GLYCOL 3350 17 G/17G
17 POWDER, FOR SOLUTION ORAL
Qty: 0 | Refills: 0 | DISCHARGE
Start: 2019-08-08

## 2019-08-08 RX ORDER — ACETAMINOPHEN 500 MG
2 TABLET ORAL
Qty: 0 | Refills: 0 | DISCHARGE
Start: 2019-08-08

## 2019-08-08 RX ADMIN — Medication 10 MILLIGRAM(S): at 12:34

## 2019-08-08 RX ADMIN — Medication 15 MILLIGRAM(S): at 08:42

## 2019-08-08 RX ADMIN — Medication 1: at 12:34

## 2019-08-08 RX ADMIN — Medication 15 MILLIGRAM(S): at 18:26

## 2019-08-08 RX ADMIN — PANTOPRAZOLE SODIUM 40 MILLIGRAM(S): 20 TABLET, DELAYED RELEASE ORAL at 06:35

## 2019-08-08 RX ADMIN — PANTOPRAZOLE SODIUM 40 MILLIGRAM(S): 20 TABLET, DELAYED RELEASE ORAL at 18:25

## 2019-08-08 RX ADMIN — POLYETHYLENE GLYCOL 3350 17 GRAM(S): 17 POWDER, FOR SOLUTION ORAL at 12:33

## 2019-08-08 RX ADMIN — AMLODIPINE BESYLATE 5 MILLIGRAM(S): 2.5 TABLET ORAL at 06:35

## 2019-08-08 RX ADMIN — TAMSULOSIN HYDROCHLORIDE 0.8 MILLIGRAM(S): 0.4 CAPSULE ORAL at 21:31

## 2019-08-08 RX ADMIN — LATANOPROST 1 DROP(S): 0.05 SOLUTION/ DROPS OPHTHALMIC; TOPICAL at 21:32

## 2019-08-08 RX ADMIN — LISINOPRIL 40 MILLIGRAM(S): 2.5 TABLET ORAL at 06:35

## 2019-08-08 RX ADMIN — Medication 25 MILLIGRAM(S): at 18:25

## 2019-08-08 NOTE — DISCHARGE NOTE PROVIDER - HOSPITAL COURSE
79 yo M with h/o Dementia, now with worsening agitation and functional decline and possibly Polypharmacy.  Pt. seen by Neurologist upon admission, adjusted medications.  CT of the brain showed no acute changes.  He had an Upper GIB during hospitalization requiring EGD with gastric clipping due to bleeding gastric ulcer.  Transferred to ICU for  Resp failure/Asp PNA after EGD.   Also with urinary retention; had redmond placed; seen by Urology.  He was also seen by ID, GI, Rheumatology, and Palliative.          Other Discharge Dx:    1.  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   OLVIN:    -continue to follow Creat.  (Cr 1.15 -1.18- 1.14 - 1.32)        3.  HTN:     -c/w norvasc and lisinopril 79 yo M with h/o Dementia, now with worsening agitation and functional decline and possibly Polypharmacy.  Pt. seen by Neurologist upon admission, adjusted medications.  CT of the brain showed no acute changes.  He had an Upper GIB during hospitalization requiring EGD with gastric clipping due to bleeding gastric ulcer.  Transferred to ICU for  Resp failure/Asp PNA after EGD.   Also with urinary retention; had redmond placed; seen by Urology.  He was also seen by ID, GI, Rheumatology, and Palliative.          Other Discharge Dx:    1.  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   OLVIN:    -continue to follow Creat.  (Cr 1.15 -1.18- 1.14 - 1.32)        3.  HTN:     -c/w norvasc and lisinopril        Dr. Reyes notified of discharge into his care at [facility]

## 2019-08-08 NOTE — PROGRESS NOTE ADULT - PROBLEM SELECTOR PROBLEM 1
GIB (gastrointestinal bleeding)
GIB (gastrointestinal bleeding)
Acute respiratory failure with hypoxia
GIB (gastrointestinal bleeding)
GIB (gastrointestinal bleeding)
Urinary retention
GIB (gastrointestinal bleeding)
Alzheimer's dementia with behavioral disturbance
Aspiration pneumonia
Alzheimer's dementia with behavioral disturbance

## 2019-08-08 NOTE — PROGRESS NOTE ADULT - SUBJECTIVE AND OBJECTIVE BOX
Progress: no events overnight, pt  now on steroid taper for  polyarticular gout     Present Symptoms:   Dyspnea: no  Nausea/Vomiting: no  Anxiety:  no  Depressed Mood:   Fatigue: yes  Loss of appetite: no  Pain:       no           location:   Review of Systems:  Unable to obtain due to poor mentation]    MEDICATIONS  (STANDING):  amLODIPine   Tablet 5 milliGRAM(s) Oral daily  dextrose 5%. 1000 milliLiter(s) (50 mL/Hr) IV Continuous <Continuous>  dextrose 50% Injectable 12.5 Gram(s) IV Push once  dextrose 50% Injectable 25 Gram(s) IV Push once  dextrose 50% Injectable 25 Gram(s) IV Push once  insulin lispro (HumaLOG) corrective regimen sliding scale   SubCutaneous Before meals and at bedtime  latanoprost 0.005% Ophthalmic Solution 1 Drop(s) Both EYES at bedtime  lisinopril 40 milliGRAM(s) Oral daily  methylPREDNISolone sodium succinate Injectable 15 milliGRAM(s) IV Push every 12 hours  pantoprazole    Tablet 40 milliGRAM(s) Oral two times a day  polyethylene glycol 3350 17 Gram(s) Oral daily  tamsulosin 0.8 milliGRAM(s) Oral at bedtime    MEDICATIONS  (PRN):  acetaminophen   Tablet .. 650 milliGRAM(s) Oral every 6 hours PRN Mild Pain (1 - 3)  acetaminophen   Tablet .. 650 milliGRAM(s) Oral every 6 hours PRN Temp greater or equal to 38C (100.4F)  ALPRAZolam 0.25 milliGRAM(s) Oral every 12 hours PRN anxiety  bisacodyl Suppository 10 milliGRAM(s) Rectal daily PRN Constipation  ondansetron Injectable 4 milliGRAM(s) IV Push every 6 hours PRN Nausea and/or Vomiting  oxyCODONE    5 mG/acetaminophen 325 mG 1 Tablet(s) Oral every 6 hours PRN Moderate Pain (4 - 6)  sodium chloride 0.9% lock flush 10 milliLiter(s) IV Push every 1 hour PRN Pre/post blood products, medications, blood draw, and to maintain line patency      PHYSICAL EXAM:  Vital Signs Last 24 Hrs  T(C): 37.1 (08 Aug 2019 15:18), Max: 37.2 (07 Aug 2019 22:20)  T(F): 98.8 (08 Aug 2019 15:18), Max: 98.9 (07 Aug 2019 22:20)  HR: 133 (08 Aug 2019 15:18) (110 - 133)  BP: 171/86 (08 Aug 2019 15:18) (123/75 - 171/86)  BP(mean): --  RR: 15 (08 Aug 2019 15:18) (14 - 15)  SpO2: 94% (08 Aug 2019 15:18) (94% - 97%)  General: alert  oriented x ____      HEENT: normal    Lungs: comfortable   CV: normal    GI: normal    : normal    Musculoskeletal: normal   Skin: normal    Neuro: no deficits   Oral intake ability:  oral feeding  Diet: NPO,     LABS:                          9.9    10.81 )-----------( 382      ( 08 Aug 2019 07:25 )             31.2     08-08    142  |  105  |  18  ----------------------------<  145<H>  4.1   |  27  |  1.32<H>    Ca    9.2      08 Aug 2019 07:25          RADIOLOGY & ADDITIONAL STUDIES:    ADVANCE DIRECTIVES: Full code   Advanced Care Planning discussion total time spent:

## 2019-08-08 NOTE — PROGRESS NOTE ADULT - SUBJECTIVE AND OBJECTIVE BOX
Patient seen and examined. Improvement in pain in L knee but still with some pain with movement and tenderness to palpation over b/l ankles and dorsum of feet. No other joints involved, no reaccumulation of knee effusion. No further fevers. Still remains with poor mobility 2/2 pain.     PE: Vitals reviewed and stable  Gen - awake, alert   CVS - s1/s2, rrr  Lungs - crackles at b/l bases, cta b/l otherwise  Abd - soft, nt/nd, +bs  Ext - L knee with reduced suprapatellar effusion, mild warmth, and trace erythema over later aspect of joint, TTP over medial joint line with bony hypertrophy. + continued mild warmth over dorsum of b/l feet, TTP and mild fullness over ankles but no overt synovitis, no podagra. No tophi or other appreciable synovitis.   Skin - no rash     Synovial fluid -- inflammatory cell count, + MSU crystals, gram stain/cx NGTD

## 2019-08-08 NOTE — PROGRESS NOTE ADULT - ASSESSMENT
A/P78 yo M with PMHx of Alzheimer Dementia, gout, HTN brought in by wife from home for declining mental status and difficulty ambulating x 1 week.   Pt has known dementia and his wife has noticed that he had difficulty ambulating with swelling of both feet, decreased PO intake and worsening agitation.  Alzheimer's dementia with behavioral disturbance.  CT head negative for acute pathology, worsening agitation likely   r/t progressive dementia.   s/p Acute respiratory failure w/ intubation  Due to  Aspiration Pneumonitis.   Urinary retention, seen by urology keep redmond for now, added  flomax, repeat voiding trial after rehab    Upper GIB with Acute blood loss anemia s/p EGD s/p Clips for visible vessel ulcer, currently resolved, on PPI     palliative: Pt is more awake, alert today. Remains confused, speaking mostly Irish. Looks comfortable at present .    pt dx w/ gout and started on steroid taper f/b ortho/rheumatology   GOC  remains full code, wife reports pt will be going to [facility] and she is not receptive to reviewing the MOLST form at this time.  Palliative no longer following case ,please re- consult if needed.

## 2019-08-08 NOTE — PROGRESS NOTE ADULT - ASSESSMENT
79 yo M admitted with b/l feet swelling/warmth/pain, mildly elevated Cr and AMS. Hospital course c/b GIB, aspiration PNA, urinary retention and now with recurrence of fevers in setting of chronic L knee effusion, initially seen on admission but with worsening pain in last few days. Pt with partially treated polyarticular gout flare in L knee, b/l feet/ankles in setting of recent infection. Fluid negative for infection, fevers resolved and low grade fevers can be due to crystalline arthritis. Remains with poor mobility 2/2 pain.     - f/u complete culture results  - recommend steroid taper for polyarticular gout --solumedrol 15mg BID today, then 15mg once tomorrow, PO prednisone taper to start on 8/10: 15mg x 3 days, 10mg x 3day, 5mg x 3 days.  - avoid NSAIDs, tylenol or topical lidoderm patch prn pain   - please have patient follow-up as outpatient for further gout management: Call [phone] for appointment, location: [address] [place] [address].

## 2019-08-08 NOTE — PROGRESS NOTE ADULT - SUBJECTIVE AND OBJECTIVE BOX
Patient is a 78y old  Male who presents with a chief complaint of Worsening agitation and difficulty ambulating (08 Aug 2019 07:55)  Patient seen and examined at bedside.  No events overnight.    ALLERGIES:  No Known Allergies    MEDICATIONS  (STANDING):  amLODIPine   Tablet 5 milliGRAM(s) Oral daily  insulin lispro (HumaLOG) corrective regimen sliding scale   SubCutaneous Before meals and at bedtime  latanoprost 0.005% Ophthalmic Solution 1 Drop(s) Both EYES at bedtime  lisinopril 40 milliGRAM(s) Oral daily  methylPREDNISolone sodium succinate Injectable 15 milliGRAM(s) IV Push every 12 hours  pantoprazole    Tablet 40 milliGRAM(s) Oral two times a day  polyethylene glycol 3350 17 Gram(s) Oral daily  tamsulosin 0.8 milliGRAM(s) Oral at bedtime    MEDICATIONS  (PRN):  acetaminophen   Tablet .. 650 milliGRAM(s) Oral every 6 hours PRN Mild Pain (1 - 3)  acetaminophen   Tablet .. 650 milliGRAM(s) Oral every 6 hours PRN Temp greater or equal to 38C (100.4F)  ALPRAZolam 0.25 milliGRAM(s) Oral every 12 hours PRN anxiety  bisacodyl Suppository 10 milliGRAM(s) Rectal daily PRN Constipation  ondansetron Injectable 4 milliGRAM(s) IV Push every 6 hours PRN Nausea and/or Vomiting  oxyCODONE    5 mG/acetaminophen 325 mG 1 Tablet(s) Oral every 6 hours PRN Moderate Pain (4 - 6)  sodium chloride 0.9% lock flush 10 milliLiter(s) IV Push every 1 hour PRN Pre/post blood products, medications, blood draw, and to maintain line patency    Vital Signs Last 24 Hrs  T(F): 98.1 (08 Aug 2019 05:00), Max: 98.9 (07 Aug 2019 22:20)  HR: 120 (08 Aug 2019 05:00) (110 - 120)  BP: 143/76 (08 Aug 2019 05:00) (123/61 - 143/76)  RR: 14 (08 Aug 2019 05:00) (14 - 15)  SpO2: 96% (08 Aug 2019 05:00) (96% - 97%)  I&O's Summary    07 Aug 2019 07:01  -  08 Aug 2019 07:00  --------------------------------------------------------  IN: 890 mL / OUT: 1950 mL / NET: -1060 mL    08 Aug 2019 07:01  -  08 Aug 2019 11:12  --------------------------------------------------------  IN: 320 mL / OUT: 0 mL / NET: 320 mL      PHYSICAL EXAM:  General: NAD, AxOx0.    ENT: MMM  Neck: Supple, No JVD  Lungs: Clear to auscultation bilaterally  Cardio: RRR, S1/S2, No murmurs  Abdomen: Soft, Nontender, Nondistended; Bowel sounds present  Extremities: No calf tenderness, left knee with edema; improved, no erythema, mild warmth  Neuro: pt unable to follow commands    LABS:                        9.9    10.81 )-----------( 382      ( 08 Aug 2019 07:25 )             31.2     08-08    142  |  105  |  18  ----------------------------<  145  4.1   |  27  |  1.32    Ca    9.2      08 Aug 2019 07:25      eGFR if Non African American: 52 mL/min/1.73M2 (08-08-19 @ 07:25)  eGFR if : 61 mL/min/1.73M2 (08-08-19 @ 07:25)  POCT Blood Glucose.: 140 mg/dL (08 Aug 2019 07:17)  POCT Blood Glucose.: 133 mg/dL (07 Aug 2019 22:26)  POCT Blood Glucose.: 150 mg/dL (07 Aug 2019 16:59)  POCT Blood Glucose.: 148 mg/dL (07 Aug 2019 11:50)    07-28 [id number] 5.7        Culture - Body Fluid with Gram Stain (collected 06 Aug 2019 09:00)  Source: .Body Fluid Synovial Fluid  Gram Stain (06 Aug 2019 15:11):    polymorphonuclear leukocytes seen per low power field    No organisms seen per oil power field    by cytocentrifuge  Preliminary Report (07 Aug 2019 13:01):    No growth        RADIOLOGY & ADDITIONAL TESTS:    Care Discussed with Consultants/Other Providers: Patient is a 78y old  Male who presents with a chief complaint of Worsening agitation and difficulty ambulating (08 Aug 2019 07:55)  Patient seen and examined at bedside.  No events overnight.    ALLERGIES:  No Known Allergies    MEDICATIONS  (STANDING):  amLODIPine   Tablet 5 milliGRAM(s) Oral daily  insulin lispro (HumaLOG) corrective regimen sliding scale   SubCutaneous Before meals and at bedtime  latanoprost 0.005% Ophthalmic Solution 1 Drop(s) Both EYES at bedtime  lisinopril 40 milliGRAM(s) Oral daily  methylPREDNISolone sodium succinate Injectable 15 milliGRAM(s) IV Push every 12 hours  pantoprazole    Tablet 40 milliGRAM(s) Oral two times a day  polyethylene glycol 3350 17 Gram(s) Oral daily  tamsulosin 0.8 milliGRAM(s) Oral at bedtime    MEDICATIONS  (PRN):  acetaminophen   Tablet .. 650 milliGRAM(s) Oral every 6 hours PRN Mild Pain (1 - 3)  acetaminophen   Tablet .. 650 milliGRAM(s) Oral every 6 hours PRN Temp greater or equal to 38C (100.4F)  ALPRAZolam 0.25 milliGRAM(s) Oral every 12 hours PRN anxiety  bisacodyl Suppository 10 milliGRAM(s) Rectal daily PRN Constipation  ondansetron Injectable 4 milliGRAM(s) IV Push every 6 hours PRN Nausea and/or Vomiting  oxyCODONE    5 mG/acetaminophen 325 mG 1 Tablet(s) Oral every 6 hours PRN Moderate Pain (4 - 6)  sodium chloride 0.9% lock flush 10 milliLiter(s) IV Push every 1 hour PRN Pre/post blood products, medications, blood draw, and to maintain line patency    Vital Signs Last 24 Hrs  T(F): 98.1 (08 Aug 2019 05:00), Max: 98.9 (07 Aug 2019 22:20)  HR: 120 (08 Aug 2019 05:00) (110 - 120)  BP: 143/76 (08 Aug 2019 05:00) (123/61 - 143/76)  RR: 14 (08 Aug 2019 05:00) (14 - 15)  SpO2: 96% (08 Aug 2019 05:00) (96% - 97%)  I&O's Summary    07 Aug 2019 07:01  -  08 Aug 2019 07:00  --------------------------------------------------------  IN: 890 mL / OUT: 1950 mL / NET: -1060 mL    08 Aug 2019 07:01  -  08 Aug 2019 11:12  --------------------------------------------------------  IN: 320 mL / OUT: 0 mL / NET: 320 mL      PHYSICAL EXAM:  General: NAD, A&Ox0.    ENT: MMM  Neck: Supple, No JVD  Lungs: Clear to auscultation bilaterally  Cardio: RRR, S1/S2, No murmurs  Abdomen: Soft, Nontender, Nondistended; Bowel sounds present  Extremities: No calf tenderness, left knee with edema; improved, no erythema, mild warmth  Neuro: pt unable to follow commands    LABS:                        9.9    10.81 )-----------( 382      ( 08 Aug 2019 07:25 )             31.2     08-08    142  |  105  |  18  ----------------------------<  145  4.1   |  27  |  1.32    Ca    9.2      08 Aug 2019 07:25      eGFR if Non African American: 52 mL/min/1.73M2 (08-08-19 @ 07:25)  eGFR if : 61 mL/min/1.73M2 (08-08-19 @ 07:25)  POCT Blood Glucose.: 140 mg/dL (08 Aug 2019 07:17)  POCT Blood Glucose.: 133 mg/dL (07 Aug 2019 22:26)  POCT Blood Glucose.: 150 mg/dL (07 Aug 2019 16:59)  POCT Blood Glucose.: 148 mg/dL (07 Aug 2019 11:50)    07-28 [id number] 5.7        Culture - Body Fluid with Gram Stain (collected 06 Aug 2019 09:00)  Source: .Body Fluid Synovial Fluid  Gram Stain (06 Aug 2019 15:11):    polymorphonuclear leukocytes seen per low power field    No organisms seen per oil power field    by cytocentrifuge  Preliminary Report (07 Aug 2019 13:01):    No growth        RADIOLOGY & ADDITIONAL TESTS:    Care Discussed with Consultants/Other Providers:

## 2019-08-08 NOTE — PROGRESS NOTE ADULT - ASSESSMENT
79 yo M with h/o dementia, here with agitation, functional decline. Recently extubated after upperGIB-s/p EGD with gastric clipping. Course c/b aspiration PNA, in which patient completed abx.  Also with urinary retention.  ID, URO, GI, Rheumatology, and palliative following.   D/C planning for [facility]; per CM awaiting Insurance auth.    A/P:  1.  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  s/p Upper GI Bleed; gastric ulcer found on EGD  -Hgb stable, 9.9  -c/w  PO protonix BID  -GI signed off--will follow up with Dr Thomas in 2 weeks    3.  Urinary Retention:  -maintain redmond placement.  -Appreciate  consult; c/w flomax 0.8mg, avoid any anticholenergics, delayed voiding trials until after rehab.    4.  OLVIN:  -continue to follow Creat.  (Cr 1.15 -1.18- 1.14 - 1.32)  -avoid nephrotoxic meds    5.  HTN:   -c/w norvasc and lisinopril    7.  Dementia; Alzheimers type; progressive and severe:  -continue Supportive care, Neurology consult appreciated 77 yo M with h/o dementia, here with agitation, functional decline. Recently extubated after upperGIB-s/p EGD with gastric clipping. Course c/b aspiration PNA, in which patient completed abx.  Also with urinary retention.  ID, URO, GI, Rheumatology, and palliative following.   D/C planning for [facility]; per CM awaiting Insurance auth.    A/P:  1.  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  s/p Upper GI Bleed; gastric ulcer found on EGD  -Hgb stable, 9.9  -c/w  PO protonix BID  -GI signed off--will follow up with Dr Thomas in 2 weeks    3.  Urinary Retention:  -maintain redmond placement.  -Appreciate  consult; c/w flomax 0.8mg, avoid any anticholenergics, delayed voiding trials until after rehab.    4.  OLVIN:  -continue to follow Creat.  (Cr 1.15 -1.18- 1.14 - 1.32)  -avoid nephrotoxic meds    5.  HTN:   -c/w norvasc and lisinopril    7.  Dementia; Alzheimers type; progressive and severe:  -continue Supportive care, Neurology consult appreciated    Dispo: [facility] 8/9

## 2019-08-08 NOTE — PROGRESS NOTE ADULT - SUBJECTIVE AND OBJECTIVE BOX
Patient is a 78y old  Male who presents with a chief complaint of Worsening agitation and difficulty ambulating (07 Aug 2019 17:18)    HPI:  79 yo M with PMHx of Alzheimer Dementia, gout, HTN brought in by wife from home for declining mental status and difficulty ambulating x 1 week. History obtained from pt's wife at bedside. Pt has known dementia but over the past week, his wife has noticed that he had difficulty ambulating with swelling of both feet, decreased PO intake and worsening agitation.Pt was seen by his PCP, Dr. Adame who started treatment for gout and sent Rx for steroids but his wife has not picked up the prescription.  He has been taking Alprazolam for many years, but recently also prescribed Zolpidem at bedtime as needed for increased agitation. His wife also notices decrease in urine output, denies recent illness, falls, LOC, fevers, chills, nausea, vomiting, diarrhea (25 Jul 2019 16:55)    recurrent retention    Interval Events:  Patient seen and examined at bedside.    MEDICATIONS:  MEDICATIONS  (STANDING):  amLODIPine   Tablet 5 milliGRAM(s) Oral daily  dextrose 5%. 1000 milliLiter(s) (50 mL/Hr) IV Continuous <Continuous>  dextrose 50% Injectable 12.5 Gram(s) IV Push once  dextrose 50% Injectable 25 Gram(s) IV Push once  dextrose 50% Injectable 25 Gram(s) IV Push once  insulin lispro (HumaLOG) corrective regimen sliding scale   SubCutaneous Before meals and at bedtime  latanoprost 0.005% Ophthalmic Solution 1 Drop(s) Both EYES at bedtime  lisinopril 40 milliGRAM(s) Oral daily  pantoprazole    Tablet 40 milliGRAM(s) Oral two times a day  polyethylene glycol 3350 17 Gram(s) Oral daily  tamsulosin 0.8 milliGRAM(s) Oral at bedtime    MEDICATIONS  (PRN):  acetaminophen   Tablet .. 650 milliGRAM(s) Oral every 6 hours PRN Mild Pain (1 - 3)  acetaminophen   Tablet .. 650 milliGRAM(s) Oral every 6 hours PRN Temp greater or equal to 38C (100.4F)  ALPRAZolam 0.25 milliGRAM(s) Oral every 12 hours PRN anxiety  ondansetron Injectable 4 milliGRAM(s) IV Push every 6 hours PRN Nausea and/or Vomiting  oxyCODONE    5 mG/acetaminophen 325 mG 1 Tablet(s) Oral every 6 hours PRN Moderate Pain (4 - 6)  sodium chloride 0.9% lock flush 10 milliLiter(s) IV Push every 1 hour PRN Pre/post blood products, medications, blood draw, and to maintain line patency      Allergies    No Known Allergies    Intolerances        T(C): 36.7 (08-08-19 @ 05:00), Max: 37.2 (08-07-19 @ 22:20)  T(F): 98.1 (08-08-19 @ 05:00), Max: 98.9 (08-07-19 @ 22:20)  HR: 120 (08-08-19 @ 05:00) (95 - 122)  BP: 143/76 (08-08-19 @ 05:00) (123/61 - 145/67)  RR: 14 (08-08-19 @ 05:00) (14 - 16)  SpO2: 96% (08-08-19 @ 05:00) (96% - 97%)          08-06-19 @ 07:01  -  08-07-19 @ 07:00  --------------------------------------------------------  IN:  Total IN: 0 mL    OUT:    Indwelling Catheter - Urethral: 1500 mL  Total OUT: 1500 mL    Total NET: -1500 mL      08-07-19 @ 07:01  -  08-08-19 @ 07:00  --------------------------------------------------------  IN:  Total IN: 0 mL    OUT:    Indwelling Catheter - Urethral: 1950 mL  Total OUT: 1950 mL    Total NET: -1950 mL          LABS:      CBC Full  -  ( 07 Aug 2019 07:38 )  WBC Count : 10.39 K/uL  RBC Count : 3.33 M/uL  Hemoglobin : 9.4 g/dL  Hematocrit : 29.2 %  Platelet Count - Automated : 349 K/uL  Mean Cell Volume : 87.7 fl  Mean Cell Hemoglobin : 28.2 pg  Mean Cell Hemoglobin Concentration : 32.2 gm/dL  Auto Neutrophil # : x  Auto Lymphocyte # : x  Auto Monocyte # : x  Auto Eosinophil # : x  Auto Basophil # : x  Auto Neutrophil % : x  Auto Lymphocyte % : x  Auto Monocyte % : x  Auto Eosinophil % : x  Auto Basophil % : x    08-07    141  |  106  |  18  ----------------------------<  153<H>  3.9   |  24  |  1.33<H>    Ca    8.9      07 Aug 2019 07:38                    Physical Exam    Constitutional: alert, no acute distress    Abdomen: soft, nontender, nondistended, no HSM    Genitourinary: no bladder distention, redmond yellow

## 2019-08-08 NOTE — DISCHARGE NOTE PROVIDER - CARE PROVIDER_API CALL
Peter Adame)  Family Medicine; Geriatric Medicine  [address] [place] [address]  Phone: [phone]  Fax: [phone]  Follow Up Time:     Shawanda Borjas)  Internal Medicine; Rheumatology  [address] [place] [address]  Phone: [phone]  Fax: [phone]  Follow Up Time:

## 2019-08-09 ENCOUNTER — TRANSCRIPTION ENCOUNTER (OUTPATIENT)
Age: 79
End: 2019-08-09

## 2019-08-09 VITALS
OXYGEN SATURATION: 98 % | DIASTOLIC BLOOD PRESSURE: 68 MMHG | RESPIRATION RATE: 14 BRPM | TEMPERATURE: 98 F | HEART RATE: 81 BPM | SYSTOLIC BLOOD PRESSURE: 130 MMHG

## 2019-08-09 PROCEDURE — 80053 COMPREHEN METABOLIC PANEL: CPT

## 2019-08-09 PROCEDURE — 83735 ASSAY OF MAGNESIUM: CPT

## 2019-08-09 PROCEDURE — 74019 RADEX ABDOMEN 2 VIEWS: CPT

## 2019-08-09 PROCEDURE — 97166 OT EVAL MOD COMPLEX 45 MIN: CPT

## 2019-08-09 PROCEDURE — 92610 EVALUATE SWALLOWING FUNCTION: CPT

## 2019-08-09 PROCEDURE — 70450 CT HEAD/BRAIN W/O DYE: CPT

## 2019-08-09 PROCEDURE — 85730 THROMBOPLASTIN TIME PARTIAL: CPT

## 2019-08-09 PROCEDURE — 84443 ASSAY THYROID STIM HORMONE: CPT

## 2019-08-09 PROCEDURE — 94003 VENT MGMT INPAT SUBQ DAY: CPT

## 2019-08-09 PROCEDURE — 82553 CREATINE MB FRACTION: CPT

## 2019-08-09 PROCEDURE — 86901 BLOOD TYPING SEROLOGIC RH(D): CPT

## 2019-08-09 PROCEDURE — 86780 TREPONEMA PALLIDUM: CPT

## 2019-08-09 PROCEDURE — 84550 ASSAY OF BLOOD/URIC ACID: CPT

## 2019-08-09 PROCEDURE — 89060 EXAM SYNOVIAL FLUID CRYSTALS: CPT

## 2019-08-09 PROCEDURE — 93970 EXTREMITY STUDY: CPT

## 2019-08-09 PROCEDURE — 82550 ASSAY OF CK (CPK): CPT

## 2019-08-09 PROCEDURE — 86900 BLOOD TYPING SEROLOGIC ABO: CPT

## 2019-08-09 PROCEDURE — 83036 HEMOGLOBIN GLYCOSYLATED A1C: CPT

## 2019-08-09 PROCEDURE — 73564 X-RAY EXAM KNEE 4 OR MORE: CPT

## 2019-08-09 PROCEDURE — 89051 BODY FLUID CELL COUNT: CPT

## 2019-08-09 PROCEDURE — 51701 INSERT BLADDER CATHETER: CPT

## 2019-08-09 PROCEDURE — 92526 ORAL FUNCTION THERAPY: CPT

## 2019-08-09 PROCEDURE — C1889: CPT

## 2019-08-09 PROCEDURE — 87205 SMEAR GRAM STAIN: CPT

## 2019-08-09 PROCEDURE — 97535 SELF CARE MNGMENT TRAINING: CPT

## 2019-08-09 PROCEDURE — 97161 PT EVAL LOW COMPLEX 20 MIN: CPT

## 2019-08-09 PROCEDURE — 73610 X-RAY EXAM OF ANKLE: CPT

## 2019-08-09 PROCEDURE — 87040 BLOOD CULTURE FOR BACTERIA: CPT

## 2019-08-09 PROCEDURE — 73502 X-RAY EXAM HIP UNI 2-3 VIEWS: CPT

## 2019-08-09 PROCEDURE — 84484 ASSAY OF TROPONIN QUANT: CPT

## 2019-08-09 PROCEDURE — 82803 BLOOD GASES ANY COMBINATION: CPT

## 2019-08-09 PROCEDURE — 36415 COLL VENOUS BLD VENIPUNCTURE: CPT

## 2019-08-09 PROCEDURE — 99239 HOSP IP/OBS DSCHRG MGMT >30: CPT

## 2019-08-09 PROCEDURE — 94002 VENT MGMT INPAT INIT DAY: CPT

## 2019-08-09 PROCEDURE — 80048 BASIC METABOLIC PNL TOTAL CA: CPT

## 2019-08-09 PROCEDURE — 81001 URINALYSIS AUTO W/SCOPE: CPT

## 2019-08-09 PROCEDURE — 87075 CULTR BACTERIA EXCEPT BLOOD: CPT

## 2019-08-09 PROCEDURE — 84100 ASSAY OF PHOSPHORUS: CPT

## 2019-08-09 PROCEDURE — 93306 TTE W/DOPPLER COMPLETE: CPT

## 2019-08-09 PROCEDURE — 85610 PROTHROMBIN TIME: CPT

## 2019-08-09 PROCEDURE — 96360 HYDRATION IV INFUSION INIT: CPT | Mod: XU

## 2019-08-09 PROCEDURE — 97530 THERAPEUTIC ACTIVITIES: CPT

## 2019-08-09 PROCEDURE — 99285 EMERGENCY DEPT VISIT HI MDM: CPT | Mod: 25

## 2019-08-09 PROCEDURE — 82607 VITAMIN B-12: CPT

## 2019-08-09 PROCEDURE — 83605 ASSAY OF LACTIC ACID: CPT

## 2019-08-09 PROCEDURE — 71045 X-RAY EXAM CHEST 1 VIEW: CPT

## 2019-08-09 PROCEDURE — 86850 RBC ANTIBODY SCREEN: CPT

## 2019-08-09 PROCEDURE — 87150 DNA/RNA AMPLIFIED PROBE: CPT

## 2019-08-09 PROCEDURE — 85027 COMPLETE CBC AUTOMATED: CPT

## 2019-08-09 PROCEDURE — 93005 ELECTROCARDIOGRAM TRACING: CPT

## 2019-08-09 PROCEDURE — 87086 URINE CULTURE/COLONY COUNT: CPT

## 2019-08-09 PROCEDURE — 36600 WITHDRAWAL OF ARTERIAL BLOOD: CPT

## 2019-08-09 PROCEDURE — 82746 ASSAY OF FOLIC ACID SERUM: CPT

## 2019-08-09 PROCEDURE — 82962 GLUCOSE BLOOD TEST: CPT

## 2019-08-09 PROCEDURE — 87070 CULTURE OTHR SPECIMN AEROBIC: CPT

## 2019-08-09 RX ORDER — METOPROLOL TARTRATE 50 MG
1 TABLET ORAL
Qty: 0 | Refills: 0 | DISCHARGE
Start: 2019-08-09

## 2019-08-09 RX ADMIN — Medication 25 MILLIGRAM(S): at 06:47

## 2019-08-09 RX ADMIN — Medication 0.25 MILLIGRAM(S): at 01:14

## 2019-08-09 RX ADMIN — LISINOPRIL 40 MILLIGRAM(S): 2.5 TABLET ORAL at 06:36

## 2019-08-09 RX ADMIN — PANTOPRAZOLE SODIUM 40 MILLIGRAM(S): 20 TABLET, DELAYED RELEASE ORAL at 06:36

## 2019-08-09 RX ADMIN — Medication 15 MILLIGRAM(S): at 10:53

## 2019-08-09 NOTE — PROGRESS NOTE ADULT - ASSESSMENT
77 yo M with h/o dementia, here with agitation, functional decline. Recently extubated after upperGIB-s/p EGD with gastric clipping. Course c/b aspiration PNA, in which patient completed abx.  Also with urinary retention; redmond placed.  ID, URO, GI, Rheumatology, and palliative following.   D/C planning for [facility]; per CM awaiting Insurance auth.    A/P:  1.  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  s/p Upper GI Bleed; gastric ulcer found on EGD  -Hgb stable, 9.9  -c/w  PO protonix BID  -GI signed off--will follow up with Dr Thomas in 2 weeks    3.  Urinary Retention:  -maintain redmond placement.  -Appreciate  consult; c/w flomax 0.8mg, avoid any anticholenergics, delayed voiding trials until after rehab.    4.  OLVIN:  -continue to follow Creat.  (Cr 1.15 -1.18- 1.14 - 1.32)  -avoid nephrotoxic meds    5.  HTN:   -c/w norvasc and lisinopril    7.  Dementia; Alzheimers type; progressive and severe:  -continue Supportive care, Neurology consult appreciated 79 yo M with h/o dementia, here with agitation, functional decline. Recently extubated after upperGIB-s/p EGD with gastric clipping. Course c/b aspiration PNA, in which patient completed abx.  Also with urinary retention; redmond placed.  ID, URO, GI, Rheumatology, and palliative following.   D/C planning for [facility]; Dr. Reyes was notified.  Dr. Reyes will be caring for pt. at [facility].    A/P:  1.  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  Tachycardia; multiple EKG's with Sinus tach 's.  Pt started on Betablocker; HR much improved.    3.  s/p Upper GI Bleed; gastric ulcer found on EGD  -Hgb stable, 9.9  -c/w  PO protonix BID  -GI signed off--will follow up with Dr Thomas in 2 weeks    4.  Urinary Retention:  -maintain redmond placement.  -Appreciate  consult; c/w flomax 0.8mg, avoid any anticholenergics, delayed voiding trials until after rehab.    5.  OLVIN:  -continue to follow Creat.  (Cr 1.15 -1.18- 1.14 - 1.32)  -avoid nephrotoxic meds    6.  HTN:   -c/w norvasc and lisinopril    7.  Dementia; Alzheimers type; progressive and severe:  -continue Supportive care, Neurology consult appreciated 79 yo M with h/o dementia, here with agitation, functional decline. Recently extubated after upperGIB-s/p EGD with gastric clipping. Course c/b aspiration PNA, in which patient completed abx.  Also with urinary retention; redmond placed.  ID, URO, GI, Rheumatology, and palliative following.   D/C planning for [facility]; Dr. Reyes was notified.  Dr. Reyes will be caring for pt. at [facility].    A/P:  1.  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  Tachycardia; multiple EKG's with Sinus tach 's.  Pt started on Betablocker; HR much improved.    3.  s/p Upper GI Bleed; gastric ulcer found on EGD  -Hgb stable, 9.9  -c/w  PO protonix BID  -GI signed off--will follow up with Dr Thomas in 2 weeks    4.  Urinary Retention:  -maintain redmond placement.  -Appreciate  consult; c/w flomax 0.8mg, avoid any anticholenergics, delayed voiding trials until after rehab.    5.  OLVIN:  -continue to follow Creat.  (Cr 1.15 -1.18- 1.14 - 1.32)  -avoid nephrotoxic meds    6.  HTN:   -c/w norvasc and lisinopril    7.  Dementia; Alzheimers type; progressive and severe:  -continue Supportive care, Neurology consult appreciated

## 2019-08-09 NOTE — PROGRESS NOTE ADULT - REASON FOR ADMISSION
Worsening agitation and difficulty ambulating

## 2019-08-09 NOTE — DISCHARGE NOTE NURSING/CASE MANAGEMENT/SOCIAL WORK - NSDCDPATPORTLINK_GEN_ALL_CORE
You can access the [facility] Patient Portal, offered by [facility], by registering with the following website: [email or web address]

## 2019-08-09 NOTE — PROGRESS NOTE ADULT - SUBJECTIVE AND OBJECTIVE BOX
Patient is a 78y old  Male who presents with a chief complaint of Worsening agitation and difficulty ambulating (08 Aug 2019 16:43)  Patient seen and examined at bedside.  Per wife at bedside, no complaints.  No events overnight.    ALLERGIES:  No Known Allergies    MEDICATIONS  (STANDING):  dextrose 5%. 1000 milliLiter(s) (50 mL/Hr) IV Continuous <Continuous>  dextrose 50% Injectable 12.5 Gram(s) IV Push once  dextrose 50% Injectable 25 Gram(s) IV Push once  dextrose 50% Injectable 25 Gram(s) IV Push once  insulin lispro (HumaLOG) corrective regimen sliding scale   SubCutaneous Before meals and at bedtime  latanoprost 0.005% Ophthalmic Solution 1 Drop(s) Both EYES at bedtime  lisinopril 40 milliGRAM(s) Oral daily  methylPREDNISolone sodium succinate Injectable 15 milliGRAM(s) IV Push once  metoprolol tartrate 25 milliGRAM(s) Oral two times a day  pantoprazole    Tablet 40 milliGRAM(s) Oral two times a day  polyethylene glycol 3350 17 Gram(s) Oral daily  tamsulosin 0.8 milliGRAM(s) Oral at bedtime    MEDICATIONS  (PRN):  acetaminophen   Tablet .. 650 milliGRAM(s) Oral every 6 hours PRN Mild Pain (1 - 3)  acetaminophen   Tablet .. 650 milliGRAM(s) Oral every 6 hours PRN Temp greater or equal to 38C (100.4F)  ALPRAZolam 0.25 milliGRAM(s) Oral every 12 hours PRN anxiety  bisacodyl Suppository 10 milliGRAM(s) Rectal daily PRN Constipation  ondansetron Injectable 4 milliGRAM(s) IV Push every 6 hours PRN Nausea and/or Vomiting  oxyCODONE    5 mG/acetaminophen 325 mG 1 Tablet(s) Oral every 6 hours PRN Moderate Pain (4 - 6)  sodium chloride 0.9% lock flush 10 milliLiter(s) IV Push every 1 hour PRN Pre/post blood products, medications, blood draw, and to maintain line patency    Vital Signs Last 24 Hrs  T(F): 97.3 (09 Aug 2019 07:00), Max: 98.8 (08 Aug 2019 15:18)  HR: 81 (09 Aug 2019 07:00) (81 - 133)  BP: 132/77 (09 Aug 2019 07:00) (132/77 - 171/86)  RR: 15 (09 Aug 2019 07:00) (14 - 15)  SpO2: 81% (09 Aug 2019 07:00) (81% - 95%)  I&O's Summary    08 Aug 2019 07:01  -  09 Aug 2019 07:00  --------------------------------------------------------  IN: 880 mL / OUT: 650 mL / NET: 230 mL      PHYSICAL EXAM:  General: NAD, sleepy; confused.  ENT: MMM  Neck: Supple, No JVD  Lungs: Clear to auscultation bilaterally  Cardio: RRR, S1/S2, No murmurs  Abdomen: Soft, Nontender, Nondistended; Bowel sounds present  G/U:  +EDWADRS  Extremities: No calf tenderness, left knee with less edema, no erythema, mild warmth  Neuro:  unable to follow commands    LABS:                        9.9    10.81 )-----------( 382      ( 08 Aug 2019 07:25 )             31.2     08-08    142  |  105  |  18  ----------------------------<  145  4.1   |  27  |  1.32    Ca    9.2      08 Aug 2019 07:25      eGFR if Non African American: 52 mL/min/1.73M2 (08-08-19 @ 07:25)  eGFR if : 61 mL/min/1.73M2 (08-08-19 @ 07:25)        POCT Blood Glucose.: 134 mg/dL (08 Aug 2019 21:27)  POCT Blood Glucose.: 190 mg/dL (08 Aug 2019 16:26)  POCT Blood Glucose.: 215 mg/dL (08 Aug 2019 12:05)    07-28 [id number] 5.7        Culture - Body Fluid with Gram Stain (collected 06 Aug 2019 09:00)  Source: .Body Fluid Synovial Fluid  Gram Stain (06 Aug 2019 15:11):    polymorphonuclear leukocytes seen per low power field    No organisms seen per oil power field    by cytocentrifuge  Preliminary Report (07 Aug 2019 13:01):    No growth        RADIOLOGY & ADDITIONAL TESTS:    Care Discussed with Consultants/Other Providers:

## 2019-08-09 NOTE — PROGRESS NOTE ADULT - PROVIDER SPECIALTY LIST ADULT
Critical Care
Gastroenterology
Gastroenterology
Hospitalist
Infectious Disease
MICU
Palliative Care
Rheumatology
Urology
MICU
Hospitalist
Critical Care
Infectious Disease
Gastroenterology
Hospitalist

## 2019-08-09 NOTE — PROGRESS NOTE ADULT - ATTENDING COMMENTS
I have personally seen and examined patient on the above date.  I discussed the case with Carine Avitia NP and I agree with findings and plan as detailed per note above, which I have amended where appropriate.
I have personally seen and examined patient on the above date.  I discussed the case with EVELYN Lin and I agree with findings and plan as detailed per note above, which I have amended where appropriate.    s/p knee effusion tap by rheumatology today - Gout versus pseudogout?  - will follow up with Dr. Borjas     Fevers  - still with occasional low grade temperature, perhaps secondary to gout  - monitor  - WBC remains mildly elevated
I have personally seen and examined patient on the above date.  I discussed the case with Erasmo Garcia NP and I agree with findings and plan as detailed per note above, which I have amended where appropriate.      Aspiration pneumonia with improved Chest x-ray yesterday  intermittent tachycardia and low grade fevers - unclear why  - will discuss with ID   - continue to monitor given elevated WBC, tachycardia and fevers.
I have personally seen and examined patient on the above date.  I discussed the case with Erasmo Garcia NP and I agree with findings and plan as detailed per note above, which I have amended where appropriate.      Fever curve improving - afebrile overnight - likely secondary to acute gout flare in left knee?  awaiting cytology    s/p treatment for aspiration pneumonia for 7 days then another 3 days of antibiotics  - monitor off antibiotics per ID    Dispo planning to [facility] once knee tap cytology retyurns.
I have personally seen and examined patient on the above date.  I discussed the case with Carine Avitia NP and I agree with findings and plan as detailed per note above, which I have amended where appropriate.
I have personally seen and examined patient on the above date.  I discussed the case with Erasmo Garcia NP and I agree with findings and plan as detailed per note above, which I have amended where appropriate.    Aspiration pneumonia  - completed antibiotics  - follow CBC and temperatures closely  - may need to restart antibiotics
I have personally seen and examined patient on the above date.  I discussed the case with Ms. Sloan and I agree with findings and plan as detailed per note above, which I have amended where appropriate.
I have personally seen and examined patient on the above date.  I discussed the case with Ms. Sloan and I agree with findings and plan as detailed per note above, which I have amended where appropriate.
I have personally seen and examined patient on the above date.  I discussed the case with EVELYN Lin and I agree with findings and plan as detailed per note above, which I have amended where appropriate.      GI bleed   - resolved s/p EGD    Aspiration pneumonia  - completed antibiotics but still with spiking fevers and mild leukocytosis    Left knee effusion - rule out gout flare?  - rheumatology following and tapped knee
Patient seen and examined.  Agree with assessment and plan as above.    Patient doing well post-procedure.  No abdominal pain, nausea or vomiting.  No further melena or BRBPR.  VSS.  Abdomen soft, nontender    Continue PPI for now  May advance diet as tolerated
I have personally seen and examined patient on the above date.  I discussed the case with EVELYN Lin and I agree with findings and plan as detailed per note above, which I have amended where appropriate.      Metabolic encephalopathy secondary to polypharmacy?  - patient was taking multiple narcotics   - neurology following  - ct head negative    Gout versus pseudogout in left ankle  - swollen, tender  - history of gout in past  - previous uric acid 2 weeks ago negative

## 2019-08-20 LAB
CULTURE RESULTS: SIGNIFICANT CHANGE UP
SPECIMEN SOURCE: SIGNIFICANT CHANGE UP

## 2020-03-04 NOTE — PROGRESS NOTE ADULT - SUBJECTIVE AND OBJECTIVE BOX
FOLLOW-UP VISIT:  Atrial fibrillation with a rapid ventricular response as well as an exacerbation of acute on chronic diastolic dysfunction    Consulting physician: Jamison Pitt DO, FACGASPER, TRAVIS, AMELIE      HISTORY OF PRESENT ILLNESS:    Will is a 89 year old male who we are following for atrial fibrillation with intermittent rapid ventricular response as well as an exacerbation of acute on chronic diastolic dysfunction.  He is also currently being treated for pneumonia.  He has not tolerated atrial fibrillation physically well in the past.  He continues to have atrial fibrillation with intermittent rapid ventricular response.  He has shown brief episodes of underlying sinus rhythm.  He is short of breath with mild activity and extended conversation. He denies any chest tightness, pain, or pressure.  He has no peripheral edema.  He is hemodynamically stable.    Social History     Socioeconomic History   • Marital status:      Spouse name: Not on file   • Number of children: Not on file   • Years of education: Not on file   • Highest education level: Not on file   Occupational History   • Not on file   Social Needs   • Financial resource strain: Not on file   • Food insecurity:     Worry: Not on file     Inability: Not on file   • Transportation needs:     Medical: Not on file     Non-medical: Not on file   Tobacco Use   • Smoking status: Former Smoker     Packs/day: 1.00     Years: 15.00     Pack years: 15.00     Types: Cigarettes     Last attempt to quit: 1955     Years since quittin.0   • Smokeless tobacco: Never Used   Substance and Sexual Activity   • Alcohol use: No     Frequency: Never     Drinks per session: 1 or 2     Binge frequency: Never   • Drug use: No   • Sexual activity: Not on file   Lifestyle   • Physical activity:     Days per week: Not on file     Minutes per session: Not on file   • Stress: Not on file   Relationships   • Social connections:     Talks on phone: Not on file      Gets together: Not on file     Attends Rastafarian service: Not on file     Active member of club or organization: Not on file     Attends meetings of clubs or organizations: Not on file     Relationship status: Not on file   • Intimate partner violence:     Fear of current or ex partner: Not on file     Emotionally abused: Not on file     Physically abused: Not on file     Forced sexual activity: Not on file   Other Topics Concern   • Not on file   Social History Narrative   • Not on file       Family History   Problem Relation Age of Onset   • Tuberculosis Father    • NEGATIVE FAMILY HX OF Mother    • Cancer Brother         melanoma   • Cancer Sister         breast    • NEGATIVE FAMILY HX OF Daughter    • NEGATIVE FAMILY HX OF Son    • Heart disease Sister    • Early death Brother 17        MVA   • Cancer Brother 67        leukemia   • COPD Brother    • Substance abuse Brother         heavy smoker   • NEGATIVE FAMILY HX OF Son    • Coronary Artery Disease Other         sibling   • Cancer Other         sibling       Past Medical History:   Diagnosis Date   • Anemia     iron therapy    • Anxiety    • Atrial fibrillation (CMS/HCC)    • Bilateral cataracts     s/p right extraction with IOL    • BPH (benign prostatic hypertrophy)    • Constipation    • Gastric bleed     bleeding ulcer    • Gastroesophageal reflux disease    • HCAP (healthcare-associated pneumonia) 3/1/2020   • HTN (hypertension)    • Hyperlipidemia     type 4   • Insomnia    • Osteoarthritis     multiple locations hand and spine especilally   • PSVT (paroxysmal supraventricular tachycardia) (CMS/HCC)    • Sinusitis, chronic    • Tachycardia-bradycardia syndrome (CMS/HCC)    • Thyroid condition        Current Facility-Administered Medications   Medication Dose Route Frequency Provider Last Rate Last Dose   • doxycycline hyclate (VIBRAMYCIN) capsule 100 mg  100 mg Oral 2 times per day Johnson Patricio DO       • vancomycin (VANCOCIN) 750 mg in sodium  Patient is a 78y old  Male who presents with a chief complaint of Worsening agitation and difficulty ambulating (03 Aug 2019 14:31)    HPI:  79 yo M with PMHx of Alzheimer Dementia, gout, HTN brought in by wife from home for declining mental status and difficulty ambulating x 1 week. History obtained from pt's wife at bedside. Pt has known dementia but over the past week, his wife has noticed that he had difficulty ambulating with swelling of both feet, decreased PO intake and worsening agitation.Pt was seen by his PCP, Dr. Adame who started treatment for gout and sent Rx for steroids but his wife has not picked up the prescription.  He has been taking Alprazolam for many years, but recently also prescribed Zolpidem at bedtime as needed for increased agitation. His wife also notices decrease in urine output, denies recent illness, falls, LOC, fevers, chills, nausea, vomiting, diarrhea (2019 16:55)    Interval Events:  Patient seen and examined at bedside.    MEDICATIONS:  MEDICATIONS  (STANDING):  amLODIPine   Tablet 5 milliGRAM(s) Oral daily  dextrose 5%. 1000 milliLiter(s) (50 mL/Hr) IV Continuous <Continuous>  dextrose 50% Injectable 12.5 Gram(s) IV Push once  dextrose 50% Injectable 25 Gram(s) IV Push once  dextrose 50% Injectable 25 Gram(s) IV Push once  insulin lispro (HumaLOG) corrective regimen sliding scale   SubCutaneous Before meals and at bedtime  latanoprost 0.005% Ophthalmic Solution 1 Drop(s) Both EYES at bedtime  lisinopril 40 milliGRAM(s) Oral daily  pantoprazole    Tablet 40 milliGRAM(s) Oral two times a day  polyethylene glycol 3350 17 Gram(s) Oral daily  sodium chloride 0.9%. 1000 milliLiter(s) (100 mL/Hr) IV Continuous <Continuous>  tamsulosin 0.8 milliGRAM(s) Oral at bedtime    MEDICATIONS  (PRN):  acetaminophen   Tablet .. 650 milliGRAM(s) Oral every 6 hours PRN Mild Pain (1 - 3)  acetaminophen   Tablet .. 650 milliGRAM(s) Oral every 6 hours PRN Temp greater or equal to 38C (100.4F)  ondansetron Injectable 4 milliGRAM(s) IV Push every 6 hours PRN Nausea and/or Vomiting  sodium chloride 0.9% lock flush 10 milliLiter(s) IV Push every 1 hour PRN Pre/post blood products, medications, blood draw, and to maintain line patency      Allergies    No Known Allergies    Intolerances        T(C): 37.7 (19 @ 05:00), Max: 38.2 (19 @ 20:02)  T(F): 99.9 (19 @ 05:00), Max: 100.8 (19 @ 20:02)  HR: 94 (19 05:00) (94 - 115)  BP: 145/72 (19 @ 05:00) (145/72 - 179/91)  RR: 16 (19 05:00) (14 - 16)  SpO2: 94% (19 @ 05:00) (93% - 96%)      Weight (kg): 72.6 (19 @ 05:00)      19 @ 07:01  -  19 @ 07:00  --------------------------------------------------------  IN:  Total IN: 0 mL    OUT:    Indwelling Catheter - Urethral: 1500 mL  Total OUT: 1500 mL    Total NET: -1500 mL      19 @ 07:01  -  19 @ 07:00  --------------------------------------------------------  IN:  Total IN: 0 mL    OUT:    Indwelling Catheter - Urethral: 3700 mL  Total OUT: 3700 mL    Total NET: -3700 mL          LABS:      CBC Full  -  ( 04 Aug 2019 05:45 )  WBC Count : 11.74 K/uL  RBC Count : 3.83 M/uL  Hemoglobin : 10.9 g/dL  Hematocrit : 33.6 %  Platelet Count - Automated : 324 K/uL  Mean Cell Volume : 87.7 fl  Mean Cell Hemoglobin : 28.5 pg  Mean Cell Hemoglobin Concentration : 32.4 gm/dL  Auto Neutrophil # : x  Auto Lymphocyte # : x  Auto Monocyte # : x  Auto Eosinophil # : x  Auto Basophil # : x  Auto Neutrophil % : x  Auto Lymphocyte % : x  Auto Monocyte % : x  Auto Eosinophil % : x  Auto Basophil % : x    08-04    140  |  105  |  12  ----------------------------<  125<H>  3.6   |  25  |  1.15    Ca    8.7      04 Aug 2019 05:45          Urinalysis Basic - ( 02 Aug 2019 18:31 )    Color: Yellow / Appearance: Clear / S.015 / pH: x  Gluc: x / Ketone: Negative  / Bili: Negative / Urobili: Negative   Blood: x / Protein: 100 / Nitrite: Negative   Leuk Esterase: Moderate / RBC: >50 /HPF / WBC 11-25 /HPF   Sq Epi: x / Non Sq Epi: Neg.-Few / Bacteria: Few /HPF              Physical Exam    Constitutional: alert, no acute distress    Abdomen: soft, nontender, nondistended, no HSM    Genitourinary: no bladder distention, redmond yellow chloride 0.9 % 250 mL IVPB  750 mg Intravenous 2 times per day Johnson Patricio .7 mL/hr at 03/04/20 1003 750 mg at 03/04/20 1003   • AMIODarone (CORDARONE) 450 mg/250 mL dextrose 5% infusion  1 mg/min Intravenous Continuous Gillian Brizuela, NP 33.3 mL/hr at 03/04/20 0604 1 mg/min at 03/04/20 0604   • AMIODarone (PACERONE) tablet 200 mg  200 mg Oral 2 times per day Gillian Brizuela NP   200 mg at 03/04/20 0928   • sodium chloride 0.9% infusion   Intravenous Continuous PRN Johnson Patricio DO       • sodium chloride 0.9 % flush bag 25 mL  25 mL Intravenous PRN Johnson Patricio DO 10 mL/hr at 03/04/20 0914     • sodium chloride (PF) 0.9 % injection 2 mL  2 mL Intracatheter 2 times per day Johnson Patricio DO   2 mL at 03/04/20 0922   • melatonin tablet 3 mg  3 mg Oral Nightly PRN Johnson Patricio DO       • hydrALAZINE (APRESOLINE) tablet 25 mg  25 mg Oral Q4H PRN Johnson Patricio DO   25 mg at 03/02/20 2008   • ipratropium-albuterol (DUONEB) 0.5-2.5 (3) MG/3ML nebulizer solution 3 mL  3 mL Nebulization Q6H Resp PRN Johnson Patricio DO       • sodium chloride (NORMAL SALINE) 0.9 % bolus 500 mL  500 mL Intravenous PRN Johnson Patricio DO       • nitroGLYcerin (NITROSTAT) sublingual tablet 0.4 mg  0.4 mg Sublingual Q5 Min PRN Johnson Patricio DO       • guaiFENesin (MUCINEX) ER tablet 1,200 mg  1,200 mg Oral 2 times per day Johnson Patricio DO   1,200 mg at 03/04/20 0927   • potassium CHLORIDE (KLOR-CON M) shan ER tablet 20 mEq  20 mEq Oral Q4H PRN Johnson Patricio DO       • potassium CHLORIDE (KLOR-CON) packet 20 mEq  20 mEq Per NG tube Q4H PRN Johnson Patricio DO       • potassium CHLORIDE 20 mEq/100mL IVPB premix  20 mEq Intravenous Q4H PRN Johnson Patricio, DO       • potassium CHLORIDE (KLOR-CON M) shan ER tablet 40 mEq  40 mEq Oral Q4H PRN Johnson Patricio, DO       • potassium CHLORIDE (KLOR-CON) packet 40 mEq  40 mEq Per NG tube Q4H PRN Johnson Patricio, DO       • potassium CHLORIDE 20 mEq/100mL IVPB premix  40  mEq Intravenous Q4H PRN Johnson Patricio, DO       • magnesium sulfate 1 g in dextrose 5% 100 mL IVPB premix  1 g Intravenous Daily PRN Johnson Patricio DO   Stopped at 03/01/20 1354   • magnesium sulfate 2 g in 50 mL premix IVPB  2 g Intravenous Daily PRN Johnson Patricio DO       • magnesium sulfate 2 g in 50 mL premix IVPB  2 g Intravenous Q4H PRN oJhnson Patricio DO       • ondansetron (ZOFRAN) injection 4 mg  4 mg Intravenous BID PRN Johnson Patricio DO       • acetaminophen (TYLENOL) tablet 650 mg  650 mg Oral Q4H PRN Johnson Patricio DO   650 mg at 03/01/20 1257   • docusate sodium-sennosides (SENOKOT S) 50-8.6 MG 2 tablet  2 tablet Oral Daily PRN Johnson Patricio DO       • sodium chloride 0.9 % flush bag 500 mL  500 mL Intravenous PRN Johnson Patricio DO   Stopped at 03/03/20 1523   • VANCOMYCIN - PHARMACIST MONITORED   Does not apply See Admin Instructions Johnson Patricio DO       • cefepime (MAXIPIME) 2,000 mg in sodium chloride 0.9 % 100 mL IVPB  2,000 mg Intravenous 2 times per day Johnson Patricio DO   Stopped at 03/04/20 1300   • atorvastatin (LIPITOR) tablet 20 mg  20 mg Oral QHS Johnson Patricio DO   20 mg at 03/03/20 2107   • citalopram (CeleXA) tablet 20 mg  20 mg Oral Daily Johnson Patricio, DO   20 mg at 03/04/20 0927   • levothyroxine (SYNTHROID, LEVOTHROID) tablet 44 mcg  44 mcg Oral QAM  Johnson Patricio DO   44 mcg at 03/04/20 0605   • metoPROLOL succinate (TOPROL-XL) ER tablet 50 mg  50 mg Oral 2 times per day Johnson Patricio DO   50 mg at 03/04/20 0927   • pantoprazole (PROTONIX) EC tablet 40 mg  40 mg Oral BID  Johnson Patricio, DO   40 mg at 03/04/20 0605   • sucralfate (CARAFATE) 1 GM/10ML suspension 1 g  1 g Oral TID  Johnson Patricio, DO   1 g at 03/04/20 0605       REVIEW OF SYSTEMS: All systems are reviewed and are negative except as noted above in HPI.  CONSTITUTIONAL:  Denies fever, chills, or myalgias.  He is generally weak  RESPIRATORY:  He does have a cough of dark yellow  sputum production.  He is short of breath with mild exertion.  CARDIOVASCULAR:  Denies chest pain,  palpitations, or presyncope.  GASTROINTESTINAL:  Denies abdominal pain, nausea, vomiting, bloody stools, constipation, or diarrhea.   GENITOURINARY:  Denies dysuria or flank pain.   MUSCULOSKELETAL:  Denies back pain.  INTEGUMENT:  Denies rash.     OBJECTIVE:  VITAL SIGNS:    Visit Vitals  /70   Pulse (!) 112   Temp 99.3 °F (37.4 °C) (Temporal)   Resp 18   Ht 5' 7\" (1.702 m)   Wt 76 kg   SpO2 94%   BMI 26.24 kg/m²     GENERAL:  Will Glez is a 89 year old male who is well developed and well nourished.  He is in no acute distress, non-toxic appearance.   HEENT:  Atraumatic, external ears normal, nose normal, oropharynx moist, Eyes: PERRL (Pupils equal, round, reactive to light), conjunctivae normal.   RESPIRATORY  No respiratory distress, decreased breath sounds, bibasilar rales, no wheezing.  Oxygen at 2 L per nasal cannula.  He is  short of breath with mild exertion  CARDIOVASCULAR:  Apical irregular and tachycardic, no murmurs, no gallops, no rubs.   GASTROINTESTINAL:  Soft, nondistended, normal bowel sounds. Nontender, no organomegaly, no mass, no rebound, no guarding.   GENITOURINARY:  No costovertebral angle tenderness.   MUSCULOSKELETAL:  Back - no tenderness.  SKIN:  Well hydrated, no rash.   LYMPHATICS:  No lymphadenopathy noted.   EXTREMITIES:  No clubbing, cyanosis or edema. Pulses are adequate in all extremities.  NEUROLOGICAL:  Alert & oriented x 3, CN (cranial nerves) 2-12 normal, normal motor function, normal sensory function, no focal deficits noted.   PSYCHIATRIC:  Speech and behavior appropriate.     Laboratory and cardiac imaging:  Recent Labs   Lab 03/04/20  0750 03/03/20  0425 03/02/20  0443 03/01/20  0708 03/01/20  0707   SODIUM 137 141 142  --  144   POTASSIUM 3.7 4.1 4.2  --  4.3   CHLORIDE 104 107 108*  --  105   CO2 23 24 25  --  26   BUN 25* 25* 30*  --  36*   CREATININE 0.89 0.85  0.93  --  1.21*   GLUCOSE 126* 113* 98  --  119*   ALBUMIN  --   --   --   --  3.6   AST  --   --   --   --  15   BILIRUBIN  --   --   --   --  0.2   INR  --   --   --  1.0  --    RAPDTR  --   --   --   --  0.03     Recent Labs   Lab 03/04/20  0739 03/03/20  2157 03/03/20  1413 03/03/20  0425 03/02/20  1611   WBC 11.9*  --   --  11.8* 12.1*   HGB 8.0* 8.1* 8.3* 7.7* 7.9*   HCT 26.9* 27.7* 29.4* 26.1* 26.8*     --   --  221 206         ASSESSMENT:   1. HCAP (healthcare-associated pneumonia)    2. Elevated lactic acid level    3. Acute on chronic blood loss anemia    4. Paroxysmal atrial fibrillation (CMS/[facility])    5. Cardiac pacemaker in situ    6. Pulmonary hypertension (CMS/[facility])        CHF Best Practice Guidelines:      LVEF assessment: yes - EF% and date attached  Ejection Fraction   Date Value Ref Range Status   03/03/2020 60.0 % Final            LVEF < or equal to 35%: no        Recommendations:    1. Paroxysmal atrial fibrillation-patient has a history of paroxysmal atrial fibrillation on metoprolol XL 50 mg twice a day.  He currently is admitted for left mid and lower lobe pneumonia as well as exacerbation of acute on chronic diastolic dysfunction.  He is currently in atrial fibrillation with a heart rate of 102.  He has had documented sinus rhythm this admission.  He is not on anticoagulation or aspirin because of GI bleeding the end of January 2020 as well as anemia upon admission with an H&H of 6.5 in 22.7.  He also has a high falls risk.  He does not tolerate the atrial fibrillation well at all.  He remains in atrial fibrillation with intermittent rapid ventricular response of a heart rate of 113. We will administer digoxin 0.5 mg IV now.  We will continue amiodarone 200 mg twice a day.  We will continue the amiodarone 1 milligram/minute IV.  We will continue Toprol 50 mg twice a day.        2. Left lower lobe pneumonia-continue aggressive respiratory treatment with Mucinex as well as IV antibiotic  therapy.     3. Hypertension-blood pressure adequately controlled at 137/67.  No change in medication or therapy.     4. Hyperlipidemia-cholesterol adequately controlled.  Continue Lipitor 20 mg a day.     5. A exacerbation of acute on chronic diastolic heart failure.-patient will receiveanother Lasix 20 mg IV x1 this morning.     Thank you for letting us take part in this patient's care.  We will continue to follow.     Gillian Brizuela NP    The patient was seen and examined around this morning.  Overnight he clinically has done well, and this morning is requiring less supplemental oxygen.  Unfortunately he remains in atrial fibrillation despite the addition of IV amiodarone.  This morning he appears hypervolemic but no longer in acute decompensated congestive heart failure.  In addition to the above recommendations, will continue with IV amiodarone 1 milligram/minute, transition over to oral amiodarone, and continue with his metoprolol as well.  Unfortunately because of his severe anemia and prior hemorrhage anticoagulation is not an option.  In addition to the above recommendations, continue on telemetry, will re-evaluate in the morning.    Jamison Pitt DO, ROHITH, AMELIE ROBLES DO, ROHITH, TRAVIS, AMELIE

## 2021-08-16 NOTE — PROGRESS NOTE ADULT - SUBJECTIVE AND OBJECTIVE BOX
Follow-up Critical Care Progress Note  Chief Complaint : Altered mental status      pt comfortable but anxious  no bleeding  d/w dr. Arevalo noted overnight pt required [place] for urinary retension   d/w wife today states pt has had BPH/prostate issues in past was told to pt needs a procedure to open the prostate so he can urinate again      Allergies :No Known Allergies      PAST MEDICAL & SURGICAL HISTORY:  Gout  Hypertension  Dementia  H/O inguinal hernia repair      Medications:  MEDICATIONS  (STANDING):  amLODIPine   Tablet 5 milliGRAM(s) Oral daily  dextrose 5%. 1000 milliLiter(s) (50 mL/Hr) IV Continuous <Continuous>  dextrose 50% Injectable 12.5 Gram(s) IV Push once  dextrose 50% Injectable 25 Gram(s) IV Push once  dextrose 50% Injectable 25 Gram(s) IV Push once  insulin lispro (HumaLOG) corrective regimen sliding scale   SubCutaneous Before meals and at bedtime  latanoprost 0.005% Ophthalmic Solution 1 Drop(s) Both EYES at bedtime  lisinopril 40 milliGRAM(s) Oral daily  pantoprazole    Tablet 40 milliGRAM(s) Oral two times a day  tamsulosin 0.4 milliGRAM(s) Oral at bedtime    MEDICATIONS  (PRN):  acetaminophen   Tablet .. 650 milliGRAM(s) Oral every 6 hours PRN Mild Pain (1 - 3)  ALPRAZolam 0.5 milliGRAM(s) Oral three times a day PRN agitation/anxiety  ondansetron Injectable 4 milliGRAM(s) IV Push every 6 hours PRN Nausea and/or Vomiting  sodium chloride 0.9% lock flush 10 milliLiter(s) IV Push every 1 hour PRN Pre/post blood products, medications, blood draw, and to maintain line patency  traZODone 100 milliGRAM(s) Oral at bedtime PRN sleep/agitation      LABS:                        10.8   10.22 )-----------( 228      ( 02 Aug 2019 08:57 )             33.1     08-02    139  |  105  |  13  ----------------------------<  137<H>  3.8   |  23  |  1.14    Ca    8.3<L>      02 Aug 2019 08:57  Mg     1.8     08-01    CULTURES: (if applicable)    Culture - Blood (collected 07-27-19 @ 12:13)  Source: .Blood Blood  Final Report (08-01-19 @ 20:00):    No growth at 5 days.    Culture - Blood (collected 07-27-19 @ 12:13)  Source: .Blood Blood  Gram Stain (07-28-19 @ 08:04):    Growth in aerobic bottle: Gram Positive Cocci in Pairs and Chains  Final Report (07-29-19 @ 13:16):    Growth in aerobic bottle: Alpha hemolytic strep    (not Strep. pneumoniae or Enterococcus)    Single set isolate, possible contaminant. Contact    Microbiology if susceptibility testing clinically    indicated. Multiple Morphological Strains    "Due to technical problems, Proteus sp. will Not be reported as part of    the BCID panel until further notice"    ***Blood Panel PCR results on this specimen are available    approximately 3 hours after the Gram stain result.***    Gram stain, PCR, and/or culture resultsmay not always    correspond due to difference in methodologies.    ************************************************************    This PCR assay was performed using [employer].    The following targets are tested for: Enterococcus,    vancomycin resistant enterococci, Listeria monocytogenes,    coagulase negative staphylococci, S. aureus,    methicillin resistant S. aureus, Streptococcus agalactiae    (Group B), S. pneumoniae, S. pyogenes (Group A),    Acinetobacter baumannii, Enterobacter cloacae, E. coli,    Klebsiella oxytoca, K. pneumoniae, Proteus sp.,    Serratia marcescens, Haemophilus influenzae,    Neisseria meningitidis, Pseudomonas aeruginosa, Candida    albicans, C. glabrata, C krusei, C parapsilosis,    C. tropicalis and the KPC resistance gene.  Organism: Blood Culture PCR (07-29-19 @ 13:16)  Organism: Blood Culture PCR (07-29-19 @ 13:16)      -  Streptococcus sp. (Not Grp A, B or S pneumoniae): Detec      Method Type: PCR    Culture - Urine (collected 07-27-19 @ 12:13)  Source: .Urine Clean Catch (Midstream)  Final Report (07-28-19 @ 12:52):    No growth    Culture - Sputum (collected 07-27-19 @ 12:05)  Source: .Sputum Sputum  Gram Stain (07-27-19 @ 22:50):    Moderate polymorphonuclear leukocytes per low power field    Few Squamous epithelial cells per low power field    Moderate Yeast like cells per oil power field    Moderate Gram Positive Cocci in Pairs and Chains per oil power field  Final Report (07-29-19 @ 16:56):    Normal Respiratory Jenny present    Culture - Urine (collected 07-25-19 @ 12:30)  Source: .Urine Clean Catch (Midstream)  Final Report (07-26-19 @ 12:45):    No growth    Culture - Blood (collected 07-25-19 @ 11:20)  Source: .Blood Blood-Peripheral  Final Report (07-30-19 @ 16:01):    No growth at 5 days.    Culture - Blood (collected 07-25-19 @ 11:20)  Source: .Blood Blood-Peripheral  Final Report (07-30-19 @ 16:01):    No growth at 5 days.              VITALS:  T(C): 36.7 (08-02-19 @ 05:23), Max: 36.7 (08-01-19 @ 15:31)  T(F): 98 (08-02-19 @ 05:23), Max: 98.1 (08-01-19 @ 15:31)  HR: 94 (08-02-19 @ 05:23) (92 - 104)  BP: 156/79 (08-02-19 @ 05:23) (149/77 - 156/79)  BP(mean): --  ABP: --  ABP(mean): --  RR: 15 (08-02-19 @ 05:23) (15 - 15)  SpO2: 96% (08-02-19 @ 05:23) (95% - 96%)  CVP(mm Hg): --  CVP(cm H2O): --    Ins and Outs     08-01-19 @ 07:01  -  08-02-19 @ 07:00  --------------------------------------------------------  IN: 1385 mL / OUT: 1200 mL / NET: 185 mL    08-02-19 @ 07:01 - 08-02-19 @ 11:24  --------------------------------------------------------  IN: 400 mL / OUT: 0 mL / NET: 400 mL        Height (cm): 160 (07-31-19 @ 17:51)  Weight (kg): 96.8 (07-31-19 @ 17:51)  BMI (kg/m2): 37.8 (07-31-19 @ 17:51)        I&O's Detail    01 Aug 2019 07:01  -  02 Aug 2019 07:00  --------------------------------------------------------  IN:    lactated ringers.: 375 mL    Oral Fluid: 660 mL    Solution: 50 mL    Solution: 300 mL  Total IN: 1385 mL    OUT:    Indwelling Catheter - Urethral: 1200 mL  Total OUT: 1200 mL    Total NET: 185 mL      02 Aug 2019 07:01  -  02 Aug 2019 11:24  --------------------------------------------------------  IN:    Oral Fluid: 400 mL  Total IN: 400 mL    OUT:  Total OUT: 0 mL    Total NET: 400 mL Yes...

## 2022-08-27 NOTE — CHART NOTE - NSCHARTNOTEFT_GEN_A_CORE
12 lead EKG completed. Results handed to [employer].  Anamaria Jefferys CET Bladder scan indicating >1000cc of urine in bladder, Marrero placed by nursing staff immediately productive of >700cc clear yellow urine.  Marrero to remain in place overnight for urinary retention. Bladder scan indicating >1000cc of urine in bladder, Marrero placed by nursing staff immediately productive of >1500cc pink tinged urine.  Marrero to remain in place overnight for urinary retention.

## 2023-03-31 NOTE — ED ADULT NURSE NOTE - NSSUHOSCREENINGYN_ED_ALL_ED
IMPROVE-DD Application Not Available No - the patient is unable to be screened due to medical condition

## 2025-03-06 NOTE — PATIENT PROFILE ADULT - NSASFALLNEEDSASSISTWITH_GEN_A_NUR
Pt with reddened sclera, drainage, dried drainage to left eye lids x 2 days.  Allergies and pharmacy verified.  Sent message to Dr. Raysa Lu protocol followed for eye red with discharge.  All protocol questions negative.  Home care advise given per protocol. Sent to doctor for Rx. Call back prn if no improvement or any worsening symptoms. Parent/guardian understands and will comply.    walking/standing/toileting